# Patient Record
Sex: MALE | Race: WHITE | Employment: OTHER | URBAN - METROPOLITAN AREA
[De-identification: names, ages, dates, MRNs, and addresses within clinical notes are randomized per-mention and may not be internally consistent; named-entity substitution may affect disease eponyms.]

---

## 2017-01-11 ENCOUNTER — TELEPHONE (OUTPATIENT)
Dept: RHEUMATOLOGY | Age: 62
End: 2017-01-11

## 2017-01-24 DIAGNOSIS — M1A.9XX0 CHRONIC GOUT WITHOUT TOPHUS, UNSPECIFIED CAUSE, UNSPECIFIED SITE: Primary | ICD-10-CM

## 2017-01-24 RX ORDER — COLCHICINE 0.6 MG/1
0.6 CAPSULE ORAL
Qty: 15 CAPSULE | Refills: 2 | Status: SHIPPED | OUTPATIENT
Start: 2017-01-24 | End: 2017-06-22 | Stop reason: SDUPTHER

## 2017-01-30 ENCOUNTER — TELEPHONE (OUTPATIENT)
Dept: RHEUMATOLOGY | Age: 62
End: 2017-01-30

## 2017-03-09 ENCOUNTER — OFFICE VISIT (OUTPATIENT)
Dept: ENDOCRINOLOGY | Age: 62
End: 2017-03-09

## 2017-03-09 VITALS
HEIGHT: 70 IN | HEART RATE: 80 BPM | SYSTOLIC BLOOD PRESSURE: 136 MMHG | BODY MASS INDEX: 24.91 KG/M2 | DIASTOLIC BLOOD PRESSURE: 76 MMHG | WEIGHT: 174 LBS | OXYGEN SATURATION: 98 %

## 2017-03-09 DIAGNOSIS — E11.21 TYPE 2 DIABETES MELLITUS WITH DIABETIC NEPHROPATHY, WITHOUT LONG-TERM CURRENT USE OF INSULIN (HCC): Primary | ICD-10-CM

## 2017-03-09 LAB — HBA1C MFR BLD: 5.9 %

## 2017-03-09 PROCEDURE — 99213 OFFICE O/P EST LOW 20 MIN: CPT | Performed by: INTERNAL MEDICINE

## 2017-03-09 ASSESSMENT — PATIENT HEALTH QUESTIONNAIRE - PHQ9
SUM OF ALL RESPONSES TO PHQ QUESTIONS 1-9: 0
1. LITTLE INTEREST OR PLEASURE IN DOING THINGS: 0
SUM OF ALL RESPONSES TO PHQ9 QUESTIONS 1 & 2: 0
2. FEELING DOWN, DEPRESSED OR HOPELESS: 0

## 2017-03-13 ENCOUNTER — TELEPHONE (OUTPATIENT)
Dept: RHEUMATOLOGY | Age: 62
End: 2017-03-13

## 2017-03-13 RX ORDER — GLUCOSAM/CHON-MSM1/C/MANG/BOSW 500-416.6
TABLET ORAL
Qty: 100 EACH | Refills: 2 | Status: SHIPPED | OUTPATIENT
Start: 2017-03-13 | End: 2017-07-13 | Stop reason: SDUPTHER

## 2017-03-13 RX ORDER — CALCIUM CITRATE/VITAMIN D3 200MG-6.25
TABLET ORAL
Qty: 100 EACH | Refills: 3 | Status: SHIPPED | OUTPATIENT
Start: 2017-03-13 | End: 2017-07-13 | Stop reason: SDUPTHER

## 2017-03-15 ENCOUNTER — TELEPHONE (OUTPATIENT)
Dept: RHEUMATOLOGY | Age: 62
End: 2017-03-15

## 2017-03-29 ENCOUNTER — OFFICE VISIT (OUTPATIENT)
Dept: FAMILY MEDICINE CLINIC | Age: 62
End: 2017-03-29

## 2017-03-29 VITALS
SYSTOLIC BLOOD PRESSURE: 136 MMHG | BODY MASS INDEX: 25.65 KG/M2 | HEIGHT: 70 IN | DIASTOLIC BLOOD PRESSURE: 74 MMHG | WEIGHT: 179.2 LBS

## 2017-03-29 DIAGNOSIS — F41.0 PANIC DISORDER: ICD-10-CM

## 2017-03-29 DIAGNOSIS — K21.9 GASTROESOPHAGEAL REFLUX DISEASE WITHOUT ESOPHAGITIS: ICD-10-CM

## 2017-03-29 DIAGNOSIS — F40.00 AGORAPHOBIA: ICD-10-CM

## 2017-03-29 DIAGNOSIS — E11.21 TYPE 2 DIABETES MELLITUS WITH DIABETIC NEPHROPATHY, WITHOUT LONG-TERM CURRENT USE OF INSULIN (HCC): ICD-10-CM

## 2017-03-29 DIAGNOSIS — I10 ESSENTIAL HYPERTENSION: ICD-10-CM

## 2017-03-29 DIAGNOSIS — E78.2 MIXED HYPERLIPIDEMIA: ICD-10-CM

## 2017-03-29 DIAGNOSIS — F41.9 ANXIETY: Primary | ICD-10-CM

## 2017-03-29 PROCEDURE — 99214 OFFICE O/P EST MOD 30 MIN: CPT | Performed by: FAMILY MEDICINE

## 2017-03-29 RX ORDER — FAMOTIDINE 20 MG/1
20 TABLET, FILM COATED ORAL NIGHTLY PRN
Qty: 30 TABLET | Refills: 2 | Status: SHIPPED | OUTPATIENT
Start: 2017-03-29 | End: 2017-06-22 | Stop reason: SDUPTHER

## 2017-03-29 RX ORDER — SIMVASTATIN 20 MG
20 TABLET ORAL EVERY EVENING
Qty: 30 TABLET | Refills: 2 | Status: SHIPPED | OUTPATIENT
Start: 2017-03-29 | End: 2017-06-22 | Stop reason: SDUPTHER

## 2017-03-29 RX ORDER — ALPRAZOLAM 1 MG/1
TABLET ORAL
Qty: 100 TABLET | Refills: 2 | Status: SHIPPED | OUTPATIENT
Start: 2017-03-29 | End: 2017-06-22 | Stop reason: SDUPTHER

## 2017-03-30 ASSESSMENT — ENCOUNTER SYMPTOMS
WHEEZING: 0
BLOOD IN STOOL: 0
DIARRHEA: 0
CONSTIPATION: 0
TROUBLE SWALLOWING: 0
CHEST TIGHTNESS: 0
ABDOMINAL PAIN: 0
SORE THROAT: 0
SHORTNESS OF BREATH: 0

## 2017-04-05 ENCOUNTER — HOSPITAL ENCOUNTER (OUTPATIENT)
Dept: OTHER | Age: 62
Discharge: HOME | End: 2017-04-05
Attending: FAMILY MEDICINE | Admitting: FAMILY MEDICINE

## 2017-04-05 ENCOUNTER — OFFICE VISIT (OUTPATIENT)
Dept: RHEUMATOLOGY | Age: 62
End: 2017-04-05

## 2017-04-05 VITALS
TEMPERATURE: 98.7 F | BODY MASS INDEX: 25.34 KG/M2 | HEIGHT: 70 IN | WEIGHT: 177 LBS | DIASTOLIC BLOOD PRESSURE: 76 MMHG | SYSTOLIC BLOOD PRESSURE: 134 MMHG

## 2017-04-05 DIAGNOSIS — M25.551 HIP PAIN, BILATERAL: ICD-10-CM

## 2017-04-05 DIAGNOSIS — M25.552 HIP PAIN, BILATERAL: ICD-10-CM

## 2017-04-05 DIAGNOSIS — M1A.9XX0 CHRONIC GOUT WITHOUT TOPHUS, UNSPECIFIED CAUSE, UNSPECIFIED SITE: Primary | ICD-10-CM

## 2017-04-05 DIAGNOSIS — N18.3 CKD (CHRONIC KIDNEY DISEASE), STAGE 3 (MODERATE): ICD-10-CM

## 2017-04-05 PROCEDURE — 99215 OFFICE O/P EST HI 40 MIN: CPT | Performed by: INTERNAL MEDICINE

## 2017-04-05 RX ORDER — FEBUXOSTAT 40 MG/1
TABLET, FILM COATED ORAL
Qty: 30 TABLET | Refills: 5 | Status: SHIPPED | OUTPATIENT
Start: 2017-04-05 | End: 2017-10-05 | Stop reason: SDUPTHER

## 2017-04-05 RX ORDER — COLCHICINE 0.6 MG/1
CAPSULE ORAL
Qty: 15 CAPSULE | Refills: 4 | Status: SHIPPED | OUTPATIENT
Start: 2017-04-05 | End: 2017-12-14 | Stop reason: SDUPTHER

## 2017-04-07 ENCOUNTER — TELEPHONE (OUTPATIENT)
Dept: RHEUMATOLOGY | Age: 62
End: 2017-04-07

## 2017-06-22 ENCOUNTER — OFFICE VISIT (OUTPATIENT)
Dept: FAMILY MEDICINE CLINIC | Age: 62
End: 2017-06-22

## 2017-06-22 VITALS
BODY MASS INDEX: 26.23 KG/M2 | SYSTOLIC BLOOD PRESSURE: 134 MMHG | HEIGHT: 70 IN | WEIGHT: 183.2 LBS | DIASTOLIC BLOOD PRESSURE: 80 MMHG

## 2017-06-22 DIAGNOSIS — F41.9 ANXIETY: ICD-10-CM

## 2017-06-22 DIAGNOSIS — F41.0 PANIC DISORDER: ICD-10-CM

## 2017-06-22 DIAGNOSIS — E78.2 MIXED HYPERLIPIDEMIA: ICD-10-CM

## 2017-06-22 DIAGNOSIS — F40.00 AGORAPHOBIA: ICD-10-CM

## 2017-06-22 DIAGNOSIS — K21.9 GASTROESOPHAGEAL REFLUX DISEASE WITHOUT ESOPHAGITIS: ICD-10-CM

## 2017-06-22 DIAGNOSIS — I10 ESSENTIAL HYPERTENSION: ICD-10-CM

## 2017-06-22 DIAGNOSIS — E11.21 TYPE 2 DIABETES MELLITUS WITH DIABETIC NEPHROPATHY, WITHOUT LONG-TERM CURRENT USE OF INSULIN (HCC): Primary | ICD-10-CM

## 2017-06-22 PROCEDURE — 99214 OFFICE O/P EST MOD 30 MIN: CPT | Performed by: FAMILY MEDICINE

## 2017-06-22 RX ORDER — FAMOTIDINE 20 MG/1
20 TABLET, FILM COATED ORAL NIGHTLY PRN
Qty: 30 TABLET | Refills: 2 | Status: SHIPPED | OUTPATIENT
Start: 2017-06-22 | End: 2017-09-21 | Stop reason: SDUPTHER

## 2017-06-22 RX ORDER — ALPRAZOLAM 1 MG/1
TABLET ORAL
Qty: 100 TABLET | Refills: 2 | Status: SHIPPED | OUTPATIENT
Start: 2017-06-22 | End: 2017-09-21 | Stop reason: SDUPTHER

## 2017-06-22 RX ORDER — SIMVASTATIN 20 MG
20 TABLET ORAL EVERY EVENING
Qty: 30 TABLET | Refills: 2 | Status: SHIPPED | OUTPATIENT
Start: 2017-06-22 | End: 2017-09-21 | Stop reason: SDUPTHER

## 2017-06-23 ASSESSMENT — ENCOUNTER SYMPTOMS
BACK PAIN: 1
COUGH: 0
ABDOMINAL PAIN: 0
SHORTNESS OF BREATH: 0

## 2017-07-13 ENCOUNTER — OFFICE VISIT (OUTPATIENT)
Dept: ENDOCRINOLOGY | Age: 62
End: 2017-07-13

## 2017-07-13 VITALS
HEART RATE: 78 BPM | DIASTOLIC BLOOD PRESSURE: 78 MMHG | WEIGHT: 185 LBS | RESPIRATION RATE: 16 BRPM | BODY MASS INDEX: 25.9 KG/M2 | SYSTOLIC BLOOD PRESSURE: 138 MMHG | HEIGHT: 71 IN

## 2017-07-13 DIAGNOSIS — E11.8 CONTROLLED TYPE 2 DIABETES MELLITUS WITH COMPLICATION, WITHOUT LONG-TERM CURRENT USE OF INSULIN (HCC): Primary | ICD-10-CM

## 2017-07-13 LAB — HBA1C MFR BLD: 6.5 %

## 2017-07-13 PROCEDURE — 83036 HEMOGLOBIN GLYCOSYLATED A1C: CPT | Performed by: INTERNAL MEDICINE

## 2017-07-13 PROCEDURE — 99213 OFFICE O/P EST LOW 20 MIN: CPT | Performed by: INTERNAL MEDICINE

## 2017-07-13 RX ORDER — GLUCOSAM/CHON-MSM1/C/MANG/BOSW 500-416.6
TABLET ORAL
Qty: 150 EACH | Refills: 3 | Status: SHIPPED | OUTPATIENT
Start: 2017-07-13 | End: 2017-12-14 | Stop reason: SDUPTHER

## 2017-07-13 RX ORDER — OXYCODONE HYDROCHLORIDE 5 MG/1
5 TABLET ORAL EVERY 4 HOURS PRN
COMMUNITY
End: 2021-07-09

## 2017-07-13 RX ORDER — CALCIUM CITRATE/VITAMIN D3 200MG-6.25
TABLET ORAL
Qty: 150 EACH | Refills: 3 | Status: SHIPPED | OUTPATIENT
Start: 2017-07-13 | End: 2017-12-14 | Stop reason: SDUPTHER

## 2017-08-10 ENCOUNTER — OFFICE VISIT (OUTPATIENT)
Dept: RHEUMATOLOGY | Age: 62
End: 2017-08-10

## 2017-08-10 VITALS
WEIGHT: 186 LBS | BODY MASS INDEX: 26.63 KG/M2 | DIASTOLIC BLOOD PRESSURE: 70 MMHG | HEIGHT: 70 IN | TEMPERATURE: 98.2 F | SYSTOLIC BLOOD PRESSURE: 110 MMHG

## 2017-08-10 DIAGNOSIS — M1A.9XX0 CHRONIC GOUT WITHOUT TOPHUS, UNSPECIFIED CAUSE, UNSPECIFIED SITE: Primary | ICD-10-CM

## 2017-08-10 DIAGNOSIS — M1A.9XX0 CHRONIC GOUT WITHOUT TOPHUS, UNSPECIFIED CAUSE, UNSPECIFIED SITE: ICD-10-CM

## 2017-08-10 DIAGNOSIS — M16.0 PRIMARY OSTEOARTHRITIS OF BOTH HIPS: ICD-10-CM

## 2017-08-10 LAB
A/G RATIO: 1.7 (ref 1.1–2.2)
ALBUMIN SERPL-MCNC: 4.4 G/DL (ref 3.4–5)
ALP BLD-CCNC: 58 U/L (ref 40–129)
ALT SERPL-CCNC: 27 U/L (ref 10–40)
ANION GAP SERPL CALCULATED.3IONS-SCNC: 12 MMOL/L (ref 3–16)
AST SERPL-CCNC: 25 U/L (ref 15–37)
BILIRUB SERPL-MCNC: 1 MG/DL (ref 0–1)
BILIRUBIN DIRECT: <0.2 MG/DL (ref 0–0.3)
BILIRUBIN, INDIRECT: NORMAL MG/DL (ref 0–1)
BUN BLDV-MCNC: 26 MG/DL (ref 7–20)
CALCIUM SERPL-MCNC: 9.3 MG/DL (ref 8.3–10.6)
CHLORIDE BLD-SCNC: 102 MMOL/L (ref 99–110)
CO2: 26 MMOL/L (ref 21–32)
CREAT SERPL-MCNC: 1.3 MG/DL (ref 0.8–1.3)
GFR AFRICAN AMERICAN: >60
GFR NON-AFRICAN AMERICAN: 56
GLOBULIN: 2.6 G/DL
GLUCOSE BLD-MCNC: 107 MG/DL (ref 70–99)
POTASSIUM SERPL-SCNC: 4.6 MMOL/L (ref 3.5–5.1)
SODIUM BLD-SCNC: 140 MMOL/L (ref 136–145)
TOTAL PROTEIN: 7 G/DL (ref 6.4–8.2)
URIC ACID, SERUM: 3.3 MG/DL (ref 3.5–7.2)

## 2017-08-10 PROCEDURE — 99214 OFFICE O/P EST MOD 30 MIN: CPT | Performed by: INTERNAL MEDICINE

## 2017-08-10 RX ORDER — FEBUXOSTAT 40 MG/1
40 TABLET, FILM COATED ORAL DAILY
Qty: 90 TABLET | Refills: 1 | Status: SHIPPED | OUTPATIENT
Start: 2017-08-10 | End: 2017-12-14 | Stop reason: SDUPTHER

## 2017-08-10 RX ORDER — COLCHICINE 0.6 MG/1
TABLET ORAL
Qty: 45 TABLET | Refills: 2 | Status: SHIPPED | OUTPATIENT
Start: 2017-08-10 | End: 2018-03-14

## 2017-09-21 ENCOUNTER — OFFICE VISIT (OUTPATIENT)
Dept: FAMILY MEDICINE CLINIC | Age: 62
End: 2017-09-21

## 2017-09-21 VITALS
SYSTOLIC BLOOD PRESSURE: 132 MMHG | BODY MASS INDEX: 25.84 KG/M2 | DIASTOLIC BLOOD PRESSURE: 72 MMHG | HEIGHT: 71 IN | WEIGHT: 184.6 LBS

## 2017-09-21 DIAGNOSIS — I10 ESSENTIAL HYPERTENSION: ICD-10-CM

## 2017-09-21 DIAGNOSIS — F40.00 AGORAPHOBIA: ICD-10-CM

## 2017-09-21 DIAGNOSIS — F41.0 PANIC DISORDER: ICD-10-CM

## 2017-09-21 DIAGNOSIS — E11.21 TYPE 2 DIABETES MELLITUS WITH DIABETIC NEPHROPATHY, WITHOUT LONG-TERM CURRENT USE OF INSULIN (HCC): Primary | ICD-10-CM

## 2017-09-21 DIAGNOSIS — K21.9 GASTROESOPHAGEAL REFLUX DISEASE WITHOUT ESOPHAGITIS: ICD-10-CM

## 2017-09-21 DIAGNOSIS — F41.9 ANXIETY: ICD-10-CM

## 2017-09-21 DIAGNOSIS — E78.2 MIXED HYPERLIPIDEMIA: ICD-10-CM

## 2017-09-21 DIAGNOSIS — Z20.5 EXPOSURE TO HEPATITIS C: ICD-10-CM

## 2017-09-21 PROCEDURE — 99214 OFFICE O/P EST MOD 30 MIN: CPT | Performed by: FAMILY MEDICINE

## 2017-09-21 RX ORDER — ALPRAZOLAM 1 MG/1
TABLET ORAL
Qty: 100 TABLET | Refills: 2 | Status: SHIPPED | OUTPATIENT
Start: 2017-09-21 | End: 2017-12-14 | Stop reason: SDUPTHER

## 2017-09-21 RX ORDER — SIMVASTATIN 20 MG
20 TABLET ORAL EVERY EVENING
Qty: 30 TABLET | Refills: 2 | Status: SHIPPED | OUTPATIENT
Start: 2017-09-21 | End: 2017-12-14 | Stop reason: SDUPTHER

## 2017-09-21 RX ORDER — FAMOTIDINE 20 MG/1
20 TABLET, FILM COATED ORAL NIGHTLY PRN
Qty: 30 TABLET | Refills: 2 | Status: SHIPPED | OUTPATIENT
Start: 2017-09-21 | End: 2017-12-14 | Stop reason: SDUPTHER

## 2017-09-22 DIAGNOSIS — I10 ESSENTIAL HYPERTENSION: ICD-10-CM

## 2017-09-22 DIAGNOSIS — E11.21 TYPE 2 DIABETES MELLITUS WITH DIABETIC NEPHROPATHY, UNSPECIFIED LONG TERM INSULIN USE STATUS: ICD-10-CM

## 2017-09-22 DIAGNOSIS — Z00.00 PREVENTATIVE HEALTH CARE: ICD-10-CM

## 2017-09-22 DIAGNOSIS — E78.2 MIXED HYPERLIPIDEMIA: ICD-10-CM

## 2017-09-22 DIAGNOSIS — N18.30 CKD (CHRONIC KIDNEY DISEASE) STAGE 3, GFR 30-59 ML/MIN (HCC): ICD-10-CM

## 2017-09-22 DIAGNOSIS — E11.21 TYPE 2 DIABETES MELLITUS WITH DIABETIC NEPHROPATHY, UNSPECIFIED LONG TERM INSULIN USE STATUS: Primary | ICD-10-CM

## 2017-09-22 LAB
ANION GAP SERPL CALCULATED.3IONS-SCNC: 14 MMOL/L (ref 3–16)
BUN BLDV-MCNC: 23 MG/DL (ref 7–20)
CALCIUM SERPL-MCNC: 9.4 MG/DL (ref 8.3–10.6)
CHLORIDE BLD-SCNC: 106 MMOL/L (ref 99–110)
CHOLESTEROL, TOTAL: 134 MG/DL (ref 0–199)
CO2: 25 MMOL/L (ref 21–32)
CREAT SERPL-MCNC: 1.2 MG/DL (ref 0.8–1.3)
CREATININE URINE: 138.2 MG/DL (ref 39–259)
GFR AFRICAN AMERICAN: >60
GFR NON-AFRICAN AMERICAN: >60
GLUCOSE BLD-MCNC: 125 MG/DL (ref 70–99)
HDLC SERPL-MCNC: 48 MG/DL (ref 40–60)
HEPATITIS C ANTIBODY INTERPRETATION: NORMAL
LDL CHOLESTEROL CALCULATED: 72 MG/DL
MICROALBUMIN UR-MCNC: <1.2 MG/DL
MICROALBUMIN/CREAT UR-RTO: NORMAL MG/G (ref 0–30)
POTASSIUM SERPL-SCNC: 4.4 MMOL/L (ref 3.5–5.1)
SODIUM BLD-SCNC: 145 MMOL/L (ref 136–145)
TRIGL SERPL-MCNC: 68 MG/DL (ref 0–150)
TSH SERPL DL<=0.05 MIU/L-ACNC: 1.68 UIU/ML (ref 0.27–4.2)
VLDLC SERPL CALC-MCNC: 14 MG/DL

## 2017-09-22 ASSESSMENT — ENCOUNTER SYMPTOMS
COUGH: 0
EYE PAIN: 0
SHORTNESS OF BREATH: 0
ABDOMINAL PAIN: 0
WHEEZING: 0

## 2017-09-25 LAB — HIV-1 AND HIV-2 ANTIBODIES: NORMAL

## 2017-10-05 ENCOUNTER — TELEPHONE (OUTPATIENT)
Dept: FAMILY MEDICINE CLINIC | Age: 62
End: 2017-10-05

## 2017-10-05 NOTE — TELEPHONE ENCOUNTER
Kidney function normal   ok for viagra   ok for dentral extractions   hold asa few day prior if takes any asa

## 2017-12-14 ENCOUNTER — OFFICE VISIT (OUTPATIENT)
Dept: FAMILY MEDICINE CLINIC | Age: 62
End: 2017-12-14

## 2017-12-14 VITALS
WEIGHT: 183.4 LBS | DIASTOLIC BLOOD PRESSURE: 76 MMHG | SYSTOLIC BLOOD PRESSURE: 132 MMHG | BODY MASS INDEX: 26.26 KG/M2 | HEIGHT: 70 IN

## 2017-12-14 DIAGNOSIS — F41.9 ANXIETY: Primary | ICD-10-CM

## 2017-12-14 DIAGNOSIS — E78.2 MIXED HYPERLIPIDEMIA: ICD-10-CM

## 2017-12-14 DIAGNOSIS — E11.21 TYPE 2 DIABETES MELLITUS WITH DIABETIC NEPHROPATHY, UNSPECIFIED LONG TERM INSULIN USE STATUS: ICD-10-CM

## 2017-12-14 DIAGNOSIS — F41.0 PANIC DISORDER: ICD-10-CM

## 2017-12-14 DIAGNOSIS — F40.00 AGORAPHOBIA: ICD-10-CM

## 2017-12-14 DIAGNOSIS — K21.9 GASTROESOPHAGEAL REFLUX DISEASE WITHOUT ESOPHAGITIS: ICD-10-CM

## 2017-12-14 PROCEDURE — 1036F TOBACCO NON-USER: CPT | Performed by: FAMILY MEDICINE

## 2017-12-14 PROCEDURE — 3044F HG A1C LEVEL LT 7.0%: CPT | Performed by: FAMILY MEDICINE

## 2017-12-14 PROCEDURE — G8484 FLU IMMUNIZE NO ADMIN: HCPCS | Performed by: FAMILY MEDICINE

## 2017-12-14 PROCEDURE — G8427 DOCREV CUR MEDS BY ELIG CLIN: HCPCS | Performed by: FAMILY MEDICINE

## 2017-12-14 PROCEDURE — 99213 OFFICE O/P EST LOW 20 MIN: CPT | Performed by: FAMILY MEDICINE

## 2017-12-14 PROCEDURE — 3017F COLORECTAL CA SCREEN DOC REV: CPT | Performed by: FAMILY MEDICINE

## 2017-12-14 PROCEDURE — G8417 CALC BMI ABV UP PARAM F/U: HCPCS | Performed by: FAMILY MEDICINE

## 2017-12-14 RX ORDER — SIMVASTATIN 20 MG
20 TABLET ORAL EVERY EVENING
Qty: 30 TABLET | Refills: 2 | Status: SHIPPED | OUTPATIENT
Start: 2017-12-14 | End: 2018-03-14 | Stop reason: SDUPTHER

## 2017-12-14 RX ORDER — ALPRAZOLAM 1 MG/1
TABLET ORAL
Qty: 100 TABLET | Refills: 2 | Status: SHIPPED | OUTPATIENT
Start: 2017-12-14 | End: 2018-03-14 | Stop reason: SDUPTHER

## 2017-12-14 RX ORDER — FAMOTIDINE 20 MG/1
20 TABLET, FILM COATED ORAL NIGHTLY PRN
Qty: 30 TABLET | Refills: 2 | Status: SHIPPED | OUTPATIENT
Start: 2017-12-14 | End: 2018-03-14 | Stop reason: SDUPTHER

## 2017-12-14 NOTE — PROGRESS NOTES
pertinent positives are noted in HPI    Vitals:    12/14/17 1433   BP: (!) 150/80   Weight: 183 lb 6.4 oz (83.2 kg)   Height: 5' 10\" (1.778 m)     Body mass index is 26.32 kg/m². Wt Readings from Last 3 Encounters:   12/14/17 183 lb 6.4 oz (83.2 kg)   09/21/17 184 lb 9.6 oz (83.7 kg)   08/10/17 186 lb (84.4 kg)     BP Readings from Last 3 Encounters:   12/14/17 (!) 150/80   09/21/17 132/72   08/10/17 110/70        BP (!) 150/80   Ht 5' 10\" (1.778 m)   Wt 183 lb 6.4 oz (83.2 kg)   BMI 26.32 kg/m²     /76   Ht 5' 10\" (1.778 m)   Wt 183 lb 6.4 oz (83.2 kg)   BMI 26.32 kg/m²    Objective:   Physical Exam   Constitutional: He is oriented to person, place, and time. He appears well-developed. No distress. HENT:   Right Ear: External ear normal.   Left Ear: External ear normal.   Nose: Nose normal.   Mouth/Throat: Oropharynx is clear and moist.   Eyes: Conjunctivae are normal. No scleral icterus. Neck: Neck supple. No thyromegaly present. Cardiovascular: Normal rate, regular rhythm, normal heart sounds and intact distal pulses. No murmur heard. Pulmonary/Chest: Effort normal and breath sounds normal. No respiratory distress. Abdominal: Soft. Bowel sounds are normal. There is no tenderness. Musculoskeletal: He exhibits no edema. Lymphadenopathy:     He has no cervical adenopathy. Neurological: He is alert and oriented to person, place, and time. Skin: Skin is warm. No rash noted. Psychiatric: He has a normal mood and affect. Thought content normal.   anxious       Assessment:        Assessment/Plan:  Kraig Connolly was seen today for 3 month follow-up. Diagnoses and all orders for this visit:    Anxiety, controlled with curerent med  -     ALPRAZolam (XANAX) 1 MG tablet; 1 tab three times a day and as  Needed. Panic disorder controlled with meds  -     ALPRAZolam (XANAX) 1 MG tablet; 1 tab three times a day and as  Needed.     Gastroesophageal reflux disease without esophagitis  -

## 2017-12-15 RX ORDER — GLUCOSAM/CHON-MSM1/C/MANG/BOSW 500-416.6
TABLET ORAL
Qty: 100 EACH | Refills: 3 | Status: SHIPPED | OUTPATIENT
Start: 2017-12-15 | End: 2018-02-15 | Stop reason: SDUPTHER

## 2017-12-15 RX ORDER — CALCIUM CITRATE/VITAMIN D3 200MG-6.25
TABLET ORAL
Qty: 100 STRIP | Refills: 3 | Status: SHIPPED | OUTPATIENT
Start: 2017-12-15 | End: 2018-01-25 | Stop reason: SDUPTHER

## 2018-01-11 ENCOUNTER — TELEPHONE (OUTPATIENT)
Dept: ENDOCRINOLOGY | Age: 63
End: 2018-01-11

## 2018-01-25 RX ORDER — CALCIUM CITRATE/VITAMIN D3 200MG-6.25
TABLET ORAL
Qty: 100 STRIP | Refills: 3 | Status: SHIPPED | OUTPATIENT
Start: 2018-01-25 | End: 2018-02-01 | Stop reason: SDUPTHER

## 2018-01-27 ENCOUNTER — TELEPHONE (OUTPATIENT)
Dept: FAMILY MEDICINE CLINIC | Age: 63
End: 2018-01-27

## 2018-02-01 ENCOUNTER — TELEPHONE (OUTPATIENT)
Dept: FAMILY MEDICINE CLINIC | Age: 63
End: 2018-02-01

## 2018-02-01 RX ORDER — CALCIUM CITRATE/VITAMIN D3 200MG-6.25
TABLET ORAL
Qty: 100 STRIP | Refills: 3 | Status: SHIPPED | OUTPATIENT
Start: 2018-02-01 | End: 2018-02-05 | Stop reason: SDUPTHER

## 2018-02-01 NOTE — TELEPHONE ENCOUNTER
TRUE METRIX BLOOD GLUCOSE TEST strip     Pt would like Dr. Christina Renteria to push the insurance company to approve him using 3 - 4 strips per day. He feels that if Dr. Christina Renteria pushes they will approve more than one strip per day. Please look into this for patient.

## 2018-02-05 ENCOUNTER — TELEPHONE (OUTPATIENT)
Dept: ENDOCRINOLOGY | Age: 63
End: 2018-02-05

## 2018-02-05 ENCOUNTER — TELEPHONE (OUTPATIENT)
Dept: FAMILY MEDICINE CLINIC | Age: 63
End: 2018-02-05

## 2018-02-05 RX ORDER — CALCIUM CITRATE/VITAMIN D3 200MG-6.25
TABLET ORAL
Qty: 30 STRIP | Refills: 3 | Status: SHIPPED | OUTPATIENT
Start: 2018-02-05 | End: 2018-02-15 | Stop reason: SDUPTHER

## 2018-02-06 ENCOUNTER — TELEPHONE (OUTPATIENT)
Dept: ENDOCRINOLOGY | Age: 63
End: 2018-02-06

## 2018-02-08 ENCOUNTER — OFFICE VISIT (OUTPATIENT)
Dept: RHEUMATOLOGY | Age: 63
End: 2018-02-08

## 2018-02-08 VITALS
DIASTOLIC BLOOD PRESSURE: 70 MMHG | TEMPERATURE: 98 F | HEIGHT: 70 IN | SYSTOLIC BLOOD PRESSURE: 122 MMHG | WEIGHT: 185 LBS | BODY MASS INDEX: 26.48 KG/M2

## 2018-02-08 DIAGNOSIS — M1A.9XX0 CHRONIC GOUT WITHOUT TOPHUS, UNSPECIFIED CAUSE, UNSPECIFIED SITE: Primary | ICD-10-CM

## 2018-02-08 DIAGNOSIS — M1A.9XX0 CHRONIC GOUT WITHOUT TOPHUS, UNSPECIFIED CAUSE, UNSPECIFIED SITE: ICD-10-CM

## 2018-02-08 DIAGNOSIS — M16.0 PRIMARY OSTEOARTHRITIS OF BOTH HIPS: ICD-10-CM

## 2018-02-08 LAB
A/G RATIO: 2 (ref 1.1–2.2)
ALBUMIN SERPL-MCNC: 4.8 G/DL (ref 3.4–5)
ALP BLD-CCNC: 59 U/L (ref 40–129)
ALT SERPL-CCNC: 22 U/L (ref 10–40)
ANION GAP SERPL CALCULATED.3IONS-SCNC: 14 MMOL/L (ref 3–16)
AST SERPL-CCNC: 26 U/L (ref 15–37)
BILIRUB SERPL-MCNC: 1.1 MG/DL (ref 0–1)
BUN BLDV-MCNC: 33 MG/DL (ref 7–20)
CALCIUM SERPL-MCNC: 9.7 MG/DL (ref 8.3–10.6)
CHLORIDE BLD-SCNC: 103 MMOL/L (ref 99–110)
CO2: 26 MMOL/L (ref 21–32)
CREAT SERPL-MCNC: 1.3 MG/DL (ref 0.8–1.3)
GFR AFRICAN AMERICAN: >60
GFR NON-AFRICAN AMERICAN: 55
GLOBULIN: 2.4 G/DL
GLUCOSE BLD-MCNC: 88 MG/DL (ref 70–99)
POTASSIUM SERPL-SCNC: 4.7 MMOL/L (ref 3.5–5.1)
SODIUM BLD-SCNC: 143 MMOL/L (ref 136–145)
TOTAL PROTEIN: 7.2 G/DL (ref 6.4–8.2)
URIC ACID, SERUM: 3.8 MG/DL (ref 3.5–7.2)

## 2018-02-08 PROCEDURE — 99213 OFFICE O/P EST LOW 20 MIN: CPT | Performed by: INTERNAL MEDICINE

## 2018-02-08 PROCEDURE — G8484 FLU IMMUNIZE NO ADMIN: HCPCS | Performed by: INTERNAL MEDICINE

## 2018-02-08 PROCEDURE — G8427 DOCREV CUR MEDS BY ELIG CLIN: HCPCS | Performed by: INTERNAL MEDICINE

## 2018-02-08 PROCEDURE — G8417 CALC BMI ABV UP PARAM F/U: HCPCS | Performed by: INTERNAL MEDICINE

## 2018-02-08 PROCEDURE — 1036F TOBACCO NON-USER: CPT | Performed by: INTERNAL MEDICINE

## 2018-02-08 PROCEDURE — 3017F COLORECTAL CA SCREEN DOC REV: CPT | Performed by: INTERNAL MEDICINE

## 2018-02-08 RX ORDER — FEBUXOSTAT 40 MG/1
40 TABLET, FILM COATED ORAL DAILY
Qty: 90 TABLET | Refills: 1 | Status: SHIPPED | OUTPATIENT
Start: 2018-02-08 | End: 2019-09-19 | Stop reason: SDUPTHER

## 2018-02-08 RX ORDER — COLCHICINE 0.6 MG/1
TABLET ORAL
Qty: 12 TABLET | Refills: 0 | Status: SHIPPED | OUTPATIENT
Start: 2018-02-08 | End: 2018-08-16 | Stop reason: SDUPTHER

## 2018-02-09 NOTE — PROGRESS NOTES
[address] Luciano MD                                                 [address] [facility], [address] [place] [address]                                                [phone] (F)      Primary provider: Hakan Umana DO  Patient identification: Juana Lovell Sr. ,: 1955,Sex: male         ASSESSMENT/PLAN:  Adenike Reagan was seen today for follow-up. Diagnoses and all orders for this visit:    Chronic gout without tophus, unspecified cause, unspecified site  -     Uric Acid; Future  -     Comprehensive Metabolic Panel; Future    Primary osteoarthritis of both hips      Other medical problems- DM, CKD. Lab Results   Component Value Date    LABURIC 3.3 (L) 08/10/2017     Target serum uric acid is achieved approximately 1.5 years ago. Continue Uloric 40 mg daily. He is taking Colchicine every other day. He does not want stop it, worries about flares despite of explaining Rx rationale. To convince him, I told him to take it every 4 th day x 1 month and stop. There is no medical reasoning  on doing this but he seems to be okay with plan. Hip OA- limited hip ROM Bilaterally especially abduction. He is able to manage ADLs and recreational activities without discomfort. Follow-up 6 months    Patient indicates understanding and agrees with the management plan. Note is transcribed using voice recognition software. Inadvertent computerized transcription errors may be present. ##################################################################    Subjective: Follow up for gout and hip OA. No gout flare since last visit. States that he cannot move his hips in certain ways, however he has learned to live with it without much discomfort or pain. Normal ADLs and recreational activities. He claims that he still able to run.   He is very concerned about changing Colchicine  doses,

## 2018-02-15 ENCOUNTER — OFFICE VISIT (OUTPATIENT)
Dept: ENDOCRINOLOGY | Age: 63
End: 2018-02-15

## 2018-02-15 VITALS
WEIGHT: 184.4 LBS | HEART RATE: 84 BPM | DIASTOLIC BLOOD PRESSURE: 74 MMHG | SYSTOLIC BLOOD PRESSURE: 132 MMHG | OXYGEN SATURATION: 98 % | BODY MASS INDEX: 26.4 KG/M2 | HEIGHT: 70 IN

## 2018-02-15 DIAGNOSIS — E11.9 DIABETES MELLITUS WITH NO COMPLICATION (HCC): Primary | ICD-10-CM

## 2018-02-15 LAB — HBA1C MFR BLD: 6.6 %

## 2018-02-15 PROCEDURE — 3046F HEMOGLOBIN A1C LEVEL >9.0%: CPT | Performed by: INTERNAL MEDICINE

## 2018-02-15 PROCEDURE — 3017F COLORECTAL CA SCREEN DOC REV: CPT | Performed by: INTERNAL MEDICINE

## 2018-02-15 PROCEDURE — 99213 OFFICE O/P EST LOW 20 MIN: CPT | Performed by: INTERNAL MEDICINE

## 2018-02-15 PROCEDURE — G8484 FLU IMMUNIZE NO ADMIN: HCPCS | Performed by: INTERNAL MEDICINE

## 2018-02-15 PROCEDURE — 1036F TOBACCO NON-USER: CPT | Performed by: INTERNAL MEDICINE

## 2018-02-15 PROCEDURE — G8427 DOCREV CUR MEDS BY ELIG CLIN: HCPCS | Performed by: INTERNAL MEDICINE

## 2018-02-15 PROCEDURE — G8417 CALC BMI ABV UP PARAM F/U: HCPCS | Performed by: INTERNAL MEDICINE

## 2018-02-15 PROCEDURE — 83036 HEMOGLOBIN GLYCOSYLATED A1C: CPT | Performed by: INTERNAL MEDICINE

## 2018-02-15 RX ORDER — GLUCOSAM/CHON-MSM1/C/MANG/BOSW 500-416.6
TABLET ORAL
Qty: 50 EACH | Refills: 3 | Status: SHIPPED | OUTPATIENT
Start: 2018-02-15 | End: 2018-02-16 | Stop reason: SDUPTHER

## 2018-02-15 RX ORDER — CALCIUM CITRATE/VITAMIN D3 200MG-6.25
TABLET ORAL
Qty: 50 STRIP | Refills: 3 | Status: SHIPPED | OUTPATIENT
Start: 2018-02-15 | End: 2018-02-16 | Stop reason: SDUPTHER

## 2018-02-15 ASSESSMENT — PATIENT HEALTH QUESTIONNAIRE - PHQ9
SUM OF ALL RESPONSES TO PHQ9 QUESTIONS 1 & 2: 0
1. LITTLE INTEREST OR PLEASURE IN DOING THINGS: 0
SUM OF ALL RESPONSES TO PHQ QUESTIONS 1-9: 0
2. FEELING DOWN, DEPRESSED OR HOPELESS: 0

## 2018-02-16 ENCOUNTER — TELEPHONE (OUTPATIENT)
Dept: FAMILY MEDICINE CLINIC | Age: 63
End: 2018-02-16

## 2018-02-16 RX ORDER — CALCIUM CITRATE/VITAMIN D3 200MG-6.25
TABLET ORAL
Qty: 100 STRIP | Refills: 3 | Status: SHIPPED | OUTPATIENT
Start: 2018-02-16 | End: 2018-08-01 | Stop reason: SDUPTHER

## 2018-02-16 RX ORDER — GLUCOSAM/CHON-MSM1/C/MANG/BOSW 500-416.6
TABLET ORAL
Qty: 100 EACH | Refills: 3 | Status: SHIPPED | OUTPATIENT
Start: 2018-02-16 | End: 2018-08-01 | Stop reason: SDUPTHER

## 2018-02-16 NOTE — TELEPHONE ENCOUNTER
TRUEPLUS LANCETS 33G MISC     Sig: USE 1-2 times PER DAY    Pt states he tests 4x a day. Can you please look into this and call pharmacy back.

## 2018-03-12 ENCOUNTER — TELEPHONE (OUTPATIENT)
Dept: ENDOCRINOLOGY | Age: 63
End: 2018-03-12

## 2018-03-13 ENCOUNTER — TELEPHONE (OUTPATIENT)
Dept: ENDOCRINOLOGY | Age: 63
End: 2018-03-13

## 2018-03-13 NOTE — TELEPHONE ENCOUNTER
Zulema Rosales called to say pt's True Metrix test Strips needs a P. A. Please call pt and tell him this takes longer than one day to take care of as he keeps calling Zulema Rosales and was told by our office that [facility]

## 2018-03-13 NOTE — TELEPHONE ENCOUNTER
Called pharmacy back and let them know I did PA yesterday and it came back denied, sent appeal in today

## 2018-03-14 ENCOUNTER — OFFICE VISIT (OUTPATIENT)
Dept: FAMILY MEDICINE CLINIC | Age: 63
End: 2018-03-14

## 2018-03-14 VITALS
HEIGHT: 70 IN | DIASTOLIC BLOOD PRESSURE: 70 MMHG | SYSTOLIC BLOOD PRESSURE: 136 MMHG | WEIGHT: 187 LBS | BODY MASS INDEX: 26.77 KG/M2

## 2018-03-14 DIAGNOSIS — Z12.11 SCREEN FOR COLON CANCER: ICD-10-CM

## 2018-03-14 DIAGNOSIS — F40.00 AGORAPHOBIA: ICD-10-CM

## 2018-03-14 DIAGNOSIS — F41.9 ANXIETY: Primary | ICD-10-CM

## 2018-03-14 DIAGNOSIS — K21.9 GASTROESOPHAGEAL REFLUX DISEASE WITHOUT ESOPHAGITIS: ICD-10-CM

## 2018-03-14 DIAGNOSIS — F41.0 PANIC DISORDER: ICD-10-CM

## 2018-03-14 DIAGNOSIS — E78.2 MIXED HYPERLIPIDEMIA: ICD-10-CM

## 2018-03-14 PROCEDURE — G8427 DOCREV CUR MEDS BY ELIG CLIN: HCPCS | Performed by: FAMILY MEDICINE

## 2018-03-14 PROCEDURE — 99214 OFFICE O/P EST MOD 30 MIN: CPT | Performed by: FAMILY MEDICINE

## 2018-03-14 PROCEDURE — 3017F COLORECTAL CA SCREEN DOC REV: CPT | Performed by: FAMILY MEDICINE

## 2018-03-14 PROCEDURE — 1036F TOBACCO NON-USER: CPT | Performed by: FAMILY MEDICINE

## 2018-03-14 PROCEDURE — G8484 FLU IMMUNIZE NO ADMIN: HCPCS | Performed by: FAMILY MEDICINE

## 2018-03-14 PROCEDURE — G8417 CALC BMI ABV UP PARAM F/U: HCPCS | Performed by: FAMILY MEDICINE

## 2018-03-14 RX ORDER — SIMVASTATIN 20 MG
20 TABLET ORAL EVERY EVENING
Qty: 30 TABLET | Refills: 2 | Status: SHIPPED | OUTPATIENT
Start: 2018-03-14 | End: 2018-06-14 | Stop reason: SDUPTHER

## 2018-03-14 RX ORDER — FAMOTIDINE 20 MG/1
20 TABLET, FILM COATED ORAL NIGHTLY PRN
Qty: 30 TABLET | Refills: 2 | Status: SHIPPED | OUTPATIENT
Start: 2018-03-14 | End: 2018-03-14 | Stop reason: SDUPTHER

## 2018-03-14 RX ORDER — SIMVASTATIN 20 MG
20 TABLET ORAL EVERY EVENING
Qty: 30 TABLET | Refills: 2 | Status: SHIPPED | OUTPATIENT
Start: 2018-03-14 | End: 2018-03-14 | Stop reason: SDUPTHER

## 2018-03-14 RX ORDER — FAMOTIDINE 20 MG/1
20 TABLET, FILM COATED ORAL NIGHTLY PRN
Qty: 30 TABLET | Refills: 2 | Status: SHIPPED | OUTPATIENT
Start: 2018-03-14 | End: 2018-06-14 | Stop reason: SDUPTHER

## 2018-03-14 RX ORDER — ALPRAZOLAM 1 MG/1
TABLET ORAL
Qty: 100 TABLET | Refills: 2
Start: 2018-03-14 | End: 2018-03-14 | Stop reason: SDUPTHER

## 2018-03-14 RX ORDER — ALPRAZOLAM 1 MG/1
TABLET ORAL
Qty: 100 TABLET | Refills: 2 | Status: SHIPPED | OUTPATIENT
Start: 2018-03-14 | End: 2018-06-14 | Stop reason: SDUPTHER

## 2018-03-14 ASSESSMENT — ENCOUNTER SYMPTOMS
SHORTNESS OF BREATH: 0
WHEEZING: 0
EYE PAIN: 0
COUGH: 0
ABDOMINAL PAIN: 0

## 2018-03-14 NOTE — PROGRESS NOTES
Subjective:      Patient ID: Karen Amaya Sr. is a 57 y.o. male. HPI  Check up,   Blood pressure qand diabetes    See endocrin for dm2  No chest pain or sob   been doing well          Hemoglobin A1C (%)   Date Value   02/15/2018 6.6     Microalbumin, Random Urine (mg/dL)   Date Value   09/22/2017 <1.20     LDL Calculated (mg/dL)   Date Value   09/22/2017 72                Current Outpatient Prescriptions   Medication Sig      febuxostat (ULORIC) 40 MG TABS tablet Take 1 tablet by mouth daily      simvastatin (ZOCOR) 20 MG tablet Take 1 tablet by mouth every evening      colchicine (COLCRYS) 0.6 MG tablet Take 1 tab po every other day      TRUEPLUS LANCETS 33G MISC USE 4 times PER DAY      TRUE METRIX BLOOD GLUCOSE TEST strip USE 4  TIMES A DAY AS NEEDED             famotidine (PEPCID) 20 MG tablet Take 1 tablet by mouth nightly as needed (abd pain)      ALPRAZolam (XANAX) 1 MG tablet 1 tab three times a day and as  Needed.  ULORIC 40 MG TABS tablet TAKE ONE TABLET BY MOUTH DAILY      oxyCODONE (ROXICODONE) 5 MG immediate release tablet Take 5 mg by mouth every 4 hours as needed for Pain .  Blood Glucose Monitoring Suppl (TRUE METRIX METER) W/DEVICE KIT 1 each by Does not apply route 4 times daily      ONE TOUCH LANCETS MISC 1 each by Does not apply route daily      aspirin 325 MG EC tablet Take 325 mg by mouth daily       No current facility-administered medications for this visit. Review of Systems   Constitutional: Negative for appetite change, fatigue and unexpected weight change. Eyes: Negative for pain and visual disturbance. Respiratory: Negative for cough, shortness of breath and wheezing. Cardiovascular: Negative for chest pain, palpitations and leg swelling. Gastrointestinal: Negative for abdominal pain. Endocrine: Negative for polyuria. Genitourinary: Negative for difficulty urinating. Neurological: Negative for speech difficulty, numbness and headaches. hyperlipidemia  -     simvastatin (ZOCOR) 20 MG tablet; Take 1 tablet by mouth every evening    Gastroesophageal reflux disease without esophagitis  -     famotidine (PEPCID) 20 MG tablet;  Take 1 tablet by mouth nightly as needed (abd pain)            Plan:      Cont meds   cont care endocrin and nephrology  rto 3 months  long discussion regarding kidney function and his concerns  Declines colonoscopy a this time    Fit test packet given

## 2018-04-05 ENCOUNTER — TELEPHONE (OUTPATIENT)
Dept: FAMILY MEDICINE CLINIC | Age: 63
End: 2018-04-05

## 2018-04-05 ENCOUNTER — TELEPHONE (OUTPATIENT)
Dept: ENDOCRINOLOGY | Age: 63
End: 2018-04-05

## 2018-04-05 DIAGNOSIS — Z12.11 SCREEN FOR COLON CANCER: ICD-10-CM

## 2018-04-05 LAB
CONTROL: NORMAL
HEMOCCULT STL QL: NEGATIVE

## 2018-04-05 PROCEDURE — 82274 ASSAY TEST FOR BLOOD FECAL: CPT | Performed by: FAMILY MEDICINE

## 2018-04-09 ENCOUNTER — TELEPHONE (OUTPATIENT)
Dept: ENDOCRINOLOGY | Age: 63
End: 2018-04-09

## 2018-04-10 ENCOUNTER — TELEPHONE (OUTPATIENT)
Dept: ENDOCRINOLOGY | Age: 63
End: 2018-04-10

## 2018-04-11 ENCOUNTER — TELEPHONE (OUTPATIENT)
Dept: ENDOCRINOLOGY | Age: 63
End: 2018-04-11

## 2018-06-14 ENCOUNTER — OFFICE VISIT (OUTPATIENT)
Dept: FAMILY MEDICINE CLINIC | Age: 63
End: 2018-06-14

## 2018-06-14 VITALS
SYSTOLIC BLOOD PRESSURE: 134 MMHG | BODY MASS INDEX: 26.63 KG/M2 | DIASTOLIC BLOOD PRESSURE: 80 MMHG | WEIGHT: 186 LBS | HEIGHT: 70 IN

## 2018-06-14 DIAGNOSIS — F41.9 ANXIETY: Primary | ICD-10-CM

## 2018-06-14 DIAGNOSIS — E78.2 MIXED HYPERLIPIDEMIA: ICD-10-CM

## 2018-06-14 DIAGNOSIS — F40.00 AGORAPHOBIA: ICD-10-CM

## 2018-06-14 DIAGNOSIS — T46.6X5A MYALGIA DUE TO STATIN: ICD-10-CM

## 2018-06-14 DIAGNOSIS — M79.10 MYALGIA DUE TO STATIN: ICD-10-CM

## 2018-06-14 DIAGNOSIS — K21.9 GASTROESOPHAGEAL REFLUX DISEASE WITHOUT ESOPHAGITIS: ICD-10-CM

## 2018-06-14 DIAGNOSIS — F41.0 PANIC DISORDER: ICD-10-CM

## 2018-06-14 DIAGNOSIS — E11.21 TYPE 2 DIABETES MELLITUS WITH DIABETIC NEPHROPATHY, UNSPECIFIED LONG TERM INSULIN USE STATUS: ICD-10-CM

## 2018-06-14 PROCEDURE — 4040F PNEUMOC VAC/ADMIN/RCVD: CPT | Performed by: FAMILY MEDICINE

## 2018-06-14 PROCEDURE — 2022F DILAT RTA XM EVC RTNOPTHY: CPT | Performed by: FAMILY MEDICINE

## 2018-06-14 PROCEDURE — 1123F ACP DISCUSS/DSCN MKR DOCD: CPT | Performed by: FAMILY MEDICINE

## 2018-06-14 PROCEDURE — G8417 CALC BMI ABV UP PARAM F/U: HCPCS | Performed by: FAMILY MEDICINE

## 2018-06-14 PROCEDURE — 99214 OFFICE O/P EST MOD 30 MIN: CPT | Performed by: FAMILY MEDICINE

## 2018-06-14 PROCEDURE — G8427 DOCREV CUR MEDS BY ELIG CLIN: HCPCS | Performed by: FAMILY MEDICINE

## 2018-06-14 PROCEDURE — 3017F COLORECTAL CA SCREEN DOC REV: CPT | Performed by: FAMILY MEDICINE

## 2018-06-14 PROCEDURE — 1036F TOBACCO NON-USER: CPT | Performed by: FAMILY MEDICINE

## 2018-06-14 PROCEDURE — 3044F HG A1C LEVEL LT 7.0%: CPT | Performed by: FAMILY MEDICINE

## 2018-06-14 RX ORDER — SIMVASTATIN 20 MG
20 TABLET ORAL EVERY EVENING
Qty: 30 TABLET | Refills: 2 | Status: SHIPPED | OUTPATIENT
Start: 2018-06-14 | End: 2018-06-14 | Stop reason: SDUPTHER

## 2018-06-14 RX ORDER — SIMVASTATIN 20 MG
20 TABLET ORAL EVERY EVENING
Qty: 30 TABLET | Refills: 2 | Status: SHIPPED | OUTPATIENT
Start: 2018-06-14 | End: 2018-09-13 | Stop reason: SINTOL

## 2018-06-14 RX ORDER — FAMOTIDINE 20 MG/1
20 TABLET, FILM COATED ORAL NIGHTLY PRN
Qty: 30 TABLET | Refills: 2 | Status: SHIPPED | OUTPATIENT
Start: 2018-06-14 | End: 2018-09-13 | Stop reason: SDUPTHER

## 2018-06-14 RX ORDER — ALPRAZOLAM 1 MG/1
TABLET ORAL
Qty: 100 TABLET | Refills: 2 | Status: SHIPPED | OUTPATIENT
Start: 2018-06-14 | End: 2018-06-14 | Stop reason: SDUPTHER

## 2018-06-14 RX ORDER — ALPRAZOLAM 1 MG/1
TABLET ORAL
Qty: 100 TABLET | Refills: 2 | Status: SHIPPED | OUTPATIENT
Start: 2018-06-14 | End: 2018-09-13 | Stop reason: SDUPTHER

## 2018-06-14 RX ORDER — FAMOTIDINE 20 MG/1
20 TABLET, FILM COATED ORAL NIGHTLY PRN
Qty: 30 TABLET | Refills: 2 | Status: SHIPPED | OUTPATIENT
Start: 2018-06-14 | End: 2018-06-14 | Stop reason: SDUPTHER

## 2018-06-14 ASSESSMENT — ENCOUNTER SYMPTOMS
COUGH: 0
WHEEZING: 0
EYE PAIN: 0
ABDOMINAL PAIN: 0
SHORTNESS OF BREATH: 0

## 2018-06-14 ASSESSMENT — PATIENT HEALTH QUESTIONNAIRE - PHQ9
1. LITTLE INTEREST OR PLEASURE IN DOING THINGS: 1
2. FEELING DOWN, DEPRESSED OR HOPELESS: 1
SUM OF ALL RESPONSES TO PHQ9 QUESTIONS 1 & 2: 2
SUM OF ALL RESPONSES TO PHQ QUESTIONS 1-9: 2

## 2018-08-02 ENCOUNTER — OFFICE VISIT (OUTPATIENT)
Dept: RHEUMATOLOGY | Age: 63
End: 2018-08-02

## 2018-08-02 VITALS
BODY MASS INDEX: 26.2 KG/M2 | DIASTOLIC BLOOD PRESSURE: 80 MMHG | HEIGHT: 70 IN | TEMPERATURE: 98.1 F | SYSTOLIC BLOOD PRESSURE: 126 MMHG | WEIGHT: 183 LBS

## 2018-08-02 DIAGNOSIS — M1A.9XX0 CHRONIC GOUT WITHOUT TOPHUS, UNSPECIFIED CAUSE, UNSPECIFIED SITE: ICD-10-CM

## 2018-08-02 DIAGNOSIS — M1A.9XX0 CHRONIC GOUT WITHOUT TOPHUS, UNSPECIFIED CAUSE, UNSPECIFIED SITE: Primary | ICD-10-CM

## 2018-08-02 DIAGNOSIS — M15.9 GENERALIZED OSTEOARTHRITIS: ICD-10-CM

## 2018-08-02 LAB
BASOPHILS ABSOLUTE: 0 K/UL (ref 0–0.2)
BASOPHILS RELATIVE PERCENT: 0.4 %
EOSINOPHILS ABSOLUTE: 0.3 K/UL (ref 0–0.6)
EOSINOPHILS RELATIVE PERCENT: 3.7 %
HCT VFR BLD CALC: 43.6 % (ref 40.5–52.5)
HEMOGLOBIN: 14.8 G/DL (ref 13.5–17.5)
LYMPHOCYTES ABSOLUTE: 2.1 K/UL (ref 1–5.1)
LYMPHOCYTES RELATIVE PERCENT: 29.9 %
MCH RBC QN AUTO: 32.1 PG (ref 26–34)
MCHC RBC AUTO-ENTMCNC: 33.9 G/DL (ref 31–36)
MCV RBC AUTO: 94.6 FL (ref 80–100)
MONOCYTES ABSOLUTE: 0.6 K/UL (ref 0–1.3)
MONOCYTES RELATIVE PERCENT: 9 %
NEUTROPHILS ABSOLUTE: 4 K/UL (ref 1.7–7.7)
NEUTROPHILS RELATIVE PERCENT: 57 %
PDW BLD-RTO: 12.6 % (ref 12.4–15.4)
PLATELET # BLD: 173 K/UL (ref 135–450)
PMV BLD AUTO: 8.6 FL (ref 5–10.5)
RBC # BLD: 4.6 M/UL (ref 4.2–5.9)
WBC # BLD: 7.1 K/UL (ref 4–11)

## 2018-08-02 PROCEDURE — G8417 CALC BMI ABV UP PARAM F/U: HCPCS | Performed by: INTERNAL MEDICINE

## 2018-08-02 PROCEDURE — 99214 OFFICE O/P EST MOD 30 MIN: CPT | Performed by: INTERNAL MEDICINE

## 2018-08-02 PROCEDURE — 1101F PT FALLS ASSESS-DOCD LE1/YR: CPT | Performed by: INTERNAL MEDICINE

## 2018-08-02 PROCEDURE — G8427 DOCREV CUR MEDS BY ELIG CLIN: HCPCS | Performed by: INTERNAL MEDICINE

## 2018-08-02 PROCEDURE — 4040F PNEUMOC VAC/ADMIN/RCVD: CPT | Performed by: INTERNAL MEDICINE

## 2018-08-02 PROCEDURE — 1036F TOBACCO NON-USER: CPT | Performed by: INTERNAL MEDICINE

## 2018-08-02 PROCEDURE — 1123F ACP DISCUSS/DSCN MKR DOCD: CPT | Performed by: INTERNAL MEDICINE

## 2018-08-02 PROCEDURE — 3017F COLORECTAL CA SCREEN DOC REV: CPT | Performed by: INTERNAL MEDICINE

## 2018-08-02 RX ORDER — FEBUXOSTAT 40 MG/1
40 TABLET, FILM COATED ORAL DAILY
Qty: 90 TABLET | Refills: 1 | Status: SHIPPED | OUTPATIENT
Start: 2018-08-02 | End: 2018-08-16 | Stop reason: SDUPTHER

## 2018-08-02 RX ORDER — COLCHICINE 0.6 MG/1
TABLET ORAL
Qty: 30 TABLET | Refills: 1 | Status: SHIPPED | OUTPATIENT
Start: 2018-08-02 | End: 2019-02-14 | Stop reason: SDUPTHER

## 2018-08-02 RX ORDER — GLUCOSAM/CHON-MSM1/C/MANG/BOSW 500-416.6
TABLET ORAL
Qty: 150 EACH | Refills: 2 | Status: SHIPPED | OUTPATIENT
Start: 2018-08-02 | End: 2018-08-16 | Stop reason: SDUPTHER

## 2018-08-02 NOTE — PROGRESS NOTES
MCV 90.5 10/06/2016    MCH 30.9 10/06/2016    MCHC 34.1 10/06/2016    RDW 13.6 10/06/2016    LYMPHOPCT 28.4 10/06/2016    MONOPCT 9.1 10/06/2016    BASOPCT 0.7 10/06/2016    MONOSABS 0.6 10/06/2016    LYMPHSABS 2.0 10/06/2016    EOSABS 0.3 10/06/2016    BASOSABS 0.0 10/06/2016       Chemistry        Component Value Date/Time     02/08/2018 1518    K 4.7 02/08/2018 1518     02/08/2018 1518    CO2 26 02/08/2018 1518    BUN 33 (H) 02/08/2018 1518    CREATININE 1.3 02/08/2018 1518        Component Value Date/Time    CALCIUM 9.7 02/08/2018 1518    ALKPHOS 59 02/08/2018 1518    AST 26 02/08/2018 1518    ALT 22 02/08/2018 1518    BILITOT 1.1 (H) 02/08/2018 1518          Lab Results   Component Value Date    SEDRATE 14 08/18/2016     MRI knee-9 s2016-mild tricompartmental osteoarthritis. I thank you for giving me the opportunity to be involved in [address] [place] [address] and I look forward following Jax Poole along with you. If you have any questions or concerns please feel free to contact me at any time.     Huy Wyman MD 08/02/18

## 2018-08-03 LAB
A/G RATIO: 1.9 (ref 1.1–2.2)
ALBUMIN SERPL-MCNC: 4.5 G/DL (ref 3.4–5)
ALP BLD-CCNC: 58 U/L (ref 40–129)
ALT SERPL-CCNC: 19 U/L (ref 10–40)
ANION GAP SERPL CALCULATED.3IONS-SCNC: 12 MMOL/L (ref 3–16)
AST SERPL-CCNC: 22 U/L (ref 15–37)
BILIRUB SERPL-MCNC: 0.8 MG/DL (ref 0–1)
BUN BLDV-MCNC: 24 MG/DL (ref 7–20)
CALCIUM SERPL-MCNC: 9.5 MG/DL (ref 8.3–10.6)
CHLORIDE BLD-SCNC: 107 MMOL/L (ref 99–110)
CO2: 25 MMOL/L (ref 21–32)
CREAT SERPL-MCNC: 1.2 MG/DL (ref 0.8–1.3)
GFR AFRICAN AMERICAN: >60
GFR NON-AFRICAN AMERICAN: >60
GLOBULIN: 2.4 G/DL
GLUCOSE BLD-MCNC: 103 MG/DL (ref 70–99)
POTASSIUM SERPL-SCNC: 4.5 MMOL/L (ref 3.5–5.1)
SODIUM BLD-SCNC: 144 MMOL/L (ref 136–145)
TOTAL PROTEIN: 6.9 G/DL (ref 6.4–8.2)
URIC ACID, SERUM: 3.2 MG/DL (ref 3.5–7.2)

## 2018-08-16 ENCOUNTER — OFFICE VISIT (OUTPATIENT)
Dept: ENDOCRINOLOGY | Age: 63
End: 2018-08-16

## 2018-08-16 VITALS
BODY MASS INDEX: 26.57 KG/M2 | DIASTOLIC BLOOD PRESSURE: 68 MMHG | HEIGHT: 70 IN | SYSTOLIC BLOOD PRESSURE: 114 MMHG | WEIGHT: 185.6 LBS

## 2018-08-16 DIAGNOSIS — E78.2 MIXED HYPERLIPIDEMIA: ICD-10-CM

## 2018-08-16 DIAGNOSIS — E11.21 TYPE 2 DIABETES MELLITUS WITH DIABETIC NEPHROPATHY, UNSPECIFIED WHETHER LONG TERM INSULIN USE (HCC): Primary | ICD-10-CM

## 2018-08-16 DIAGNOSIS — I10 ESSENTIAL HYPERTENSION: ICD-10-CM

## 2018-08-16 LAB — HBA1C MFR BLD: 6.4 %

## 2018-08-16 PROCEDURE — G8417 CALC BMI ABV UP PARAM F/U: HCPCS | Performed by: INTERNAL MEDICINE

## 2018-08-16 PROCEDURE — 4040F PNEUMOC VAC/ADMIN/RCVD: CPT | Performed by: INTERNAL MEDICINE

## 2018-08-16 PROCEDURE — 2022F DILAT RTA XM EVC RTNOPTHY: CPT | Performed by: INTERNAL MEDICINE

## 2018-08-16 PROCEDURE — G8427 DOCREV CUR MEDS BY ELIG CLIN: HCPCS | Performed by: INTERNAL MEDICINE

## 2018-08-16 PROCEDURE — 1036F TOBACCO NON-USER: CPT | Performed by: INTERNAL MEDICINE

## 2018-08-16 PROCEDURE — 83036 HEMOGLOBIN GLYCOSYLATED A1C: CPT | Performed by: INTERNAL MEDICINE

## 2018-08-16 PROCEDURE — 1101F PT FALLS ASSESS-DOCD LE1/YR: CPT | Performed by: INTERNAL MEDICINE

## 2018-08-16 PROCEDURE — 3044F HG A1C LEVEL LT 7.0%: CPT | Performed by: INTERNAL MEDICINE

## 2018-08-16 PROCEDURE — 3017F COLORECTAL CA SCREEN DOC REV: CPT | Performed by: INTERNAL MEDICINE

## 2018-08-16 PROCEDURE — 99213 OFFICE O/P EST LOW 20 MIN: CPT | Performed by: INTERNAL MEDICINE

## 2018-08-16 PROCEDURE — 1123F ACP DISCUSS/DSCN MKR DOCD: CPT | Performed by: INTERNAL MEDICINE

## 2018-08-16 RX ORDER — GLUCOSAM/CHON-MSM1/C/MANG/BOSW 500-416.6
TABLET ORAL
Qty: 150 EACH | Refills: 5 | Status: SHIPPED | OUTPATIENT
Start: 2018-08-16 | End: 2019-02-25 | Stop reason: SDUPTHER

## 2018-09-13 ENCOUNTER — OFFICE VISIT (OUTPATIENT)
Dept: FAMILY MEDICINE CLINIC | Age: 63
End: 2018-09-13

## 2018-09-13 VITALS
SYSTOLIC BLOOD PRESSURE: 120 MMHG | BODY MASS INDEX: 26.48 KG/M2 | DIASTOLIC BLOOD PRESSURE: 70 MMHG | HEIGHT: 70 IN | WEIGHT: 185 LBS

## 2018-09-13 DIAGNOSIS — F40.00 AGORAPHOBIA: ICD-10-CM

## 2018-09-13 DIAGNOSIS — K21.9 GASTROESOPHAGEAL REFLUX DISEASE WITHOUT ESOPHAGITIS: ICD-10-CM

## 2018-09-13 DIAGNOSIS — F41.0 PANIC DISORDER: ICD-10-CM

## 2018-09-13 DIAGNOSIS — E78.2 MIXED HYPERLIPIDEMIA: ICD-10-CM

## 2018-09-13 DIAGNOSIS — F41.9 ANXIETY: ICD-10-CM

## 2018-09-13 DIAGNOSIS — E11.21 TYPE 2 DIABETES MELLITUS WITH DIABETIC NEPHROPATHY, UNSPECIFIED WHETHER LONG TERM INSULIN USE (HCC): Primary | ICD-10-CM

## 2018-09-13 DIAGNOSIS — I10 ESSENTIAL HYPERTENSION: ICD-10-CM

## 2018-09-13 PROCEDURE — 1123F ACP DISCUSS/DSCN MKR DOCD: CPT | Performed by: FAMILY MEDICINE

## 2018-09-13 PROCEDURE — 1101F PT FALLS ASSESS-DOCD LE1/YR: CPT | Performed by: FAMILY MEDICINE

## 2018-09-13 PROCEDURE — 3044F HG A1C LEVEL LT 7.0%: CPT | Performed by: FAMILY MEDICINE

## 2018-09-13 PROCEDURE — 4040F PNEUMOC VAC/ADMIN/RCVD: CPT | Performed by: FAMILY MEDICINE

## 2018-09-13 PROCEDURE — 1036F TOBACCO NON-USER: CPT | Performed by: FAMILY MEDICINE

## 2018-09-13 PROCEDURE — 3017F COLORECTAL CA SCREEN DOC REV: CPT | Performed by: FAMILY MEDICINE

## 2018-09-13 PROCEDURE — G8417 CALC BMI ABV UP PARAM F/U: HCPCS | Performed by: FAMILY MEDICINE

## 2018-09-13 PROCEDURE — 99214 OFFICE O/P EST MOD 30 MIN: CPT | Performed by: FAMILY MEDICINE

## 2018-09-13 PROCEDURE — G8427 DOCREV CUR MEDS BY ELIG CLIN: HCPCS | Performed by: FAMILY MEDICINE

## 2018-09-13 PROCEDURE — 2022F DILAT RTA XM EVC RTNOPTHY: CPT | Performed by: FAMILY MEDICINE

## 2018-09-13 RX ORDER — FAMOTIDINE 20 MG/1
20 TABLET, FILM COATED ORAL NIGHTLY PRN
Qty: 30 TABLET | Refills: 3 | Status: SHIPPED | OUTPATIENT
Start: 2018-09-13 | End: 2018-12-13 | Stop reason: SDUPTHER

## 2018-09-13 RX ORDER — ALPRAZOLAM 1 MG/1
TABLET ORAL
Qty: 100 TABLET | Refills: 2 | Status: SHIPPED | OUTPATIENT
Start: 2018-09-13 | End: 2018-12-13 | Stop reason: SDUPTHER

## 2018-09-13 RX ORDER — ROSUVASTATIN CALCIUM 10 MG/1
10 TABLET, COATED ORAL NIGHTLY
Qty: 30 TABLET | Refills: 3 | Status: SHIPPED | OUTPATIENT
Start: 2018-09-13 | End: 2018-09-13 | Stop reason: SDUPTHER

## 2018-09-13 RX ORDER — ROSUVASTATIN CALCIUM 10 MG/1
10 TABLET, COATED ORAL NIGHTLY
Qty: 30 TABLET | Refills: 3 | Status: SHIPPED | OUTPATIENT
Start: 2018-09-13 | End: 2018-12-13 | Stop reason: SDUPTHER

## 2018-09-13 ASSESSMENT — PATIENT HEALTH QUESTIONNAIRE - PHQ9
SUM OF ALL RESPONSES TO PHQ QUESTIONS 1-9: 2
2. FEELING DOWN, DEPRESSED OR HOPELESS: 1
SUM OF ALL RESPONSES TO PHQ QUESTIONS 1-9: 2
SUM OF ALL RESPONSES TO PHQ9 QUESTIONS 1 & 2: 2
1. LITTLE INTEREST OR PLEASURE IN DOING THINGS: 1

## 2018-09-13 NOTE — PROGRESS NOTES
Gastrointestinal: Negative for abdominal pain. Endocrine: Negative for polyuria. Genitourinary: Negative for difficulty urinating. Neurological: Negative for speech difficulty, numbness and headaches. Psychiatric/Behavioral: Positive for decreased concentration. Negative for confusion and sleep disturbance. The patient is nervous/anxious. A complete 14 point  review of systems was completed; pertinent positives are noted in HPI    Vitals:    09/13/18 1510   BP: 120/70   Weight: 185 lb (83.9 kg)   Height: 5' 10\" (1.778 m)     Body mass index is 26.54 kg/m². Wt Readings from Last 3 Encounters:   09/13/18 185 lb (83.9 kg)   08/16/18 185 lb 9.6 oz (84.2 kg)   08/02/18 183 lb (83 kg)     BP Readings from Last 3 Encounters:   09/13/18 120/70   08/16/18 114/68   08/02/18 126/80        /70   Ht 5' 10\" (1.778 m)   Wt 185 lb (83.9 kg)   BMI 26.54 kg/m²    Objective:   Physical Exam   Constitutional: He is oriented to person, place, and time. He appears well-developed. No distress. HENT:   Right Ear: External ear normal.   Left Ear: External ear normal.   Nose: Nose normal.   Mouth/Throat: Oropharynx is clear and moist.   Eyes: Conjunctivae are normal. No scleral icterus. Neck: Neck supple. No thyromegaly present. Cardiovascular: Normal rate, regular rhythm, normal heart sounds and intact distal pulses. No murmur heard. Pulmonary/Chest: Effort normal and breath sounds normal. No respiratory distress. Abdominal: Soft. Bowel sounds are normal. There is no tenderness. Musculoskeletal: He exhibits no edema. Lymphadenopathy:     He has no cervical adenopathy. Neurological: He is alert and oriented to person, place, and time. Skin: Skin is warm. No rash noted. Psychiatric: He has a normal mood and affect. Thought content normal.   anxious       Assessment:      Assessment/Plan:  Melony Nash was seen today for 3 month follow-up, discuss medications and extremity weakness.     Diagnoses

## 2018-09-17 ASSESSMENT — ENCOUNTER SYMPTOMS
SHORTNESS OF BREATH: 0
COUGH: 0
WHEEZING: 0
ABDOMINAL PAIN: 0
EYE PAIN: 0

## 2018-10-08 ENCOUNTER — TELEPHONE (OUTPATIENT)
Dept: RHEUMATOLOGY | Age: 63
End: 2018-10-08

## 2018-10-12 ENCOUNTER — TELEPHONE (OUTPATIENT)
Dept: FAMILY MEDICINE CLINIC | Age: 63
End: 2018-10-12

## 2018-10-16 NOTE — TELEPHONE ENCOUNTER
Called pt he states he will try the zocor and she how the pains do? (claims they weren't as bad as med he was taking )  He just got a refill, if still will issues will come  In  and discuss other options,  He is not to keen on injections q 2 wks  Also, can you write him a letter for the handicap placard, states the rheumatologist only put 3 mos on it and we all know he will not be getting better(osteoarthritis)  please advise

## 2018-12-07 ENCOUNTER — TELEPHONE (OUTPATIENT)
Dept: FAMILY MEDICINE CLINIC | Age: 63
End: 2018-12-07

## 2018-12-13 DIAGNOSIS — K21.9 GASTROESOPHAGEAL REFLUX DISEASE WITHOUT ESOPHAGITIS: ICD-10-CM

## 2018-12-13 DIAGNOSIS — E78.2 MIXED HYPERLIPIDEMIA: ICD-10-CM

## 2018-12-13 DIAGNOSIS — F41.9 ANXIETY: ICD-10-CM

## 2018-12-13 DIAGNOSIS — F40.00 AGORAPHOBIA: ICD-10-CM

## 2018-12-13 DIAGNOSIS — F41.0 PANIC DISORDER: ICD-10-CM

## 2018-12-13 RX ORDER — ROSUVASTATIN CALCIUM 10 MG/1
10 TABLET, COATED ORAL NIGHTLY
Qty: 30 TABLET | Refills: 0 | Status: SHIPPED | OUTPATIENT
Start: 2018-12-13 | End: 2018-12-17 | Stop reason: SINTOL

## 2018-12-13 RX ORDER — FAMOTIDINE 20 MG/1
20 TABLET, FILM COATED ORAL NIGHTLY PRN
Qty: 30 TABLET | Refills: 0 | Status: SHIPPED | OUTPATIENT
Start: 2018-12-13 | End: 2018-12-17 | Stop reason: SDUPTHER

## 2018-12-13 RX ORDER — ALPRAZOLAM 1 MG/1
TABLET ORAL
Qty: 100 TABLET | Refills: 0 | Status: SHIPPED | OUTPATIENT
Start: 2018-12-13 | End: 2018-12-17 | Stop reason: SDUPTHER

## 2018-12-15 ENCOUNTER — TELEPHONE (OUTPATIENT)
Dept: FAMILY MEDICINE CLINIC | Age: 63
End: 2018-12-15

## 2018-12-17 ENCOUNTER — OFFICE VISIT (OUTPATIENT)
Dept: FAMILY MEDICINE CLINIC | Age: 63
End: 2018-12-17
Payer: COMMERCIAL

## 2018-12-17 VITALS
DIASTOLIC BLOOD PRESSURE: 80 MMHG | BODY MASS INDEX: 26.48 KG/M2 | WEIGHT: 185 LBS | SYSTOLIC BLOOD PRESSURE: 126 MMHG | HEIGHT: 70 IN

## 2018-12-17 DIAGNOSIS — E78.2 MIXED HYPERLIPIDEMIA: ICD-10-CM

## 2018-12-17 DIAGNOSIS — I10 ESSENTIAL HYPERTENSION: ICD-10-CM

## 2018-12-17 DIAGNOSIS — F40.00 AGORAPHOBIA: ICD-10-CM

## 2018-12-17 DIAGNOSIS — K21.9 GASTROESOPHAGEAL REFLUX DISEASE WITHOUT ESOPHAGITIS: ICD-10-CM

## 2018-12-17 DIAGNOSIS — F41.9 ANXIETY: Primary | ICD-10-CM

## 2018-12-17 DIAGNOSIS — F41.0 PANIC DISORDER: ICD-10-CM

## 2018-12-17 DIAGNOSIS — E11.21 TYPE 2 DIABETES MELLITUS WITH DIABETIC NEPHROPATHY, UNSPECIFIED WHETHER LONG TERM INSULIN USE (HCC): ICD-10-CM

## 2018-12-17 PROCEDURE — G8484 FLU IMMUNIZE NO ADMIN: HCPCS | Performed by: FAMILY MEDICINE

## 2018-12-17 PROCEDURE — 2022F DILAT RTA XM EVC RTNOPTHY: CPT | Performed by: FAMILY MEDICINE

## 2018-12-17 PROCEDURE — 1123F ACP DISCUSS/DSCN MKR DOCD: CPT | Performed by: FAMILY MEDICINE

## 2018-12-17 PROCEDURE — 4040F PNEUMOC VAC/ADMIN/RCVD: CPT | Performed by: FAMILY MEDICINE

## 2018-12-17 PROCEDURE — 3017F COLORECTAL CA SCREEN DOC REV: CPT | Performed by: FAMILY MEDICINE

## 2018-12-17 PROCEDURE — 1101F PT FALLS ASSESS-DOCD LE1/YR: CPT | Performed by: FAMILY MEDICINE

## 2018-12-17 PROCEDURE — 3044F HG A1C LEVEL LT 7.0%: CPT | Performed by: FAMILY MEDICINE

## 2018-12-17 PROCEDURE — 1036F TOBACCO NON-USER: CPT | Performed by: FAMILY MEDICINE

## 2018-12-17 PROCEDURE — 99213 OFFICE O/P EST LOW 20 MIN: CPT | Performed by: FAMILY MEDICINE

## 2018-12-17 PROCEDURE — G8417 CALC BMI ABV UP PARAM F/U: HCPCS | Performed by: FAMILY MEDICINE

## 2018-12-17 PROCEDURE — G8427 DOCREV CUR MEDS BY ELIG CLIN: HCPCS | Performed by: FAMILY MEDICINE

## 2018-12-17 RX ORDER — SIMVASTATIN 20 MG
20 TABLET ORAL EVERY EVENING
Qty: 30 TABLET | Refills: 2 | Status: SHIPPED | OUTPATIENT
Start: 2018-12-17 | End: 2018-12-17

## 2018-12-17 RX ORDER — ALPRAZOLAM 1 MG/1
TABLET ORAL
Qty: 100 TABLET | Refills: 1 | Status: SHIPPED | OUTPATIENT
Start: 2018-12-17 | End: 2019-01-17

## 2018-12-17 RX ORDER — SIMVASTATIN 20 MG
20 TABLET ORAL EVERY EVENING
Qty: 30 TABLET | Refills: 2 | Status: SHIPPED | OUTPATIENT
Start: 2018-12-17 | End: 2019-03-14 | Stop reason: SDUPTHER

## 2018-12-17 RX ORDER — FAMOTIDINE 20 MG/1
20 TABLET, FILM COATED ORAL NIGHTLY PRN
Qty: 30 TABLET | Refills: 1 | Status: SHIPPED | OUTPATIENT
Start: 2018-12-17 | End: 2019-03-14 | Stop reason: SDUPTHER

## 2018-12-17 RX ORDER — SIMVASTATIN 20 MG
20 TABLET ORAL EVERY EVENING
Qty: 30 TABLET | Refills: 2 | Status: SHIPPED | OUTPATIENT
Start: 2018-12-17 | End: 2018-12-17 | Stop reason: SDUPTHER

## 2018-12-17 ASSESSMENT — ENCOUNTER SYMPTOMS
EYE PAIN: 0
SHORTNESS OF BREATH: 0
WHEEZING: 0
ABDOMINAL PAIN: 0
COUGH: 0

## 2018-12-17 ASSESSMENT — PATIENT HEALTH QUESTIONNAIRE - PHQ9
SUM OF ALL RESPONSES TO PHQ QUESTIONS 1-9: 1
1. LITTLE INTEREST OR PLEASURE IN DOING THINGS: 1
SUM OF ALL RESPONSES TO PHQ QUESTIONS 1-9: 1
SUM OF ALL RESPONSES TO PHQ9 QUESTIONS 1 & 2: 1
2. FEELING DOWN, DEPRESSED OR HOPELESS: 0

## 2018-12-18 LAB
A/G RATIO: 1.6 (ref 1.1–2.2)
ALBUMIN SERPL-MCNC: 4.2 G/DL (ref 3.4–5)
ALP BLD-CCNC: 57 U/L (ref 40–129)
ALT SERPL-CCNC: 20 U/L (ref 10–40)
ANION GAP SERPL CALCULATED.3IONS-SCNC: 15 MMOL/L (ref 3–16)
AST SERPL-CCNC: 20 U/L (ref 15–37)
BILIRUB SERPL-MCNC: 1.3 MG/DL (ref 0–1)
BUN BLDV-MCNC: 26 MG/DL (ref 7–20)
CALCIUM SERPL-MCNC: 9.3 MG/DL (ref 8.3–10.6)
CHLORIDE BLD-SCNC: 101 MMOL/L (ref 99–110)
CHOLESTEROL, TOTAL: 163 MG/DL (ref 0–199)
CO2: 24 MMOL/L (ref 21–32)
CREAT SERPL-MCNC: 1.3 MG/DL (ref 0.8–1.3)
GFR AFRICAN AMERICAN: >60
GFR NON-AFRICAN AMERICAN: 55
GLOBULIN: 2.7 G/DL
GLUCOSE BLD-MCNC: 108 MG/DL (ref 70–99)
HCT VFR BLD CALC: 43.7 % (ref 40.5–52.5)
HDLC SERPL-MCNC: 43 MG/DL (ref 40–60)
HEMOGLOBIN: 14.8 G/DL (ref 13.5–17.5)
LDL CHOLESTEROL CALCULATED: 103 MG/DL
MCH RBC QN AUTO: 31.8 PG (ref 26–34)
MCHC RBC AUTO-ENTMCNC: 34 G/DL (ref 31–36)
MCV RBC AUTO: 93.8 FL (ref 80–100)
PDW BLD-RTO: 12.8 % (ref 12.4–15.4)
PLATELET # BLD: 146 K/UL (ref 135–450)
PMV BLD AUTO: 8.9 FL (ref 5–10.5)
POTASSIUM SERPL-SCNC: 4.4 MMOL/L (ref 3.5–5.1)
RBC # BLD: 4.66 M/UL (ref 4.2–5.9)
SODIUM BLD-SCNC: 140 MMOL/L (ref 136–145)
TOTAL PROTEIN: 6.9 G/DL (ref 6.4–8.2)
TRIGL SERPL-MCNC: 85 MG/DL (ref 0–150)
TSH REFLEX: 2.42 UIU/ML (ref 0.27–4.2)
VLDLC SERPL CALC-MCNC: 17 MG/DL
WBC # BLD: 7.1 K/UL (ref 4–11)

## 2018-12-19 ENCOUNTER — TELEPHONE (OUTPATIENT)
Dept: FAMILY MEDICINE CLINIC | Age: 63
End: 2018-12-19

## 2018-12-19 LAB
ESTIMATED AVERAGE GLUCOSE: 148.5 MG/DL
HBA1C MFR BLD: 6.8 %

## 2019-02-07 ENCOUNTER — OFFICE VISIT (OUTPATIENT)
Dept: RHEUMATOLOGY | Age: 64
End: 2019-02-07
Payer: COMMERCIAL

## 2019-02-07 VITALS
WEIGHT: 190 LBS | BODY MASS INDEX: 27.2 KG/M2 | SYSTOLIC BLOOD PRESSURE: 134 MMHG | HEIGHT: 70 IN | DIASTOLIC BLOOD PRESSURE: 82 MMHG

## 2019-02-07 DIAGNOSIS — M1A.9XX0 CHRONIC GOUT WITHOUT TOPHUS, UNSPECIFIED CAUSE, UNSPECIFIED SITE: Primary | ICD-10-CM

## 2019-02-07 DIAGNOSIS — M1A.9XX0 CHRONIC GOUT WITHOUT TOPHUS, UNSPECIFIED CAUSE, UNSPECIFIED SITE: ICD-10-CM

## 2019-02-07 DIAGNOSIS — M15.9 GENERALIZED OSTEOARTHRITIS: ICD-10-CM

## 2019-02-07 LAB
A/G RATIO: 1.8 (ref 1.1–2.2)
ALBUMIN SERPL-MCNC: 4.5 G/DL (ref 3.4–5)
ALP BLD-CCNC: 59 U/L (ref 40–129)
ALT SERPL-CCNC: 22 U/L (ref 10–40)
ANION GAP SERPL CALCULATED.3IONS-SCNC: 13 MMOL/L (ref 3–16)
AST SERPL-CCNC: 21 U/L (ref 15–37)
BILIRUB SERPL-MCNC: 0.9 MG/DL (ref 0–1)
BUN BLDV-MCNC: 30 MG/DL (ref 7–20)
CALCIUM SERPL-MCNC: 9.4 MG/DL (ref 8.3–10.6)
CHLORIDE BLD-SCNC: 103 MMOL/L (ref 99–110)
CO2: 25 MMOL/L (ref 21–32)
CREAT SERPL-MCNC: 1.3 MG/DL (ref 0.8–1.3)
GFR AFRICAN AMERICAN: >60
GFR NON-AFRICAN AMERICAN: 55
GLOBULIN: 2.5 G/DL
GLUCOSE BLD-MCNC: 131 MG/DL (ref 70–99)
POTASSIUM SERPL-SCNC: 4.5 MMOL/L (ref 3.5–5.1)
SODIUM BLD-SCNC: 141 MMOL/L (ref 136–145)
TOTAL PROTEIN: 7 G/DL (ref 6.4–8.2)
URIC ACID, SERUM: 3 MG/DL (ref 3.5–7.2)

## 2019-02-07 PROCEDURE — G8417 CALC BMI ABV UP PARAM F/U: HCPCS | Performed by: INTERNAL MEDICINE

## 2019-02-07 PROCEDURE — 3017F COLORECTAL CA SCREEN DOC REV: CPT | Performed by: INTERNAL MEDICINE

## 2019-02-07 PROCEDURE — 4040F PNEUMOC VAC/ADMIN/RCVD: CPT | Performed by: INTERNAL MEDICINE

## 2019-02-07 PROCEDURE — G8427 DOCREV CUR MEDS BY ELIG CLIN: HCPCS | Performed by: INTERNAL MEDICINE

## 2019-02-07 PROCEDURE — G8484 FLU IMMUNIZE NO ADMIN: HCPCS | Performed by: INTERNAL MEDICINE

## 2019-02-07 PROCEDURE — 99213 OFFICE O/P EST LOW 20 MIN: CPT | Performed by: INTERNAL MEDICINE

## 2019-02-07 PROCEDURE — 1036F TOBACCO NON-USER: CPT | Performed by: INTERNAL MEDICINE

## 2019-02-07 PROCEDURE — 1101F PT FALLS ASSESS-DOCD LE1/YR: CPT | Performed by: INTERNAL MEDICINE

## 2019-02-07 PROCEDURE — 1123F ACP DISCUSS/DSCN MKR DOCD: CPT | Performed by: INTERNAL MEDICINE

## 2019-02-15 RX ORDER — COLCHICINE 0.6 MG/1
TABLET ORAL
Qty: 30 TABLET | Refills: 1 | Status: SHIPPED | OUTPATIENT
Start: 2019-02-15 | End: 2019-03-18 | Stop reason: SDUPTHER

## 2019-02-21 ENCOUNTER — OFFICE VISIT (OUTPATIENT)
Dept: ENDOCRINOLOGY | Age: 64
End: 2019-02-21
Payer: COMMERCIAL

## 2019-02-21 VITALS
SYSTOLIC BLOOD PRESSURE: 126 MMHG | WEIGHT: 187.2 LBS | DIASTOLIC BLOOD PRESSURE: 70 MMHG | BODY MASS INDEX: 26.8 KG/M2 | HEIGHT: 70 IN

## 2019-02-21 DIAGNOSIS — I10 ESSENTIAL HYPERTENSION: ICD-10-CM

## 2019-02-21 DIAGNOSIS — E78.2 MIXED HYPERLIPIDEMIA: ICD-10-CM

## 2019-02-21 DIAGNOSIS — E11.21 TYPE 2 DIABETES MELLITUS WITH DIABETIC NEPHROPATHY, UNSPECIFIED WHETHER LONG TERM INSULIN USE (HCC): Primary | ICD-10-CM

## 2019-02-21 LAB — HBA1C MFR BLD: 6.9 %

## 2019-02-21 PROCEDURE — 4040F PNEUMOC VAC/ADMIN/RCVD: CPT | Performed by: INTERNAL MEDICINE

## 2019-02-21 PROCEDURE — G8417 CALC BMI ABV UP PARAM F/U: HCPCS | Performed by: INTERNAL MEDICINE

## 2019-02-21 PROCEDURE — G8484 FLU IMMUNIZE NO ADMIN: HCPCS | Performed by: INTERNAL MEDICINE

## 2019-02-21 PROCEDURE — 2022F DILAT RTA XM EVC RTNOPTHY: CPT | Performed by: INTERNAL MEDICINE

## 2019-02-21 PROCEDURE — 99214 OFFICE O/P EST MOD 30 MIN: CPT | Performed by: INTERNAL MEDICINE

## 2019-02-21 PROCEDURE — 3044F HG A1C LEVEL LT 7.0%: CPT | Performed by: INTERNAL MEDICINE

## 2019-02-21 PROCEDURE — 3017F COLORECTAL CA SCREEN DOC REV: CPT | Performed by: INTERNAL MEDICINE

## 2019-02-21 PROCEDURE — 1101F PT FALLS ASSESS-DOCD LE1/YR: CPT | Performed by: INTERNAL MEDICINE

## 2019-02-21 PROCEDURE — 1123F ACP DISCUSS/DSCN MKR DOCD: CPT | Performed by: INTERNAL MEDICINE

## 2019-02-21 PROCEDURE — 1036F TOBACCO NON-USER: CPT | Performed by: INTERNAL MEDICINE

## 2019-02-21 PROCEDURE — 83036 HEMOGLOBIN GLYCOSYLATED A1C: CPT | Performed by: INTERNAL MEDICINE

## 2019-02-21 PROCEDURE — G8427 DOCREV CUR MEDS BY ELIG CLIN: HCPCS | Performed by: INTERNAL MEDICINE

## 2019-02-21 RX ORDER — ALPRAZOLAM 1 MG/1
TABLET ORAL
COMMUNITY
Start: 2019-02-12 | End: 2019-03-14 | Stop reason: SDUPTHER

## 2019-02-25 RX ORDER — GLUCOSAM/CHON-MSM1/C/MANG/BOSW 500-416.6
TABLET ORAL
Qty: 720 EACH | Refills: 5 | Status: SHIPPED | OUTPATIENT
Start: 2019-02-25 | End: 2019-08-01 | Stop reason: SDUPTHER

## 2019-03-14 ENCOUNTER — OFFICE VISIT (OUTPATIENT)
Dept: FAMILY MEDICINE CLINIC | Age: 64
End: 2019-03-14
Payer: COMMERCIAL

## 2019-03-14 VITALS
WEIGHT: 189.2 LBS | DIASTOLIC BLOOD PRESSURE: 70 MMHG | SYSTOLIC BLOOD PRESSURE: 130 MMHG | HEIGHT: 70 IN | BODY MASS INDEX: 27.09 KG/M2

## 2019-03-14 DIAGNOSIS — E78.2 MIXED HYPERLIPIDEMIA: ICD-10-CM

## 2019-03-14 DIAGNOSIS — Z23 NEED FOR PNEUMOCOCCAL VACCINATION: ICD-10-CM

## 2019-03-14 DIAGNOSIS — E11.21 TYPE 2 DIABETES MELLITUS WITH DIABETIC NEPHROPATHY, UNSPECIFIED WHETHER LONG TERM INSULIN USE (HCC): ICD-10-CM

## 2019-03-14 DIAGNOSIS — F41.0 PANIC DISORDER: Primary | ICD-10-CM

## 2019-03-14 DIAGNOSIS — I10 ESSENTIAL HYPERTENSION: ICD-10-CM

## 2019-03-14 DIAGNOSIS — K21.9 GASTROESOPHAGEAL REFLUX DISEASE WITHOUT ESOPHAGITIS: ICD-10-CM

## 2019-03-14 DIAGNOSIS — F41.9 ANXIETY: ICD-10-CM

## 2019-03-14 DIAGNOSIS — R06.09 EXERTIONAL DYSPNEA: ICD-10-CM

## 2019-03-14 PROCEDURE — 1123F ACP DISCUSS/DSCN MKR DOCD: CPT | Performed by: FAMILY MEDICINE

## 2019-03-14 PROCEDURE — 3017F COLORECTAL CA SCREEN DOC REV: CPT | Performed by: FAMILY MEDICINE

## 2019-03-14 PROCEDURE — G8427 DOCREV CUR MEDS BY ELIG CLIN: HCPCS | Performed by: FAMILY MEDICINE

## 2019-03-14 PROCEDURE — 2022F DILAT RTA XM EVC RTNOPTHY: CPT | Performed by: FAMILY MEDICINE

## 2019-03-14 PROCEDURE — 1036F TOBACCO NON-USER: CPT | Performed by: FAMILY MEDICINE

## 2019-03-14 PROCEDURE — G8484 FLU IMMUNIZE NO ADMIN: HCPCS | Performed by: FAMILY MEDICINE

## 2019-03-14 PROCEDURE — 3044F HG A1C LEVEL LT 7.0%: CPT | Performed by: FAMILY MEDICINE

## 2019-03-14 PROCEDURE — G8417 CALC BMI ABV UP PARAM F/U: HCPCS | Performed by: FAMILY MEDICINE

## 2019-03-14 PROCEDURE — 4040F PNEUMOC VAC/ADMIN/RCVD: CPT | Performed by: FAMILY MEDICINE

## 2019-03-14 PROCEDURE — 99214 OFFICE O/P EST MOD 30 MIN: CPT | Performed by: FAMILY MEDICINE

## 2019-03-14 PROCEDURE — 1101F PT FALLS ASSESS-DOCD LE1/YR: CPT | Performed by: FAMILY MEDICINE

## 2019-03-14 RX ORDER — SIMVASTATIN 20 MG
20 TABLET ORAL EVERY EVENING
Qty: 30 TABLET | Refills: 2 | Status: SHIPPED | OUTPATIENT
Start: 2019-03-14 | End: 2019-06-17 | Stop reason: SDUPTHER

## 2019-03-14 RX ORDER — ALPRAZOLAM 1 MG/1
1 TABLET ORAL 3 TIMES DAILY PRN
Qty: 100 TABLET | Refills: 2 | Status: SHIPPED | OUTPATIENT
Start: 2019-03-14 | End: 2019-04-15 | Stop reason: SDUPTHER

## 2019-03-14 RX ORDER — FAMOTIDINE 20 MG/1
20 TABLET, FILM COATED ORAL NIGHTLY PRN
Qty: 30 TABLET | Refills: 1 | Status: SHIPPED | OUTPATIENT
Start: 2019-03-14 | End: 2019-06-17 | Stop reason: SDUPTHER

## 2019-03-14 ASSESSMENT — PATIENT HEALTH QUESTIONNAIRE - PHQ9
SUM OF ALL RESPONSES TO PHQ9 QUESTIONS 1 & 2: 2
SUM OF ALL RESPONSES TO PHQ QUESTIONS 1-9: 2
2. FEELING DOWN, DEPRESSED OR HOPELESS: 1
SUM OF ALL RESPONSES TO PHQ QUESTIONS 1-9: 2
1. LITTLE INTEREST OR PLEASURE IN DOING THINGS: 1

## 2019-03-18 RX ORDER — COLCHICINE 0.6 MG/1
TABLET ORAL
Qty: 30 TABLET | Refills: 1 | Status: SHIPPED | OUTPATIENT
Start: 2019-03-18 | End: 2021-04-22

## 2019-03-18 ASSESSMENT — ENCOUNTER SYMPTOMS
EYE PAIN: 0
ABDOMINAL PAIN: 0
SHORTNESS OF BREATH: 0
WHEEZING: 0
COUGH: 0

## 2019-03-19 ENCOUNTER — TELEPHONE (OUTPATIENT)
Dept: FAMILY MEDICINE CLINIC | Age: 64
End: 2019-03-19

## 2019-03-26 ENCOUNTER — HOSPITAL ENCOUNTER (OUTPATIENT)
Dept: NON INVASIVE DIAGNOSTICS | Age: 64
Discharge: HOME | End: 2019-03-26
Payer: COMMERCIAL

## 2019-03-26 LAB
LV EF: 63 %
LVEF MODALITY: NORMAL

## 2019-03-26 PROCEDURE — 93306 TTE W/DOPPLER COMPLETE: CPT

## 2019-03-27 ENCOUNTER — TELEPHONE (OUTPATIENT)
Dept: FAMILY MEDICINE CLINIC | Age: 64
End: 2019-03-27

## 2019-04-04 ENCOUNTER — TELEPHONE (OUTPATIENT)
Dept: FAMILY MEDICINE CLINIC | Age: 64
End: 2019-04-04

## 2019-04-04 NOTE — TELEPHONE ENCOUNTER
Xanax refill  1 mg 1 tid and as needed prn # 100   OARRS attached  Pt aware Dr Ajith Keith out of office til 4-8

## 2019-04-09 ENCOUNTER — TELEPHONE (OUTPATIENT)
Dept: FAMILY MEDICINE CLINIC | Age: 64
End: 2019-04-09

## 2019-04-09 NOTE — TELEPHONE ENCOUNTER
Pt called back stating that the [employer] [place] [phone] told him to call ask him to send in the rx for his Xanax 1 mg  #100 with a refill dated for 4/14/19. Please give pt a call when complete [phone].

## 2019-04-11 DIAGNOSIS — F41.9 ANXIETY: ICD-10-CM

## 2019-04-15 RX ORDER — ALPRAZOLAM 1 MG/1
TABLET ORAL
Qty: 100 TABLET | Refills: 0 | Status: SHIPPED | OUTPATIENT
Start: 2019-04-15 | End: 2019-05-13 | Stop reason: SDUPTHER

## 2019-04-23 ENCOUNTER — TELEPHONE (OUTPATIENT)
Dept: ENDOCRINOLOGY | Age: 64
End: 2019-04-23

## 2019-04-23 NOTE — TELEPHONE ENCOUNTER
Patient called back and was upset that he has to go through all this again , patient went on about having a lawsuit against [employer].  Kept trying to explain to patient that PA was only good for one year and kept disagreeing with me after a while patient finally calmed down and said that he will wait for my call

## 2019-04-23 NOTE — TELEPHONE ENCOUNTER
Patient called and she the inusurance is giving him grief about his strips and lancets being given at the same time. And is requesting a pre auth for the true  metrix test strips. Please refill his test strips and true plus lancets. Patient is requesting that the dr put on there what it needs to say so he can get this before it runs out.

## 2019-04-23 NOTE — TELEPHONE ENCOUNTER
Patient said his main concern is to get them filled on the same day and not different days.  Its very frustrating for transportation and gas money

## 2019-04-23 NOTE — TELEPHONE ENCOUNTER
Left message for patient that PA usually are only good for only one year but I sent new request today and will call him once I hear something

## 2019-05-13 DIAGNOSIS — F41.9 ANXIETY: ICD-10-CM

## 2019-05-14 RX ORDER — ALPRAZOLAM 1 MG/1
TABLET ORAL
Qty: 100 TABLET | Refills: 0 | Status: SHIPPED | OUTPATIENT
Start: 2019-05-14 | End: 2019-06-17 | Stop reason: SDUPTHER

## 2019-06-17 ENCOUNTER — OFFICE VISIT (OUTPATIENT)
Dept: FAMILY MEDICINE CLINIC | Age: 64
End: 2019-06-17
Payer: COMMERCIAL

## 2019-06-17 VITALS
BODY MASS INDEX: 27.03 KG/M2 | WEIGHT: 188.8 LBS | DIASTOLIC BLOOD PRESSURE: 80 MMHG | SYSTOLIC BLOOD PRESSURE: 136 MMHG | HEIGHT: 70 IN

## 2019-06-17 DIAGNOSIS — Z12.11 SCREEN FOR COLON CANCER: ICD-10-CM

## 2019-06-17 DIAGNOSIS — K21.9 GASTROESOPHAGEAL REFLUX DISEASE WITHOUT ESOPHAGITIS: ICD-10-CM

## 2019-06-17 DIAGNOSIS — F41.9 ANXIETY: Primary | ICD-10-CM

## 2019-06-17 DIAGNOSIS — E78.2 MIXED HYPERLIPIDEMIA: ICD-10-CM

## 2019-06-17 PROCEDURE — G8427 DOCREV CUR MEDS BY ELIG CLIN: HCPCS | Performed by: FAMILY MEDICINE

## 2019-06-17 PROCEDURE — 4040F PNEUMOC VAC/ADMIN/RCVD: CPT | Performed by: FAMILY MEDICINE

## 2019-06-17 PROCEDURE — 99213 OFFICE O/P EST LOW 20 MIN: CPT | Performed by: FAMILY MEDICINE

## 2019-06-17 PROCEDURE — 1036F TOBACCO NON-USER: CPT | Performed by: FAMILY MEDICINE

## 2019-06-17 PROCEDURE — 1123F ACP DISCUSS/DSCN MKR DOCD: CPT | Performed by: FAMILY MEDICINE

## 2019-06-17 PROCEDURE — G8417 CALC BMI ABV UP PARAM F/U: HCPCS | Performed by: FAMILY MEDICINE

## 2019-06-17 PROCEDURE — 3017F COLORECTAL CA SCREEN DOC REV: CPT | Performed by: FAMILY MEDICINE

## 2019-06-17 RX ORDER — SIMVASTATIN 20 MG
20 TABLET ORAL EVERY EVENING
Qty: 30 TABLET | Refills: 2 | Status: SHIPPED | OUTPATIENT
Start: 2019-06-17 | End: 2019-11-18 | Stop reason: SINTOL

## 2019-06-17 RX ORDER — ALPRAZOLAM 1 MG/1
TABLET ORAL
Qty: 100 TABLET | Refills: 2 | Status: SHIPPED | OUTPATIENT
Start: 2019-06-17 | End: 2019-09-19 | Stop reason: SDUPTHER

## 2019-06-17 RX ORDER — FAMOTIDINE 20 MG/1
20 TABLET, FILM COATED ORAL NIGHTLY PRN
Qty: 30 TABLET | Refills: 2 | Status: SHIPPED | OUTPATIENT
Start: 2019-06-17 | End: 2019-12-12 | Stop reason: SDUPTHER

## 2019-06-17 NOTE — PROGRESS NOTES
Subjective:      Patient ID: Jack Jordan Sr. is a 75 y.o. male. HPI  Check up blood pressure and anxiety  Needing refills   anxiety chronic issue for years    currently relatively calm  Denies issues of chest pain or sob     meds revidewed        Review of Systems   Constitutional: Negative for appetite change, fatigue and unexpected weight change. Eyes: Negative for pain and visual disturbance. Respiratory: Negative for cough, shortness of breath and wheezing. Cardiovascular: Negative for chest pain, palpitations and leg swelling. Gastrointestinal: Negative for abdominal pain. Endocrine: Negative for polyuria. Genitourinary: Negative for difficulty urinating. Neurological: Negative for speech difficulty, numbness and headaches. Psychiatric/Behavioral: Positive for decreased concentration. Negative for confusion and sleep disturbance. The patient is nervous/anxious. A complete 14 point  review of systems was completed; pertinent positives are noted in HPI    Vitals:    06/17/19 1405   BP: 136/80   Weight: 188 lb 12.8 oz (85.6 kg)   Height: 5' 10\" (1.778 m)     Body mass index is 27.09 kg/m². Wt Readings from Last 3 Encounters:   06/17/19 188 lb 12.8 oz (85.6 kg)   03/14/19 189 lb 3.2 oz (85.8 kg)   02/21/19 187 lb 3.2 oz (84.9 kg)     BP Readings from Last 3 Encounters:   06/17/19 136/80   03/14/19 130/70   02/21/19 126/70        /80   Ht 5' 10\" (1.778 m)   Wt 188 lb 12.8 oz (85.6 kg)   BMI 27.09 kg/m²    Objective:   Physical Exam   Constitutional: He is oriented to person, place, and time. He appears well-developed. No distress. HENT:   Right Ear: External ear normal.   Left Ear: External ear normal.   Nose: Nose normal.   Mouth/Throat: Oropharynx is clear and moist.   Eyes: Conjunctivae are normal. No scleral icterus. Neck: Neck supple. No thyromegaly present. Cardiovascular: Normal rate, regular rhythm, normal heart sounds and intact distal pulses.    No murmur heard.  Pulmonary/Chest: Effort normal and breath sounds normal. No respiratory distress. Abdominal: Soft. Bowel sounds are normal. There is no tenderness. Musculoskeletal: He exhibits no edema. Lymphadenopathy:     He has no cervical adenopathy. Neurological: He is alert and oriented to person, place, and time. anxious   Skin: Skin is warm. No rash noted. Psychiatric: He has a normal mood and affect. Thought content normal.       Assessment:      Assessment/Plan:  Nickolas Abreu was seen today for 3 month follow-up and medication refill. Diagnoses and all orders for this visit:    Anxiety, well controlled wiuth current med  -     ALPRAZolam (XANAX) 1 MG tablet; TAKE ONE TABLET BY MOUTH THREE TIMES A DAY AND AS NEEDED    Mixed hyperlipidemia, controlled  -     simvastatin (ZOCOR) 20 MG tablet; Take 1 tablet by mouth every evening    Gastroesophageal reflux disease without esophagitis,s table with medsand diet restricitons  -     famotidine (PEPCID) 20 MG tablet; Take 1 tablet by mouth nightly as needed (abd pain)    Screen for colon cancer  -     POCT Fecal Immunochemical Test (FIT)            Plan:      Controlled Substance Monitoring:    Acute and Chronic Pain Monitoring:   RX Monitoring 3/14/2019   Attestation The Prescription Monitoring Report for this patient was reviewed today. Periodic Controlled Substance Monitoring No signs of potential drug abuse or diversion identified: otherwise, see note documentation   Chronic Pain > 80 MEDD Looked for signs of prescription misuse.      meds refilled   screen colon cancer  FIT  rto 3 months             Leopoldo Cradle, DO

## 2019-06-18 ASSESSMENT — PATIENT HEALTH QUESTIONNAIRE - PHQ9
2. FEELING DOWN, DEPRESSED OR HOPELESS: 0
SUM OF ALL RESPONSES TO PHQ QUESTIONS 1-9: 1
SUM OF ALL RESPONSES TO PHQ QUESTIONS 1-9: 1
SUM OF ALL RESPONSES TO PHQ9 QUESTIONS 1 & 2: 1
1. LITTLE INTEREST OR PLEASURE IN DOING THINGS: 1

## 2019-06-18 ASSESSMENT — ENCOUNTER SYMPTOMS
EYE PAIN: 0
WHEEZING: 0
SHORTNESS OF BREATH: 0
COUGH: 0
ABDOMINAL PAIN: 0

## 2019-07-03 DIAGNOSIS — Z12.11 SCREEN FOR COLON CANCER: Primary | ICD-10-CM

## 2019-07-03 LAB
CONTROL: NORMAL
HEMOCCULT STL QL: NEGATIVE

## 2019-07-03 PROCEDURE — 82274 ASSAY TEST FOR BLOOD FECAL: CPT | Performed by: FAMILY MEDICINE

## 2019-08-01 ENCOUNTER — OFFICE VISIT (OUTPATIENT)
Dept: ENDOCRINOLOGY | Age: 64
End: 2019-08-01
Payer: COMMERCIAL

## 2019-08-01 VITALS
RESPIRATION RATE: 16 BRPM | HEIGHT: 70 IN | WEIGHT: 186.6 LBS | DIASTOLIC BLOOD PRESSURE: 68 MMHG | HEART RATE: 74 BPM | BODY MASS INDEX: 26.71 KG/M2 | OXYGEN SATURATION: 95 % | SYSTOLIC BLOOD PRESSURE: 130 MMHG

## 2019-08-01 DIAGNOSIS — I10 ESSENTIAL HYPERTENSION: ICD-10-CM

## 2019-08-01 DIAGNOSIS — E11.21 TYPE 2 DIABETES MELLITUS WITH DIABETIC NEPHROPATHY, UNSPECIFIED WHETHER LONG TERM INSULIN USE (HCC): Primary | ICD-10-CM

## 2019-08-01 LAB — HBA1C MFR BLD: 6.4 %

## 2019-08-01 PROCEDURE — 2022F DILAT RTA XM EVC RTNOPTHY: CPT | Performed by: INTERNAL MEDICINE

## 2019-08-01 PROCEDURE — G8427 DOCREV CUR MEDS BY ELIG CLIN: HCPCS | Performed by: INTERNAL MEDICINE

## 2019-08-01 PROCEDURE — 1036F TOBACCO NON-USER: CPT | Performed by: INTERNAL MEDICINE

## 2019-08-01 PROCEDURE — 3017F COLORECTAL CA SCREEN DOC REV: CPT | Performed by: INTERNAL MEDICINE

## 2019-08-01 PROCEDURE — 99213 OFFICE O/P EST LOW 20 MIN: CPT | Performed by: INTERNAL MEDICINE

## 2019-08-01 PROCEDURE — 3044F HG A1C LEVEL LT 7.0%: CPT | Performed by: INTERNAL MEDICINE

## 2019-08-01 PROCEDURE — 4040F PNEUMOC VAC/ADMIN/RCVD: CPT | Performed by: INTERNAL MEDICINE

## 2019-08-01 PROCEDURE — G8417 CALC BMI ABV UP PARAM F/U: HCPCS | Performed by: INTERNAL MEDICINE

## 2019-08-01 PROCEDURE — 83036 HEMOGLOBIN GLYCOSYLATED A1C: CPT | Performed by: INTERNAL MEDICINE

## 2019-08-01 PROCEDURE — 1123F ACP DISCUSS/DSCN MKR DOCD: CPT | Performed by: INTERNAL MEDICINE

## 2019-08-01 RX ORDER — GLUCOSAM/CHON-MSM1/C/MANG/BOSW 500-416.6
TABLET ORAL
Qty: 100 EACH | Refills: 5 | Status: SHIPPED | OUTPATIENT
Start: 2019-08-01 | End: 2020-02-06 | Stop reason: SDUPTHER

## 2019-08-01 NOTE — PROGRESS NOTES
(COLCRYS) 0.6 MG tablet Take 1 tab po every 3 days 30 tablet 1    blood glucose test strips (TRUE METRIX BLOOD GLUCOSE TEST) strip USE TO TEST FOUR TIMES A DAY AS NEEDED 720 strip 5    TRUEPLUS LANCETS 33G MISC USE FOUR TIMES A  each 5    oxyCODONE (ROXICODONE) 5 MG immediate release tablet Take 5 mg by mouth every 4 hours as needed for Pain .  Blood Glucose Monitoring Suppl (TRUE METRIX METER) W/DEVICE KIT 1 each by Does not apply route 4 times daily 1 kit 0    aspirin 325 MG EC tablet Take 325 mg by mouth daily      febuxostat (ULORIC) 40 MG TABS tablet Take 1 tablet by mouth daily (Patient not taking: Reported on 8/1/2019) 90 tablet 1     No current facility-administered medications for this visit. Review of Systems  Scanned, reviewed       Objective:        /68 (Site: Left Upper Arm, Position: Sitting, Cuff Size: Medium Adult)   Pulse 74   Resp 16   Ht 5' 10\" (1.778 m)   Wt 186 lb 9.6 oz (84.6 kg)   SpO2 95%   BMI 26.77 kg/m²   Wt Readings from Last 3 Encounters:   08/01/19 186 lb 9.6 oz (84.6 kg)   06/17/19 188 lb 12.8 oz (85.6 kg)   03/14/19 189 lb 3.2 oz (85.8 kg)     Constitutional: Well-developed, appears stated age, cooperative, in no acute distress  H/E/N/M/T:atraumatic, normocephalic, external ears, nose, lips normal without lesions  Eyes: Lids, lashes, conjunctivae and sclerae normal, No proptosis, no redness  Neck: supple, symmetrical, no swelling  Skin: No obvious rashes or lesions present.   Skin and hair texture normal  Psychiatric: Judgement and Insight:  judgement and insight appear normal  Neuro: Normal without focal findings, speech is normal normal, speech is spontaneous  Chest: No labored breathing, no chest deformity, no stridor  Musculoskeletal: No joint deformity, swelling      8/18  Skeletal foot exam is normal, no skin lesions, toenails are normal, 10 g monofilament is detected , DP palpable, Callus+      Lab Reviewed   No components found for:

## 2019-08-08 ENCOUNTER — OFFICE VISIT (OUTPATIENT)
Dept: RHEUMATOLOGY | Age: 64
End: 2019-08-08
Payer: COMMERCIAL

## 2019-08-08 VITALS
HEIGHT: 70 IN | DIASTOLIC BLOOD PRESSURE: 78 MMHG | BODY MASS INDEX: 26.7 KG/M2 | WEIGHT: 186.51 LBS | SYSTOLIC BLOOD PRESSURE: 120 MMHG

## 2019-08-08 DIAGNOSIS — M15.9 GENERALIZED OSTEOARTHRITIS: ICD-10-CM

## 2019-08-08 DIAGNOSIS — M1A.9XX0 CHRONIC GOUT WITHOUT TOPHUS, UNSPECIFIED CAUSE, UNSPECIFIED SITE: Primary | ICD-10-CM

## 2019-08-08 DIAGNOSIS — M1A.9XX0 CHRONIC GOUT WITHOUT TOPHUS, UNSPECIFIED CAUSE, UNSPECIFIED SITE: ICD-10-CM

## 2019-08-08 LAB
A/G RATIO: 2 (ref 1.1–2.2)
ALBUMIN SERPL-MCNC: 4.6 G/DL (ref 3.4–5)
ALP BLD-CCNC: 57 U/L (ref 40–129)
ALT SERPL-CCNC: 21 U/L (ref 10–40)
ANION GAP SERPL CALCULATED.3IONS-SCNC: 14 MMOL/L (ref 3–16)
AST SERPL-CCNC: 22 U/L (ref 15–37)
BASOPHILS ABSOLUTE: 0 K/UL (ref 0–0.2)
BASOPHILS RELATIVE PERCENT: 0.5 %
BILIRUB SERPL-MCNC: 1.1 MG/DL (ref 0–1)
BUN BLDV-MCNC: 33 MG/DL (ref 7–20)
CALCIUM SERPL-MCNC: 9.5 MG/DL (ref 8.3–10.6)
CHLORIDE BLD-SCNC: 104 MMOL/L (ref 99–110)
CO2: 24 MMOL/L (ref 21–32)
CREAT SERPL-MCNC: 1.5 MG/DL (ref 0.8–1.3)
EOSINOPHILS ABSOLUTE: 0.4 K/UL (ref 0–0.6)
EOSINOPHILS RELATIVE PERCENT: 5.8 %
GFR AFRICAN AMERICAN: 57
GFR NON-AFRICAN AMERICAN: 47
GLOBULIN: 2.3 G/DL
GLUCOSE BLD-MCNC: 253 MG/DL (ref 70–99)
HCT VFR BLD CALC: 43.1 % (ref 40.5–52.5)
HEMOGLOBIN: 14.5 G/DL (ref 13.5–17.5)
LYMPHOCYTES ABSOLUTE: 2 K/UL (ref 1–5.1)
LYMPHOCYTES RELATIVE PERCENT: 31.5 %
MCH RBC QN AUTO: 31.3 PG (ref 26–34)
MCHC RBC AUTO-ENTMCNC: 33.7 G/DL (ref 31–36)
MCV RBC AUTO: 93 FL (ref 80–100)
MONOCYTES ABSOLUTE: 0.5 K/UL (ref 0–1.3)
MONOCYTES RELATIVE PERCENT: 8.1 %
NEUTROPHILS ABSOLUTE: 3.5 K/UL (ref 1.7–7.7)
NEUTROPHILS RELATIVE PERCENT: 54.1 %
PDW BLD-RTO: 12.8 % (ref 12.4–15.4)
PLATELET # BLD: 163 K/UL (ref 135–450)
PMV BLD AUTO: 9 FL (ref 5–10.5)
POTASSIUM SERPL-SCNC: 4.4 MMOL/L (ref 3.5–5.1)
RBC # BLD: 4.64 M/UL (ref 4.2–5.9)
SODIUM BLD-SCNC: 142 MMOL/L (ref 136–145)
TOTAL PROTEIN: 6.9 G/DL (ref 6.4–8.2)
URIC ACID, SERUM: 3.6 MG/DL (ref 3.5–7.2)
WBC # BLD: 6.5 K/UL (ref 4–11)

## 2019-08-08 PROCEDURE — 99214 OFFICE O/P EST MOD 30 MIN: CPT | Performed by: INTERNAL MEDICINE

## 2019-08-08 PROCEDURE — G8417 CALC BMI ABV UP PARAM F/U: HCPCS | Performed by: INTERNAL MEDICINE

## 2019-08-08 PROCEDURE — G8427 DOCREV CUR MEDS BY ELIG CLIN: HCPCS | Performed by: INTERNAL MEDICINE

## 2019-08-08 PROCEDURE — 4040F PNEUMOC VAC/ADMIN/RCVD: CPT | Performed by: INTERNAL MEDICINE

## 2019-08-08 PROCEDURE — 1123F ACP DISCUSS/DSCN MKR DOCD: CPT | Performed by: INTERNAL MEDICINE

## 2019-08-08 PROCEDURE — 3017F COLORECTAL CA SCREEN DOC REV: CPT | Performed by: INTERNAL MEDICINE

## 2019-08-08 PROCEDURE — 1036F TOBACCO NON-USER: CPT | Performed by: INTERNAL MEDICINE

## 2019-08-08 RX ORDER — FEBUXOSTAT 40 MG/1
40 TABLET, FILM COATED ORAL DAILY
Qty: 90 TABLET | Refills: 3 | Status: SHIPPED | OUTPATIENT
Start: 2019-08-08 | End: 2019-10-18 | Stop reason: SDUPTHER

## 2019-08-08 NOTE — PROGRESS NOTES
been taking colchicine 1 tablet 2 times a week without any rationale. All other review of systems are negative. Past Medical History:   Diagnosis Date    Anxiety     HTN (hypertension)     Hyperlipemia     Panic disorder     Type II or unspecified type diabetes mellitus without mention of complication, not stated as uncontrolled     Umbilical hernia      Past Surgical History:   Procedure Laterality Date    CHOLECYSTECTOMY, LAPAROSCOPIC  2016    with gram    NASAL POLYP SURGERY      SINUS SURGERY      TONSILLECTOMY      UMBILICAL HERNIA REPAIR  2016     Person, family history reviewed and updated. Current Outpatient Medications   Medication Sig Dispense Refill    febuxostat (ULORIC) 40 MG TABS tablet Take 1 tablet by mouth daily 90 tablet 3    blood glucose test strips (TRUE METRIX BLOOD GLUCOSE TEST) strip USE TO TEST FOUR TIMES A DAY AS NEEDED 100 strip 5    TRUEPLUS LANCETS 33G MISC USE FOUR TIMES A  each 5    simvastatin (ZOCOR) 20 MG tablet Take 1 tablet by mouth every evening 30 tablet 2    famotidine (PEPCID) 20 MG tablet Take 1 tablet by mouth nightly as needed (abd pain) 30 tablet 2    colchicine (COLCRYS) 0.6 MG tablet Take 1 tab po every 3 days 30 tablet 1    febuxostat (ULORIC) 40 MG TABS tablet Take 1 tablet by mouth daily 90 tablet 1    oxyCODONE (ROXICODONE) 5 MG immediate release tablet Take 5 mg by mouth every 4 hours as needed for Pain .  Blood Glucose Monitoring Suppl (TRUE METRIX METER) W/DEVICE KIT 1 each by Does not apply route 4 times daily 1 kit 0    aspirin 325 MG EC tablet Take 325 mg by mouth daily       No current facility-administered medications for this visit. Allergies   Allergen Reactions    Cephalexin     Levofloxacin     Penicillins     Succinylcholine      Does not want. Pt son had reaction to Succs and almost , pt was told not to ever take Succs.     Sulfa Antibiotics     Tetracyclines & Related     Verapamil  Vicodin [Hydrocodone-Acetaminophen]      Rash; ithching         OBJECTIVE  Physical    Vitals:    08/08/19 1430   BP: 120/78       General appearance/psychiatric: Well nourished and well groomed, normal judgment. Alert, appears stated age and cooperative. MKS: Other than mild limitation in hip abduction bilaterally, and osteoarthritic changes across his DIPs and CMC's, has normal musculoskeletal examination and upper, lower extremities. No tophi  Skin- no rashes, induration or digital ischemia. Data:  Lab Results   Component Value Date    WBC 7.1 12/18/2018    RBC 4.66 12/18/2018    HGB 14.8 12/18/2018    HCT 43.7 12/18/2018     12/18/2018    MCV 93.8 12/18/2018    MCH 31.8 12/18/2018    MCHC 34.0 12/18/2018    RDW 12.8 12/18/2018    LYMPHOPCT 34.9 09/20/2018    MONOPCT 9.4 09/20/2018    BASOPCT 0.5 09/20/2018    MONOSABS 0.6 09/20/2018    LYMPHSABS 2.3 09/20/2018    EOSABS 0.3 09/20/2018    BASOSABS 0.0 09/20/2018       Chemistry        Component Value Date/Time     02/07/2019 1509    K 4.5 02/07/2019 1509     02/07/2019 1509    CO2 25 02/07/2019 1509    BUN 30 (H) 02/07/2019 1509    CREATININE 1.3 02/07/2019 1509        Component Value Date/Time    CALCIUM 9.4 02/07/2019 1509    ALKPHOS 59 02/07/2019 1509    AST 21 02/07/2019 1509    ALT 22 02/07/2019 1509    BILITOT 0.9 02/07/2019 1509          Lab Results   Component Value Date    SEDRATE 14 08/18/2016     MRI knee-9 s2016-mild tricompartmental osteoarthritis. I thank you for giving me the opportunity to be involved in [address] [place] [address] and I look forward following Ken Nash along with you. If you have any questions or concerns please feel free to contact me at any time.     Toni Guillen MD 08/08/19

## 2019-09-19 ENCOUNTER — OFFICE VISIT (OUTPATIENT)
Dept: FAMILY MEDICINE CLINIC | Age: 64
End: 2019-09-19
Payer: COMMERCIAL

## 2019-09-19 VITALS
WEIGHT: 186 LBS | BODY MASS INDEX: 26.63 KG/M2 | SYSTOLIC BLOOD PRESSURE: 130 MMHG | DIASTOLIC BLOOD PRESSURE: 70 MMHG | HEIGHT: 70 IN

## 2019-09-19 DIAGNOSIS — F41.9 ANXIETY: Primary | ICD-10-CM

## 2019-09-19 DIAGNOSIS — J45.20 MILD INTERMITTENT ASTHMATIC BRONCHITIS WITHOUT COMPLICATION: ICD-10-CM

## 2019-09-19 PROCEDURE — G8427 DOCREV CUR MEDS BY ELIG CLIN: HCPCS | Performed by: FAMILY MEDICINE

## 2019-09-19 PROCEDURE — 1036F TOBACCO NON-USER: CPT | Performed by: FAMILY MEDICINE

## 2019-09-19 PROCEDURE — 99213 OFFICE O/P EST LOW 20 MIN: CPT | Performed by: FAMILY MEDICINE

## 2019-09-19 PROCEDURE — G8417 CALC BMI ABV UP PARAM F/U: HCPCS | Performed by: FAMILY MEDICINE

## 2019-09-19 PROCEDURE — 1123F ACP DISCUSS/DSCN MKR DOCD: CPT | Performed by: FAMILY MEDICINE

## 2019-09-19 PROCEDURE — 4040F PNEUMOC VAC/ADMIN/RCVD: CPT | Performed by: FAMILY MEDICINE

## 2019-09-19 PROCEDURE — 3017F COLORECTAL CA SCREEN DOC REV: CPT | Performed by: FAMILY MEDICINE

## 2019-09-19 RX ORDER — FEBUXOSTAT 40 MG/1
40 TABLET, FILM COATED ORAL DAILY
Qty: 90 TABLET | Refills: 3 | Status: CANCELLED | OUTPATIENT
Start: 2019-09-19

## 2019-09-19 RX ORDER — AZITHROMYCIN 250 MG/1
TABLET, FILM COATED ORAL
Qty: 1 PACKET | Refills: 0 | Status: SHIPPED | OUTPATIENT
Start: 2019-09-19 | End: 2019-10-01 | Stop reason: SDUPTHER

## 2019-09-19 RX ORDER — ALPRAZOLAM 1 MG/1
TABLET ORAL
Qty: 100 TABLET | Refills: 2 | Status: SHIPPED | OUTPATIENT
Start: 2019-09-19 | End: 2019-10-18 | Stop reason: SDUPTHER

## 2019-09-19 ASSESSMENT — ENCOUNTER SYMPTOMS
COUGH: 1
WHEEZING: 1

## 2019-09-19 NOTE — PROGRESS NOTES
Subjective:      Patient ID: Celsa Sheffield Sr. is a 75 y.o. male. HPI  Check up   med refills   chronic anxiety    Recent flare up cough congestion purulent sputum  aches and pain  Chilled  Low grade fever  Onset last week      Hemoglobin A1C (%)   Date Value   08/01/2019 6.4     Microalbumin, Random Urine (mg/dL)   Date Value   09/20/2018 <1.20     LDL Calculated (mg/dL)   Date Value   12/18/2018 103 (H)                  Review of Systems   Constitutional: Positive for fatigue and fever. HENT: Positive for congestion. Respiratory: Positive for cough and wheezing. Cardiovascular: Negative for chest pain. Genitourinary: Negative for difficulty urinating. Neurological: Positive for weakness. Psychiatric/Behavioral: Positive for decreased concentration. Negative for sleep disturbance. The patient is nervous/anxious. A complete 14 point  review of systems was completed; pertinent positives are noted in HPI    Vitals:    09/19/19 1415   BP: 130/70   Weight: 186 lb (84.4 kg)   Height: 5' 10\" (1.778 m)     Body mass index is 26.69 kg/m². Wt Readings from Last 3 Encounters:   09/19/19 186 lb (84.4 kg)   08/08/19 186 lb 8.2 oz (84.6 kg)   08/01/19 186 lb 9.6 oz (84.6 kg)     BP Readings from Last 3 Encounters:   09/19/19 130/70   08/08/19 120/78   08/01/19 130/68        /70   Ht 5' 10\" (1.778 m)   Wt 186 lb (84.4 kg)   BMI 26.69 kg/m²    Objective:   Physical Exam   Constitutional: He is oriented to person, place, and time. He appears well-nourished. Anxious     HENT:   Mouth/Throat: Oropharynx is clear and moist.   Eyes: Conjunctivae are normal.   Neck: Neck supple. No thyromegaly present. Cardiovascular: Normal rate and normal heart sounds. Pulmonary/Chest: Effort normal. No respiratory distress. He has wheezes. Prolonged exp phase with scattered rhonchi bilat   Abdominal: Soft. Lymphadenopathy:     He has no cervical adenopathy.    Neurological: He is alert and oriented to person, place, and time. Assessment:        Assessment/Plan:  Fallon Landry was seen today for anxiety and 3 month follow-up. Diagnoses and all orders for this visit:    Anxiety  -     ALPRAZolam (XANAX) 1 MG tablet; TAKE ONE TABLET BY MOUTH THREE TIMES A DAY AND AS NEEDED    Mild intermittent asthmatic bronchitis without complication  -     azithromycin (ZITHROMAX) 250 MG tablet; Take 2 tabs (500 mg) on Day 1, and take 1 tab (250 mg) on days 2 through 5. Plan:      Controlled Substance Monitoring:    Acute and Chronic Pain Monitoring:   RX Monitoring 9/19/2019   Attestation -   Periodic Controlled Substance Monitoring Possible medication side effects, risk of tolerance/dependence & alternative treatments discussed. ;No signs of potential drug abuse or diversion identified. ;Assessed functional status. ;Obtaining appropriate analgesic effect of treatment.    Chronic Pain > 80 MEDD -     Cont current meds   refills done   rto 3 months             [address] [facility], DO

## 2019-09-23 ASSESSMENT — PATIENT HEALTH QUESTIONNAIRE - PHQ9
SUM OF ALL RESPONSES TO PHQ QUESTIONS 1-9: 1
SUM OF ALL RESPONSES TO PHQ QUESTIONS 1-9: 1
SUM OF ALL RESPONSES TO PHQ9 QUESTIONS 1 & 2: 1
1. LITTLE INTEREST OR PLEASURE IN DOING THINGS: 0
2. FEELING DOWN, DEPRESSED OR HOPELESS: 1

## 2019-09-27 ENCOUNTER — TELEPHONE (OUTPATIENT)
Dept: FAMILY MEDICINE CLINIC | Age: 64
End: 2019-09-27

## 2019-09-30 ENCOUNTER — TELEPHONE (OUTPATIENT)
Dept: FAMILY MEDICINE CLINIC | Age: 64
End: 2019-09-30

## 2019-10-01 DIAGNOSIS — J45.20 MILD INTERMITTENT ASTHMATIC BRONCHITIS WITHOUT COMPLICATION: ICD-10-CM

## 2019-10-01 RX ORDER — AZITHROMYCIN 250 MG/1
TABLET, FILM COATED ORAL
Qty: 1 PACKET | Refills: 0 | Status: SHIPPED | OUTPATIENT
Start: 2019-10-01 | End: 2019-11-18 | Stop reason: ALTCHOICE

## 2019-10-18 ENCOUNTER — TELEPHONE (OUTPATIENT)
Dept: RHEUMATOLOGY | Age: 64
End: 2019-10-18

## 2019-10-18 ENCOUNTER — TELEPHONE (OUTPATIENT)
Dept: FAMILY MEDICINE CLINIC | Age: 64
End: 2019-10-18

## 2019-10-18 DIAGNOSIS — E11.21 TYPE 2 DIABETES MELLITUS WITH DIABETIC NEPHROPATHY, UNSPECIFIED WHETHER LONG TERM INSULIN USE (HCC): Primary | ICD-10-CM

## 2019-10-18 RX ORDER — FEBUXOSTAT 40 MG/1
40 TABLET, FILM COATED ORAL DAILY
Qty: 90 TABLET | Refills: 3 | Status: SHIPPED | OUTPATIENT
Start: 2019-10-18 | End: 2019-11-08 | Stop reason: SDUPTHER

## 2019-10-21 ENCOUNTER — TELEPHONE (OUTPATIENT)
Dept: FAMILY MEDICINE CLINIC | Age: 64
End: 2019-10-21

## 2019-10-21 DIAGNOSIS — E11.21 TYPE 2 DIABETES MELLITUS WITH DIABETIC NEPHROPATHY, UNSPECIFIED WHETHER LONG TERM INSULIN USE (HCC): Primary | ICD-10-CM

## 2019-10-22 DIAGNOSIS — E11.21 TYPE 2 DIABETES MELLITUS WITH DIABETIC NEPHROPATHY, UNSPECIFIED WHETHER LONG TERM INSULIN USE (HCC): ICD-10-CM

## 2019-10-22 LAB
CREATININE URINE: 68.4 MG/DL (ref 39–259)
MICROALBUMIN UR-MCNC: <1.2 MG/DL
MICROALBUMIN/CREAT UR-RTO: NORMAL MG/G (ref 0–30)

## 2019-10-23 ENCOUNTER — TELEPHONE (OUTPATIENT)
Dept: FAMILY MEDICINE CLINIC | Age: 64
End: 2019-10-23

## 2019-10-30 ENCOUNTER — OFFICE VISIT (OUTPATIENT)
Dept: RHEUMATOLOGY | Age: 64
End: 2019-10-30
Payer: COMMERCIAL

## 2019-10-30 VITALS
SYSTOLIC BLOOD PRESSURE: 120 MMHG | BODY MASS INDEX: 26.48 KG/M2 | DIASTOLIC BLOOD PRESSURE: 78 MMHG | WEIGHT: 185 LBS | HEIGHT: 70 IN

## 2019-10-30 DIAGNOSIS — M1A.9XX0 CHRONIC GOUT WITHOUT TOPHUS, UNSPECIFIED CAUSE, UNSPECIFIED SITE: Primary | ICD-10-CM

## 2019-10-30 DIAGNOSIS — M15.9 GENERALIZED OSTEOARTHRITIS: ICD-10-CM

## 2019-10-30 PROCEDURE — 1123F ACP DISCUSS/DSCN MKR DOCD: CPT | Performed by: INTERNAL MEDICINE

## 2019-10-30 PROCEDURE — G8417 CALC BMI ABV UP PARAM F/U: HCPCS | Performed by: INTERNAL MEDICINE

## 2019-10-30 PROCEDURE — 99213 OFFICE O/P EST LOW 20 MIN: CPT | Performed by: INTERNAL MEDICINE

## 2019-10-30 PROCEDURE — 4040F PNEUMOC VAC/ADMIN/RCVD: CPT | Performed by: INTERNAL MEDICINE

## 2019-10-30 PROCEDURE — 1036F TOBACCO NON-USER: CPT | Performed by: INTERNAL MEDICINE

## 2019-10-30 PROCEDURE — G8427 DOCREV CUR MEDS BY ELIG CLIN: HCPCS | Performed by: INTERNAL MEDICINE

## 2019-10-30 PROCEDURE — 3017F COLORECTAL CA SCREEN DOC REV: CPT | Performed by: INTERNAL MEDICINE

## 2019-10-30 PROCEDURE — G8484 FLU IMMUNIZE NO ADMIN: HCPCS | Performed by: INTERNAL MEDICINE

## 2019-11-08 DIAGNOSIS — E78.2 MIXED HYPERLIPIDEMIA: ICD-10-CM

## 2019-11-08 RX ORDER — FEBUXOSTAT 40 MG/1
40 TABLET ORAL DAILY
Qty: 90 TABLET | Refills: 3 | Status: SHIPPED
Start: 2019-11-08 | End: 2020-09-24 | Stop reason: SDUPTHER

## 2019-11-18 DIAGNOSIS — E78.2 MIXED HYPERLIPIDEMIA: Primary | ICD-10-CM

## 2019-11-22 ENCOUNTER — TELEPHONE (OUTPATIENT)
Dept: FAMILY MEDICINE CLINIC | Age: 64
End: 2019-11-22

## 2019-11-25 ENCOUNTER — TELEPHONE (OUTPATIENT)
Dept: FAMILY MEDICINE CLINIC | Age: 64
End: 2019-11-25

## 2019-11-26 RX ORDER — ATORVASTATIN CALCIUM 40 MG/1
40 TABLET, FILM COATED ORAL DAILY
Qty: 30 TABLET | Refills: 3 | Status: SHIPPED | OUTPATIENT
Start: 2019-11-26 | End: 2020-04-01

## 2019-11-26 NOTE — TELEPHONE ENCOUNTER
There is a letter under the Media tab from [facility] VALERIE MAGALYS dated 11/24/19 denying an appeal for Repatha. That letter states the following: According to the letter the next step would be to   request a Medicaid State Hearing. The letter contains   further details if you wish to pursue this option. Also, this letter was addressed to the patient, so he   should have a copy of the decision.

## 2019-11-26 NOTE — TELEPHONE ENCOUNTER
The medication was denied and the only way it might be covered is doing the next step which is a state hearing. Do you want to pursue this option for the patient?

## 2019-11-27 NOTE — TELEPHONE ENCOUNTER
Pt notiifed of message  Should he be taking something? Pt states he has been off zocar for 3 months  He will call to make an appt with cardiologist to discuss medication for cholesterol   In the meantime should he start taking meds again?

## 2019-12-12 ENCOUNTER — OFFICE VISIT (OUTPATIENT)
Dept: FAMILY MEDICINE CLINIC | Age: 64
End: 2019-12-12
Payer: COMMERCIAL

## 2019-12-12 VITALS
DIASTOLIC BLOOD PRESSURE: 70 MMHG | SYSTOLIC BLOOD PRESSURE: 134 MMHG | HEIGHT: 70 IN | BODY MASS INDEX: 26.28 KG/M2 | WEIGHT: 183.6 LBS

## 2019-12-12 DIAGNOSIS — F41.9 ANXIETY: ICD-10-CM

## 2019-12-12 DIAGNOSIS — Z23 NEED FOR PNEUMOCOCCAL VACCINATION: ICD-10-CM

## 2019-12-12 DIAGNOSIS — E11.21 TYPE 2 DIABETES MELLITUS WITH DIABETIC NEPHROPATHY, UNSPECIFIED WHETHER LONG TERM INSULIN USE (HCC): ICD-10-CM

## 2019-12-12 DIAGNOSIS — I10 ESSENTIAL HYPERTENSION: Primary | ICD-10-CM

## 2019-12-12 DIAGNOSIS — Z23 NEEDS FLU SHOT: ICD-10-CM

## 2019-12-12 DIAGNOSIS — K21.9 GASTROESOPHAGEAL REFLUX DISEASE WITHOUT ESOPHAGITIS: ICD-10-CM

## 2019-12-12 DIAGNOSIS — E78.2 MIXED HYPERLIPIDEMIA: ICD-10-CM

## 2019-12-12 PROCEDURE — 99214 OFFICE O/P EST MOD 30 MIN: CPT | Performed by: FAMILY MEDICINE

## 2019-12-12 PROCEDURE — 90653 IIV ADJUVANT VACCINE IM: CPT | Performed by: FAMILY MEDICINE

## 2019-12-12 PROCEDURE — 3017F COLORECTAL CA SCREEN DOC REV: CPT | Performed by: FAMILY MEDICINE

## 2019-12-12 PROCEDURE — 1036F TOBACCO NON-USER: CPT | Performed by: FAMILY MEDICINE

## 2019-12-12 PROCEDURE — 3044F HG A1C LEVEL LT 7.0%: CPT | Performed by: FAMILY MEDICINE

## 2019-12-12 PROCEDURE — 2022F DILAT RTA XM EVC RTNOPTHY: CPT | Performed by: FAMILY MEDICINE

## 2019-12-12 PROCEDURE — 90471 IMMUNIZATION ADMIN: CPT | Performed by: FAMILY MEDICINE

## 2019-12-12 PROCEDURE — 4040F PNEUMOC VAC/ADMIN/RCVD: CPT | Performed by: FAMILY MEDICINE

## 2019-12-12 PROCEDURE — G8428 CUR MEDS NOT DOCUMENT: HCPCS | Performed by: FAMILY MEDICINE

## 2019-12-12 PROCEDURE — 1123F ACP DISCUSS/DSCN MKR DOCD: CPT | Performed by: FAMILY MEDICINE

## 2019-12-12 PROCEDURE — 90732 PPSV23 VACC 2 YRS+ SUBQ/IM: CPT | Performed by: FAMILY MEDICINE

## 2019-12-12 PROCEDURE — G8482 FLU IMMUNIZE ORDER/ADMIN: HCPCS | Performed by: FAMILY MEDICINE

## 2019-12-12 PROCEDURE — 90472 IMMUNIZATION ADMIN EACH ADD: CPT | Performed by: FAMILY MEDICINE

## 2019-12-12 PROCEDURE — G8417 CALC BMI ABV UP PARAM F/U: HCPCS | Performed by: FAMILY MEDICINE

## 2019-12-12 RX ORDER — ALPRAZOLAM 1 MG/1
TABLET ORAL
Qty: 100 TABLET | Refills: 2 | Status: SHIPPED | OUTPATIENT
Start: 2019-12-12 | End: 2020-03-19 | Stop reason: SDUPTHER

## 2019-12-12 RX ORDER — FAMOTIDINE 20 MG/1
20 TABLET, FILM COATED ORAL NIGHTLY PRN
Qty: 30 TABLET | Refills: 2 | Status: SHIPPED | OUTPATIENT
Start: 2019-12-12 | End: 2020-03-19 | Stop reason: SDUPTHER

## 2019-12-12 ASSESSMENT — ENCOUNTER SYMPTOMS
EYE PAIN: 0
SHORTNESS OF BREATH: 0
COUGH: 0
WHEEZING: 0
ABDOMINAL PAIN: 0

## 2019-12-12 ASSESSMENT — PATIENT HEALTH QUESTIONNAIRE - PHQ9
1. LITTLE INTEREST OR PLEASURE IN DOING THINGS: 1
SUM OF ALL RESPONSES TO PHQ QUESTIONS 1-9: 1
2. FEELING DOWN, DEPRESSED OR HOPELESS: 0
SUM OF ALL RESPONSES TO PHQ QUESTIONS 1-9: 1
SUM OF ALL RESPONSES TO PHQ9 QUESTIONS 1 & 2: 1

## 2019-12-13 LAB
A/G RATIO: 1.6 (ref 1.1–2.2)
ALBUMIN SERPL-MCNC: 4.4 G/DL (ref 3.4–5)
ALP BLD-CCNC: 62 U/L (ref 40–129)
ALT SERPL-CCNC: 18 U/L (ref 10–40)
ANION GAP SERPL CALCULATED.3IONS-SCNC: 13 MMOL/L (ref 3–16)
AST SERPL-CCNC: 23 U/L (ref 15–37)
BILIRUB SERPL-MCNC: 1.4 MG/DL (ref 0–1)
BUN BLDV-MCNC: 31 MG/DL (ref 7–20)
CALCIUM SERPL-MCNC: 9.7 MG/DL (ref 8.3–10.6)
CHLORIDE BLD-SCNC: 104 MMOL/L (ref 99–110)
CHOLESTEROL, TOTAL: 112 MG/DL (ref 0–199)
CO2: 25 MMOL/L (ref 21–32)
CREAT SERPL-MCNC: 1.3 MG/DL (ref 0.8–1.3)
GFR AFRICAN AMERICAN: >60
GFR NON-AFRICAN AMERICAN: 55
GLOBULIN: 2.8 G/DL
GLUCOSE BLD-MCNC: 113 MG/DL (ref 70–99)
HDLC SERPL-MCNC: 42 MG/DL (ref 40–60)
LDL CHOLESTEROL CALCULATED: 57 MG/DL
POTASSIUM SERPL-SCNC: 4.5 MMOL/L (ref 3.5–5.1)
SODIUM BLD-SCNC: 142 MMOL/L (ref 136–145)
TOTAL PROTEIN: 7.2 G/DL (ref 6.4–8.2)
TRIGL SERPL-MCNC: 67 MG/DL (ref 0–150)
TSH SERPL DL<=0.05 MIU/L-ACNC: 1.89 UIU/ML (ref 0.27–4.2)
VLDLC SERPL CALC-MCNC: 13 MG/DL

## 2019-12-23 ENCOUNTER — TELEPHONE (OUTPATIENT)
Dept: FAMILY MEDICINE CLINIC | Age: 64
End: 2019-12-23

## 2020-01-27 ENCOUNTER — TELEPHONE (OUTPATIENT)
Dept: FAMILY MEDICINE CLINIC | Age: 65
End: 2020-01-27

## 2020-01-27 NOTE — TELEPHONE ENCOUNTER
Patient going on and on about why he was ever put on \"Statins\" and the Lipitor he was put on is causing muscle aches. Patient doesn't know if he should refill this medication or not. Patient requesting a call back. Patient hoping Dr. Jose Ramires could address this message.

## 2020-02-06 ENCOUNTER — OFFICE VISIT (OUTPATIENT)
Dept: ENDOCRINOLOGY | Age: 65
End: 2020-02-06
Payer: COMMERCIAL

## 2020-02-06 VITALS
DIASTOLIC BLOOD PRESSURE: 62 MMHG | WEIGHT: 182 LBS | RESPIRATION RATE: 14 BRPM | OXYGEN SATURATION: 99 % | HEART RATE: 75 BPM | SYSTOLIC BLOOD PRESSURE: 132 MMHG | BODY MASS INDEX: 26.05 KG/M2 | HEIGHT: 70 IN

## 2020-02-06 LAB — HBA1C MFR BLD: 6.6 %

## 2020-02-06 PROCEDURE — G8417 CALC BMI ABV UP PARAM F/U: HCPCS | Performed by: INTERNAL MEDICINE

## 2020-02-06 PROCEDURE — 3044F HG A1C LEVEL LT 7.0%: CPT | Performed by: INTERNAL MEDICINE

## 2020-02-06 PROCEDURE — 2022F DILAT RTA XM EVC RTNOPTHY: CPT | Performed by: INTERNAL MEDICINE

## 2020-02-06 PROCEDURE — 1036F TOBACCO NON-USER: CPT | Performed by: INTERNAL MEDICINE

## 2020-02-06 PROCEDURE — 3017F COLORECTAL CA SCREEN DOC REV: CPT | Performed by: INTERNAL MEDICINE

## 2020-02-06 PROCEDURE — 83036 HEMOGLOBIN GLYCOSYLATED A1C: CPT | Performed by: INTERNAL MEDICINE

## 2020-02-06 PROCEDURE — 4040F PNEUMOC VAC/ADMIN/RCVD: CPT | Performed by: INTERNAL MEDICINE

## 2020-02-06 PROCEDURE — G8482 FLU IMMUNIZE ORDER/ADMIN: HCPCS | Performed by: INTERNAL MEDICINE

## 2020-02-06 PROCEDURE — 99213 OFFICE O/P EST LOW 20 MIN: CPT | Performed by: INTERNAL MEDICINE

## 2020-02-06 PROCEDURE — G8427 DOCREV CUR MEDS BY ELIG CLIN: HCPCS | Performed by: INTERNAL MEDICINE

## 2020-02-06 PROCEDURE — 1123F ACP DISCUSS/DSCN MKR DOCD: CPT | Performed by: INTERNAL MEDICINE

## 2020-02-06 RX ORDER — GLUCOSAM/CHON-MSM1/C/MANG/BOSW 500-416.6
TABLET ORAL
Qty: 100 EACH | Refills: 5 | Status: SHIPPED | OUTPATIENT
Start: 2020-02-06 | End: 2020-08-06 | Stop reason: SDUPTHER

## 2020-02-06 NOTE — PROGRESS NOTES
(PEPCID) 20 MG tablet Take 1 tablet by mouth nightly as needed (abd pain) 30 tablet 2    ALPRAZolam (XANAX) 1 MG tablet TAKE ONE TABLET BY MOUTH THREE TIMES A DAY OR AS NEEDED 100 tablet 2    atorvastatin (LIPITOR) 40 MG tablet Take 1 tablet by mouth daily 30 tablet 3    ULORIC 40 MG TABS tablet Take 1 tablet by mouth daily 90 tablet 3    blood glucose test strips (TRUE METRIX BLOOD GLUCOSE TEST) strip USE TO TEST FOUR TIMES A DAY AS NEEDED 100 strip 5    TRUEPLUS LANCETS 33G MISC USE FOUR TIMES A  each 5    colchicine (COLCRYS) 0.6 MG tablet Take 1 tab po every 3 days 30 tablet 1    oxyCODONE (ROXICODONE) 5 MG immediate release tablet Take 5 mg by mouth every 4 hours as needed for Pain .  Blood Glucose Monitoring Suppl (TRUE METRIX METER) W/DEVICE KIT 1 each by Does not apply route 4 times daily 1 kit 0    aspirin 325 MG EC tablet Take 325 mg by mouth daily       No current facility-administered medications for this visit. Review of Systems  Scanned, reviewed       Objective: There were no vitals taken for this visit. Wt Readings from Last 3 Encounters:   12/12/19 183 lb 9.6 oz (83.3 kg)   10/30/19 185 lb (83.9 kg)   09/19/19 186 lb (84.4 kg)     Constitutional: Well-developed, appears stated age, cooperative, in no acute distress  H/E/N/M/T:atraumatic, normocephalic, external ears, nose, lips normal without lesions  Eyes: Lids, lashes, conjunctivae and sclerae normal, No proptosis, no redness  Neck: supple, symmetrical, no swelling  Skin: No obvious rashes or lesions present.   Skin and hair texture normal  Psychiatric: Judgement and Insight:  judgement and insight appear normal  Neuro: Normal without focal findings, speech is normal normal, speech is spontaneous  Chest: No labored breathing, no chest deformity, no stridor  Musculoskeletal: No joint deformity, swelling      8/18  Skeletal foot exam is normal, no skin lesions, toenails are normal, 10 g monofilament is detected , DP exam and urine microalbumin to   creatinine ratio.      -Hyperlipidemia, LDL goal is <100 mg/dl   -Hypertension: Continue same  -Daily ASA: 81mg  -Smoking status: non smoker

## 2020-02-10 DIAGNOSIS — E11.21 TYPE 2 DIABETES MELLITUS WITH DIABETIC NEPHROPATHY, UNSPECIFIED WHETHER LONG TERM INSULIN USE (HCC): ICD-10-CM

## 2020-02-10 DIAGNOSIS — E78.49 OTHER HYPERLIPIDEMIA: ICD-10-CM

## 2020-02-10 LAB — VITAMIN D 25-HYDROXY: 38.7 NG/ML

## 2020-02-11 ENCOUNTER — TELEPHONE (OUTPATIENT)
Dept: ENDOCRINOLOGY | Age: 65
End: 2020-02-11

## 2020-02-11 RX ORDER — ERGOCALCIFEROL (VITAMIN D2) 1250 MCG
50000 CAPSULE ORAL WEEKLY
Qty: 12 CAPSULE | Refills: 1 | Status: SHIPPED | OUTPATIENT
Start: 2020-02-11 | End: 2020-09-24 | Stop reason: ALTCHOICE

## 2020-02-11 NOTE — TELEPHONE ENCOUNTER
Left message for patient to call and get lab results      Please advise patient the vitamin D is normal but I am starting him on the higher dose vitamin D 50,000 IU weekly and assess if improves the muscle aches.

## 2020-03-19 ENCOUNTER — OFFICE VISIT (OUTPATIENT)
Dept: FAMILY MEDICINE CLINIC | Age: 65
End: 2020-03-19
Payer: COMMERCIAL

## 2020-03-19 ENCOUNTER — TELEPHONE (OUTPATIENT)
Dept: FAMILY MEDICINE CLINIC | Age: 65
End: 2020-03-19

## 2020-03-19 VITALS
SYSTOLIC BLOOD PRESSURE: 130 MMHG | DIASTOLIC BLOOD PRESSURE: 80 MMHG | WEIGHT: 183.4 LBS | HEIGHT: 70 IN | BODY MASS INDEX: 26.26 KG/M2

## 2020-03-19 PROCEDURE — G8417 CALC BMI ABV UP PARAM F/U: HCPCS | Performed by: FAMILY MEDICINE

## 2020-03-19 PROCEDURE — 4040F PNEUMOC VAC/ADMIN/RCVD: CPT | Performed by: FAMILY MEDICINE

## 2020-03-19 PROCEDURE — 99214 OFFICE O/P EST MOD 30 MIN: CPT | Performed by: FAMILY MEDICINE

## 2020-03-19 PROCEDURE — G8482 FLU IMMUNIZE ORDER/ADMIN: HCPCS | Performed by: FAMILY MEDICINE

## 2020-03-19 PROCEDURE — G8427 DOCREV CUR MEDS BY ELIG CLIN: HCPCS | Performed by: FAMILY MEDICINE

## 2020-03-19 PROCEDURE — 1036F TOBACCO NON-USER: CPT | Performed by: FAMILY MEDICINE

## 2020-03-19 PROCEDURE — 3044F HG A1C LEVEL LT 7.0%: CPT | Performed by: FAMILY MEDICINE

## 2020-03-19 PROCEDURE — 1123F ACP DISCUSS/DSCN MKR DOCD: CPT | Performed by: FAMILY MEDICINE

## 2020-03-19 PROCEDURE — 2022F DILAT RTA XM EVC RTNOPTHY: CPT | Performed by: FAMILY MEDICINE

## 2020-03-19 PROCEDURE — 3017F COLORECTAL CA SCREEN DOC REV: CPT | Performed by: FAMILY MEDICINE

## 2020-03-19 RX ORDER — FAMOTIDINE 20 MG/1
20 TABLET, FILM COATED ORAL NIGHTLY PRN
Qty: 30 TABLET | Refills: 2 | Status: SHIPPED | OUTPATIENT
Start: 2020-03-19 | End: 2020-06-18 | Stop reason: SDUPTHER

## 2020-03-19 RX ORDER — FAMOTIDINE 20 MG/1
20 TABLET, FILM COATED ORAL NIGHTLY PRN
Qty: 30 TABLET | Refills: 2 | Status: SHIPPED | OUTPATIENT
Start: 2020-03-19 | End: 2020-03-19 | Stop reason: SDUPTHER

## 2020-03-19 RX ORDER — ALPRAZOLAM 1 MG/1
TABLET ORAL
Qty: 100 TABLET | Refills: 2 | Status: SHIPPED | OUTPATIENT
Start: 2020-03-19 | End: 2020-06-18 | Stop reason: SDUPTHER

## 2020-03-19 RX ORDER — ALPRAZOLAM 1 MG/1
TABLET ORAL
Qty: 100 TABLET | Refills: 2 | Status: SHIPPED | OUTPATIENT
Start: 2020-03-19 | End: 2020-03-19 | Stop reason: SDUPTHER

## 2020-03-19 ASSESSMENT — ENCOUNTER SYMPTOMS
EYE PAIN: 0
SHORTNESS OF BREATH: 0
WHEEZING: 0
COUGH: 0
ABDOMINAL PAIN: 0

## 2020-03-19 ASSESSMENT — PATIENT HEALTH QUESTIONNAIRE - PHQ9
SUM OF ALL RESPONSES TO PHQ QUESTIONS 1-9: 2
SUM OF ALL RESPONSES TO PHQ QUESTIONS 1-9: 2
SUM OF ALL RESPONSES TO PHQ9 QUESTIONS 1 & 2: 2
1. LITTLE INTEREST OR PLEASURE IN DOING THINGS: 1
2. FEELING DOWN, DEPRESSED OR HOPELESS: 1

## 2020-03-19 NOTE — TELEPHONE ENCOUNTER
Pt calling regarding lipitor, states only has 10 pills left, and dr maya told him to take 1/2 pill to help with leg pain; was concerned that he normally gets 3 month refill; told pt to take 1/2 pill as dr maya instructed, call when getting low to request refill 20mg instead of 40mg

## 2020-03-19 NOTE — PROGRESS NOTES
pain)    Type 2 diabetes mellitus with diabetic nephropathy, unspecified whether long term insulin use (HCC)    Essential hypertension    Agoraphobia    Panic disorder            Plan:       Try 1/2 tab lipitor for any effect on myalgia  Cont other meds  rto 3 month. coun  Spent 30 minutes with patient and/or family in which greater than half the time was focused on counseling and educating patient and/or  family regarding current health [address] [facility], DO

## 2020-04-01 ENCOUNTER — TELEPHONE (OUTPATIENT)
Dept: FAMILY MEDICINE CLINIC | Age: 65
End: 2020-04-01

## 2020-04-01 RX ORDER — ATORVASTATIN CALCIUM 20 MG/1
20 TABLET, FILM COATED ORAL DAILY
Qty: 30 TABLET | Refills: 3 | Status: SHIPPED | OUTPATIENT
Start: 2020-04-01 | End: 2020-06-18 | Stop reason: SDUPTHER

## 2020-04-01 NOTE — TELEPHONE ENCOUNTER
His chart shows that he takes the full 40 mg daily. Dr. Ashly Barillas will need to address this tomorrow.

## 2020-04-02 RX ORDER — ATORVASTATIN CALCIUM 20 MG/1
20 TABLET, FILM COATED ORAL DAILY
Qty: 30 TABLET | Refills: 1 | OUTPATIENT
Start: 2020-04-02

## 2020-04-30 ENCOUNTER — VIRTUAL VISIT (OUTPATIENT)
Dept: RHEUMATOLOGY | Age: 65
End: 2020-04-30
Payer: COMMERCIAL

## 2020-04-30 PROCEDURE — 3017F COLORECTAL CA SCREEN DOC REV: CPT | Performed by: INTERNAL MEDICINE

## 2020-04-30 PROCEDURE — 4040F PNEUMOC VAC/ADMIN/RCVD: CPT | Performed by: INTERNAL MEDICINE

## 2020-04-30 PROCEDURE — 99213 OFFICE O/P EST LOW 20 MIN: CPT | Performed by: INTERNAL MEDICINE

## 2020-04-30 PROCEDURE — 1123F ACP DISCUSS/DSCN MKR DOCD: CPT | Performed by: INTERNAL MEDICINE

## 2020-04-30 PROCEDURE — G8428 CUR MEDS NOT DOCUMENT: HCPCS | Performed by: INTERNAL MEDICINE

## 2020-04-30 RX ORDER — FEBUXOSTAT 40 MG/1
40 TABLET ORAL DAILY
Qty: 90 TABLET | Refills: 1 | Status: SHIPPED | OUTPATIENT
Start: 2020-04-30 | End: 2020-11-16

## 2020-05-27 ENCOUNTER — TELEPHONE (OUTPATIENT)
Dept: FAMILY MEDICINE CLINIC | Age: 65
End: 2020-05-27

## 2020-06-18 ENCOUNTER — OFFICE VISIT (OUTPATIENT)
Dept: FAMILY MEDICINE CLINIC | Age: 65
End: 2020-06-18
Payer: COMMERCIAL

## 2020-06-18 VITALS
HEART RATE: 68 BPM | BODY MASS INDEX: 26.52 KG/M2 | HEIGHT: 71 IN | RESPIRATION RATE: 18 BRPM | DIASTOLIC BLOOD PRESSURE: 68 MMHG | WEIGHT: 189.4 LBS | OXYGEN SATURATION: 98 % | SYSTOLIC BLOOD PRESSURE: 132 MMHG | TEMPERATURE: 97.2 F

## 2020-06-18 PROCEDURE — 99214 OFFICE O/P EST MOD 30 MIN: CPT | Performed by: FAMILY MEDICINE

## 2020-06-18 PROCEDURE — 1123F ACP DISCUSS/DSCN MKR DOCD: CPT | Performed by: FAMILY MEDICINE

## 2020-06-18 PROCEDURE — G8427 DOCREV CUR MEDS BY ELIG CLIN: HCPCS | Performed by: FAMILY MEDICINE

## 2020-06-18 PROCEDURE — 1036F TOBACCO NON-USER: CPT | Performed by: FAMILY MEDICINE

## 2020-06-18 PROCEDURE — 2022F DILAT RTA XM EVC RTNOPTHY: CPT | Performed by: FAMILY MEDICINE

## 2020-06-18 PROCEDURE — 3017F COLORECTAL CA SCREEN DOC REV: CPT | Performed by: FAMILY MEDICINE

## 2020-06-18 PROCEDURE — 4040F PNEUMOC VAC/ADMIN/RCVD: CPT | Performed by: FAMILY MEDICINE

## 2020-06-18 PROCEDURE — 3044F HG A1C LEVEL LT 7.0%: CPT | Performed by: FAMILY MEDICINE

## 2020-06-18 PROCEDURE — G8417 CALC BMI ABV UP PARAM F/U: HCPCS | Performed by: FAMILY MEDICINE

## 2020-06-18 RX ORDER — ALPRAZOLAM 1 MG/1
TABLET ORAL
Qty: 100 TABLET | Refills: 2 | Status: SHIPPED | OUTPATIENT
Start: 2020-06-18 | End: 2020-09-24 | Stop reason: SDUPTHER

## 2020-06-18 RX ORDER — CHLORHEXIDINE GLUCONATE 0.12 MG/ML
15 RINSE ORAL 2 TIMES DAILY
Qty: 420 ML | Refills: 0 | Status: SHIPPED | OUTPATIENT
Start: 2020-06-18 | End: 2022-02-21 | Stop reason: SDUPTHER

## 2020-06-18 RX ORDER — ATORVASTATIN CALCIUM 20 MG/1
20 TABLET, FILM COATED ORAL DAILY
Qty: 30 TABLET | Refills: 3 | Status: SHIPPED | OUTPATIENT
Start: 2020-06-18 | End: 2020-09-24 | Stop reason: SDUPTHER

## 2020-06-18 RX ORDER — FAMOTIDINE 20 MG/1
20 TABLET, FILM COATED ORAL NIGHTLY PRN
Qty: 30 TABLET | Refills: 2 | Status: SHIPPED | OUTPATIENT
Start: 2020-06-18 | End: 2020-09-24 | Stop reason: SDUPTHER

## 2020-06-18 ASSESSMENT — ENCOUNTER SYMPTOMS
ABDOMINAL PAIN: 0
SHORTNESS OF BREATH: 0
NAUSEA: 0
VOMITING: 0
CONSTIPATION: 0
SORE THROAT: 0
COUGH: 0
SINUS PRESSURE: 0
CHEST TIGHTNESS: 0

## 2020-06-18 ASSESSMENT — PATIENT HEALTH QUESTIONNAIRE - PHQ9
2. FEELING DOWN, DEPRESSED OR HOPELESS: 0
SUM OF ALL RESPONSES TO PHQ QUESTIONS 1-9: 1
1. LITTLE INTEREST OR PLEASURE IN DOING THINGS: 1
SUM OF ALL RESPONSES TO PHQ9 QUESTIONS 1 & 2: 1
SUM OF ALL RESPONSES TO PHQ QUESTIONS 1-9: 1

## 2020-06-18 NOTE — PROGRESS NOTES
Subjective:      Patient ID: Mirtha Casey Sr. is a 77 y.o. male. HPI  Check  Up blood pressure, lipids    Concern poss hernia, abd wall    Worse when abd wall flexes  No abd pain nause    denies chest pain    No sob   remains anxious almost paranoid        Hemoglobin A1C (%)   Date Value   02/06/2020 6.6     Microalbumin, Random Urine (mg/dL)   Date Value   10/22/2019 <1.20     LDL Calculated (mg/dL)   Date Value   12/13/2019 57              Current Outpatient Medications   Medication Sig      atorvastatin (LIPITOR) 20 MG tablet Take 1 tablet by mouth daily      famotidine (PEPCID) 20 MG tablet Take 1 tablet by mouth nightly as needed (abd pain)      ergocalciferol (DRISDOL) 1.25 MG (32452 UT) capsule Take 1 capsule by mouth once a week      TRUEplus Lancets 33G MISC USE FOUR TIMES A DAY      blood glucose test strips (TRUE METRIX BLOOD GLUCOSE TEST) strip USE TO TEST FOUR TIMES A DAY AS NEEDED      ULORIC 40 MG TABS tablet Take 1 tablet by mouth daily      oxyCODONE (ROXICODONE) 5 MG immediate release tablet Take 5 mg by mouth every 4 hours as needed for Pain .  Blood Glucose Monitoring Suppl (TRUE METRIX METER) W/DEVICE KIT 1 each by Does not apply route 4 times daily      aspirin 325 MG EC tablet Take 325 mg by mouth daily      ULORIC 40 MG TABS tablet Take 1 tablet by mouth daily (Patient not taking: Reported on 6/18/2020)      colchicine (COLCRYS) 0.6 MG tablet Take 1 tab po every 3 days (Patient not taking: Reported on 6/18/2020)       No current facility-administered medications for this visit. Review of Systems   Constitutional: Negative for activity change, appetite change, fever and unexpected weight change. HENT: Negative for congestion, sinus pressure and sore throat. Respiratory: Negative for cough, chest tightness and shortness of breath. Cardiovascular: Negative for chest pain and leg swelling.    Gastrointestinal: Negative for abdominal pain, constipation, nausea and vomiting. Genitourinary: Negative for difficulty urinating, dysuria, frequency and urgency. Musculoskeletal: Negative for arthralgias. Skin: Negative for wound. Neurological: Negative for numbness. Hematological: Does not bruise/bleed easily. Psychiatric/Behavioral: The patient is nervous/anxious. A complete 14 point  review of systems was completed; pertinent positives are noted in HPI    Vitals:    06/18/20 1438   BP: (!) 142/70   Site: Left Upper Arm   Position: Sitting   Cuff Size: Large Adult   Pulse: 68   Resp: 18   Temp: 97.2 °F (36.2 °C)   TempSrc: Oral   SpO2: 98%   Weight: 189 lb 6.4 oz (85.9 kg)   Height: 5' 10.5\" (1.791 m)     Body mass index is 26.79 kg/m². Wt Readings from Last 3 Encounters:   06/18/20 189 lb 6.4 oz (85.9 kg)   03/19/20 183 lb 6.4 oz (83.2 kg)   02/06/20 182 lb (82.6 kg)     BP Readings from Last 3 Encounters:   06/18/20 (!) 142/70   03/19/20 130/80   02/06/20 132/62        BP (!) 142/70 (Site: Left Upper Arm, Position: Sitting, Cuff Size: Large Adult)   Pulse 68   Temp 97.2 °F (36.2 °C) (Oral)   Resp 18   Ht 5' 10.5\" (1.791 m)   Wt 189 lb 6.4 oz (85.9 kg)   SpO2 98%   BMI 26.79 kg/m²   /68 (Site: Left Upper Arm, Position: Sitting, Cuff Size: Large Adult)   Pulse 68   Temp 97.2 °F (36.2 °C) (Oral)   Resp 18   Ht 5' 10.5\" (1.791 m)   Wt 189 lb 6.4 oz (85.9 kg)   SpO2 98%   BMI 26.79 kg/m²    Objective:   Physical Exam  Constitutional:       General: He is not in acute distress. Appearance: Normal appearance. He is obese. He is not ill-appearing. Comments: anxious   HENT:      Mouth/Throat:      Mouth: Mucous membranes are moist.   Eyes:      Conjunctiva/sclera: Conjunctivae normal.   Cardiovascular:      Rate and Rhythm: Normal rate and regular rhythm. Pulmonary:      Effort: Pulmonary effort is normal. No respiratory distress. Abdominal:      Comments: diasthesais recti noted with valsalva   Skin:     Findings: No rash.

## 2020-06-29 ENCOUNTER — TELEPHONE (OUTPATIENT)
Dept: FAMILY MEDICINE CLINIC | Age: 65
End: 2020-06-29

## 2020-06-29 ENCOUNTER — INITIAL CONSULT (OUTPATIENT)
Dept: SURGERY | Age: 65
End: 2020-06-29
Payer: COMMERCIAL

## 2020-06-29 VITALS
BODY MASS INDEX: 26.46 KG/M2 | HEART RATE: 72 BPM | DIASTOLIC BLOOD PRESSURE: 82 MMHG | SYSTOLIC BLOOD PRESSURE: 141 MMHG | HEIGHT: 71 IN | WEIGHT: 189 LBS

## 2020-06-29 PROCEDURE — G8417 CALC BMI ABV UP PARAM F/U: HCPCS | Performed by: SURGERY

## 2020-06-29 PROCEDURE — G8427 DOCREV CUR MEDS BY ELIG CLIN: HCPCS | Performed by: SURGERY

## 2020-06-29 PROCEDURE — 1123F ACP DISCUSS/DSCN MKR DOCD: CPT | Performed by: SURGERY

## 2020-06-29 PROCEDURE — 99213 OFFICE O/P EST LOW 20 MIN: CPT | Performed by: SURGERY

## 2020-06-29 PROCEDURE — 3017F COLORECTAL CA SCREEN DOC REV: CPT | Performed by: SURGERY

## 2020-06-29 PROCEDURE — 1036F TOBACCO NON-USER: CPT | Performed by: SURGERY

## 2020-06-29 PROCEDURE — 4040F PNEUMOC VAC/ADMIN/RCVD: CPT | Performed by: SURGERY

## 2020-06-29 NOTE — TELEPHONE ENCOUNTER
Pt called to let Dr Radha Schilling know that he seen  Cara and does not need surgery for his Hernia. He just a little concern with his bp.  Please give pt a call [phone]

## 2020-06-30 ASSESSMENT — ENCOUNTER SYMPTOMS: ABDOMINAL DISTENTION: 1

## 2020-06-30 NOTE — PROGRESS NOTES
Take 5 mg by mouth every 4 hours as needed for Pain .  Blood Glucose Monitoring Suppl (TRUE METRIX METER) W/DEVICE KIT 1 each by Does not apply route 4 times daily 1 kit 0    aspirin 325 MG EC tablet Take 325 mg by mouth daily      ergocalciferol (DRISDOL) 1.25 MG (44290 UT) capsule Take 1 capsule by mouth once a week 12 capsule 1     No current facility-administered medications for this visit. Prior to Admission medications    Medication Sig Start Date End Date Taking? Authorizing Provider   ALPRAZolam Pradeep [place]) 1 MG tablet TAKE ONE TABLET BY MOUTH THREE TIMES A DAY AND AS NEEDED 6/18/20 8/18/20 Yes Lobo Portillo,    famotidine (PEPCID) 20 MG tablet Take 1 tablet by mouth nightly as needed (abd pain) 6/18/20  Yes Lobo Portillo DO   atorvastatin (LIPITOR) 20 MG tablet Take 1 tablet by mouth daily 6/18/20  Yes Lobo Portillo DO   chlorhexidine (PERIDEX) 0.12 % solution Take 15 mLs by mouth 2 times daily for 14 days 6/18/20 7/2/20 Yes Lobo Portillo DO   ULORIC 40 MG TABS tablet Take 1 tablet by mouth daily 4/30/20  Yes Zackery Hull MD   TRUEplus Lancets 33G MISC USE FOUR TIMES A DAY 2/6/20  Yes Burak Harris MD   blood glucose test strips (TRUE METRIX BLOOD GLUCOSE TEST) strip USE TO TEST FOUR TIMES A DAY AS NEEDED 2/6/20  Yes Burak Harris MD   ULORIC 40 MG TABS tablet Take 1 tablet by mouth daily 11/8/19  Yes Zackery Hull MD   colchicine (COLCRYS) 0.6 MG tablet Take 1 tab po every 3 days 3/18/19  Yes Zackery Hull MD   oxyCODONE (ROXICODONE) 5 MG immediate release tablet Take 5 mg by mouth every 4 hours as needed for Pain .    Yes Historical Provider, MD   Blood Glucose Monitoring Suppl (TRUE METRIX METER) W/DEVICE KIT 1 each by Does not apply route 4 times daily 8/25/16  Yes Burak Harris MD   aspirin 325 MG EC tablet Take 325 mg by mouth daily   Yes Historical Provider, MD   ergocalciferol (DRISDOL) 1.25 MG (58509 UT) capsule Take 1 capsule by mouth once a week 2/11/20 6/18/20

## 2020-08-06 ENCOUNTER — OFFICE VISIT (OUTPATIENT)
Dept: ENDOCRINOLOGY | Age: 65
End: 2020-08-06
Payer: COMMERCIAL

## 2020-08-06 VITALS
WEIGHT: 184.6 LBS | RESPIRATION RATE: 18 BRPM | SYSTOLIC BLOOD PRESSURE: 138 MMHG | DIASTOLIC BLOOD PRESSURE: 68 MMHG | OXYGEN SATURATION: 98 % | HEIGHT: 71 IN | BODY MASS INDEX: 25.84 KG/M2 | HEART RATE: 74 BPM

## 2020-08-06 LAB — HBA1C MFR BLD: 6.3 %

## 2020-08-06 PROCEDURE — 2022F DILAT RTA XM EVC RTNOPTHY: CPT | Performed by: INTERNAL MEDICINE

## 2020-08-06 PROCEDURE — 3044F HG A1C LEVEL LT 7.0%: CPT | Performed by: INTERNAL MEDICINE

## 2020-08-06 PROCEDURE — 1123F ACP DISCUSS/DSCN MKR DOCD: CPT | Performed by: INTERNAL MEDICINE

## 2020-08-06 PROCEDURE — 99213 OFFICE O/P EST LOW 20 MIN: CPT | Performed by: INTERNAL MEDICINE

## 2020-08-06 PROCEDURE — 1036F TOBACCO NON-USER: CPT | Performed by: INTERNAL MEDICINE

## 2020-08-06 PROCEDURE — G8427 DOCREV CUR MEDS BY ELIG CLIN: HCPCS | Performed by: INTERNAL MEDICINE

## 2020-08-06 PROCEDURE — 83036 HEMOGLOBIN GLYCOSYLATED A1C: CPT | Performed by: INTERNAL MEDICINE

## 2020-08-06 PROCEDURE — 4040F PNEUMOC VAC/ADMIN/RCVD: CPT | Performed by: INTERNAL MEDICINE

## 2020-08-06 PROCEDURE — 3017F COLORECTAL CA SCREEN DOC REV: CPT | Performed by: INTERNAL MEDICINE

## 2020-08-06 PROCEDURE — G8417 CALC BMI ABV UP PARAM F/U: HCPCS | Performed by: INTERNAL MEDICINE

## 2020-08-06 RX ORDER — GLUCOSAM/CHON-MSM1/C/MANG/BOSW 500-416.6
TABLET ORAL
Qty: 100 EACH | Refills: 5 | Status: SHIPPED | OUTPATIENT
Start: 2020-08-06 | End: 2021-01-19 | Stop reason: SDUPTHER

## 2020-08-06 RX ORDER — CALCIUM CITRATE/VITAMIN D3 200MG-6.25
TABLET ORAL
Qty: 100 STRIP | Refills: 5 | Status: SHIPPED | OUTPATIENT
Start: 2020-08-06 | End: 2020-09-15

## 2020-08-06 NOTE — PROGRESS NOTES
Seen as f/u patient for diabetes    Interim:  Glucose stable  No issues  Reports issues with Lipitor, muscle aches  Left 4th finger redness/scaling for a month, after applied sensitizer    Diagnosed with Type 2 diabetes mellitus 5 years  Known diabetic complications: Nephropathy( cause?)     Current diabetic medications   onglyza 5mg-  Off of it as BG improved  GLipizide - stopped as had hypoglycemia- BG 38    Mild, controlled    Off metformin, has CKD    Last A1c 6.4%<-----6.6%<------6.4%<------6.9%<---- 6.8%<-----6.4% <------6.6%<----6.5%<-----5.9 on 3/17<------6.1 on 12/16<------- 6.2% on 8/16 <------- 6.8 on 5/16 <--- 10.1<----- 7.5 on 8/15<--- 9.4 <---- 7.9    Prior visit with dietician: No  Current diet: on average, 3 meals per day  Started to restrict CHO  Current exercise: Walking  Current monitoring regimen: home blood tests 3-4  times daily     Has brought blood glucose log/meter: yes  Home blood sugar records:   Any episodes of hypoglycemia? no    No Hx of CAD , PVD, CVA    Hyperlipidemia:stable, tolerating Zocor 20mg   on 1/13    LDL 72 on 9/17   on 12/18  LDL 57 on 12/19  lipitor 40mg    H/o crestor use-reports side effects    Repatha was denied per patient    Last eye exam: 4/16  Last foot exam: 12/19  Last microalbumin to creatinine ratio: 9/17   Cr 1.9 on 3/16---> 1.5 on 8/16---> 1.4 on 10/16--> 1.2 on 6/18--> 1.3  HTN: Stable, lisinopril 10mg- off it due to low BP    He has arthritis, he was inquiring about steroids and effect on DM    Past Medical History:   Diagnosis Date    Anxiety     HTN (hypertension)     Hyperlipemia     Panic disorder     Type II or unspecified type diabetes mellitus without mention of complication, not stated as uncontrolled     Umbilical hernia      Past Surgical History:   Procedure Laterality Date    CHOLECYSTECTOMY, LAPAROSCOPIC  09/16/2016    with gram    NASAL POLYP SURGERY      SINUS SURGERY      TONSILLECTOMY      UMBILICAL HERNIA REPAIR  09/16/2016     Current Outpatient Medications   Medication Sig Dispense Refill    ALPRAZolam (XANAX) 1 MG tablet TAKE ONE TABLET BY MOUTH THREE TIMES A DAY AND AS NEEDED 100 tablet 2    famotidine (PEPCID) 20 MG tablet Take 1 tablet by mouth nightly as needed (abd pain) 30 tablet 2    atorvastatin (LIPITOR) 20 MG tablet Take 1 tablet by mouth daily 30 tablet 3    ULORIC 40 MG TABS tablet Take 1 tablet by mouth daily 90 tablet 1    TRUEplus Lancets 33G MISC USE FOUR TIMES A  each 5    blood glucose test strips (TRUE METRIX BLOOD GLUCOSE TEST) strip USE TO TEST FOUR TIMES A DAY AS NEEDED 100 strip 5    ULORIC 40 MG TABS tablet Take 1 tablet by mouth daily 90 tablet 3    Blood Glucose Monitoring Suppl (TRUE METRIX METER) W/DEVICE KIT 1 each by Does not apply route 4 times daily 1 kit 0    aspirin 325 MG EC tablet Take 325 mg by mouth daily      ergocalciferol (DRISDOL) 1.25 MG (66714 UT) capsule Take 1 capsule by mouth once a week 12 capsule 1    colchicine (COLCRYS) 0.6 MG tablet Take 1 tab po every 3 days (Patient not taking: Reported on 8/6/2020) 30 tablet 1    oxyCODONE (ROXICODONE) 5 MG immediate release tablet Take 5 mg by mouth every 4 hours as needed for Pain . No current facility-administered medications for this visit.         Review of Systems  Scanned, reviewed       Objective:        /68 (Site: Left Upper Arm, Position: Sitting, Cuff Size: Medium Adult)   Pulse 74   Resp 18   Ht 5' 10.5\" (1.791 m)   Wt 184 lb 9.6 oz (83.7 kg)   SpO2 98%   BMI 26.11 kg/m²   Wt Readings from Last 3 Encounters:   08/06/20 184 lb 9.6 oz (83.7 kg)   06/29/20 189 lb (85.7 kg)   06/18/20 189 lb 6.4 oz (85.9 kg)     Constitutional: Well-developed, appears stated age, cooperative, in no acute distress  H/E/N/M/T:atraumatic, normocephalic, external ears, nose, lips normal without lesions  Eyes: Lids, lashes, conjunctivae and sclerae normal, No proptosis, no redness  Neck: supple, symmetrical, no swelling  Skin: No obvious rashes or lesions present. Skin and hair texture normal  Psychiatric: Judgement and Insight:  judgement and insight appear normal  Neuro: Normal without focal findings, speech is normal normal, speech is spontaneous  Chest: No labored breathing, no chest deformity, no stridor  Musculoskeletal: No joint deformity, swelling      8/18  Skeletal foot exam is normal, no skin lesions, toenails are normal, 10 g monofilament is detected , DP palpable, Callus+      Lab Reviewed   No components found for: CHLPL  Lab Results   Component Value Date    TRIG 67 12/13/2019    TRIG 85 12/18/2018    TRIG 68 09/22/2017     Lab Results   Component Value Date    HDL 42 12/13/2019    HDL 43 12/18/2018    HDL 48 09/22/2017     Lab Results   Component Value Date    LDLCALC 57 12/13/2019    LDLCALC 103 (H) 12/18/2018    LDLCALC 72 09/22/2017     Lab Results   Component Value Date    LABVLDL 13 12/13/2019    LABVLDL 17 12/18/2018    LABVLDL 14 09/22/2017     Lab Results   Component Value Date    LABA1C 6.6 02/06/2020       Assessment:     Ramon Schulte Sr. is a 77 y.o. male with :    1.T2DM: Longstanding, controlled, has CKD, need to avoid sensitizer and SGLT-2 inhibitor. Had low with Glipizide,Off DPP IV given lows, has lost weight, dietary changes, Blood glucose improved, A1c at goal, re enforced changes made by patient. He has not been exercising much, advised walking /life style change. 2.HTN: BP near goal , off medication  3. HLD: LDL at goal but statin intolerance, muscle aches. PCSK-9 inhibitor not covered per patient. He will be seeing cardiology  Takes Vit D 1000 IU , vit D 38, switched to D2, did not help  Can go back to D3  4. CKDIII: saw Nephrology, Cr improved  5. Gout  6. Panic Disorder  7. ED: Libido is good, nl testosterone, as he would like evaluated but concerned about side effects, discussed it may not help ED much, advised see urology  8.  Skin Rash: Advised to see dermatology    Plan:      Hold metformin and onglyza   Advise to check blood sugar 1 times a day but he would like to monitor more frequently as helped controlled- recommend TID                                                               Patient to send blood sugar log for titration. Advise to exercise regularly. Advise to low simple carbohydrate and protein with each  meal diet. 40gm of CHO meal per day   Diabetes Care: recommend yearly eye exam, foot exam and urine microalbumin to   creatinine ratio.      -Hyperlipidemia, LDL goal is <100 mg/dl   -Hypertension: Continue same  -Daily ASA: 81mg  -Smoking status: non smoker

## 2020-08-10 DIAGNOSIS — I10 ESSENTIAL HYPERTENSION: ICD-10-CM

## 2020-08-10 DIAGNOSIS — E11.21 TYPE 2 DIABETES MELLITUS WITH DIABETIC NEPHROPATHY, UNSPECIFIED WHETHER LONG TERM INSULIN USE (HCC): ICD-10-CM

## 2020-08-10 LAB
CREATININE URINE: 88.5 MG/DL (ref 39–259)
MICROALBUMIN UR-MCNC: <1.2 MG/DL
MICROALBUMIN/CREAT UR-RTO: NORMAL MG/G (ref 0–30)

## 2020-08-31 ENCOUNTER — TELEPHONE (OUTPATIENT)
Dept: RHEUMATOLOGY | Age: 65
End: 2020-08-31

## 2020-08-31 ENCOUNTER — TELEPHONE (OUTPATIENT)
Dept: ENDOCRINOLOGY | Age: 65
End: 2020-08-31

## 2020-08-31 NOTE — TELEPHONE ENCOUNTER
Patient is calling about his left hand Gout Flare up . Wanted to know should he resumes Colchine for a couple of days . He can't make a fist and he have a rash [phone].

## 2020-08-31 NOTE — TELEPHONE ENCOUNTER
Does not sound like gout flare- see PCP or me.   There is no harm in taking colchicine for a couple of days, if not getting better he needs to be seen

## 2020-09-01 ENCOUNTER — OFFICE VISIT (OUTPATIENT)
Dept: RHEUMATOLOGY | Age: 65
End: 2020-09-01
Payer: COMMERCIAL

## 2020-09-01 PROCEDURE — 1036F TOBACCO NON-USER: CPT | Performed by: INTERNAL MEDICINE

## 2020-09-01 PROCEDURE — 4040F PNEUMOC VAC/ADMIN/RCVD: CPT | Performed by: INTERNAL MEDICINE

## 2020-09-01 PROCEDURE — 99214 OFFICE O/P EST MOD 30 MIN: CPT | Performed by: INTERNAL MEDICINE

## 2020-09-01 PROCEDURE — G8417 CALC BMI ABV UP PARAM F/U: HCPCS | Performed by: INTERNAL MEDICINE

## 2020-09-01 PROCEDURE — 1123F ACP DISCUSS/DSCN MKR DOCD: CPT | Performed by: INTERNAL MEDICINE

## 2020-09-01 PROCEDURE — G8428 CUR MEDS NOT DOCUMENT: HCPCS | Performed by: INTERNAL MEDICINE

## 2020-09-01 PROCEDURE — 3017F COLORECTAL CA SCREEN DOC REV: CPT | Performed by: INTERNAL MEDICINE

## 2020-09-01 RX ORDER — COLCHICINE 0.6 MG/1
TABLET ORAL
Qty: 14 TABLET | Refills: 0 | Status: SHIPPED | OUTPATIENT
Start: 2020-09-01 | End: 2021-02-04 | Stop reason: ALTCHOICE

## 2020-09-01 NOTE — PROGRESS NOTES
[address] Delmar Luciano MD                                                                                                                [address], [facility] [address]                                                [phone] (F)      Primary provider: Deysi Brown DO  Patient identification: Ryan Velasquez Sr. ,: 1955,Sex: male         ASSESSMENT/PLAN:  Ligia Batres was seen today for rash. Diagnoses and all orders for this visit:    Contact dermatitis due to other chemical product, unspecified contact dermatitis type    Finger swelling    Idiopathic chronic gout of multiple sites without tophus    Other orders  -     Wound Dressings (MEDIHONEY) GEL gel; Apply 1 each topically as needed (use in affected area)  -     colchicine (COLCRYS) 0.6 MG tablet; Take 1 tab po BID      Clinically suspect contact dermatitis from hand -acute visit for extremely pruritic, excoriating eczematous weepy erythematous rash on the dorsum of his hands and fingers after using certain brand of hand . He also has quite a bit of reactive erythema and soft tissue swelling without focal tenderness on the dorsum of the hand- Left hand much worse than R. Left third MCP joint is mildly tender, red, I believe this is reactive soft tissue swelling. Patient firmly believes that is gout which I highly doubt however he prefers to take colchicine, therefore I did prescribed 1 tablet a day for 5 days. In case, if symptoms get worse with redness, swelling or erythema or he develops fevers chills or any signs of infection, he was advised to follow-up with primary care physician or call me will probably need an antibiotics. Apply topical moisturizer, and he also wanted med honey gel to apply on those sores.   Given the amount of excoriation and soft tissue swelling, short-term oral steroid would be a reasonable option however he is very concerned about taking steroid due to possibly raising blood sugar. Continue Uloric for gout. Follow-up in 6 months. Patient indicates understanding and agrees with the management plan. I reviewed patients medical information and medical history in the medical records. Note is transcribed using voice recognition software. Inadvertent computerized transcription errors may be present. ##################################################################    Subjective:  He called me yesterday because of extremely pruritic rash that affected his hand as well as swelling of 1 of the finger thinking that it was gout flare, therefore made this appointment. He believes that a certain brand of hand  he used caused irritation of his hand, and started noticing extreme pruritus followed by redness, excoriating weepy lesions mainly on the dorsum of his left hand as well as fingers and finger webs, mild symptoms in his right hand. He also noticed redness over the left third knuckle associated pain, some swelling and could not bend. He had previous gout flares very similar, therefore suspected it could be a gout flare and requesting colchicine. No fever, chills or other systemic symptoms of infection. All other ROS are negative. Tolerating mediations well. See HPI. Past Medical History:   Diagnosis Date    Anxiety     HTN (hypertension)     Hyperlipemia     Panic disorder     Type II or unspecified type diabetes mellitus without mention of complication, not stated as uncontrolled     Umbilical hernia      Past Surgical History:   Procedure Laterality Date    CHOLECYSTECTOMY, LAPAROSCOPIC  09/16/2016    with gram    NASAL POLYP SURGERY      SINUS SURGERY      TONSILLECTOMY      UMBILICAL HERNIA REPAIR  09/16/2016     Person, family history reviewed and updated.   Current Outpatient Medications   Medication Sig Dispense Refill    Wound Dressings (MEDIHONEY) GEL gel Apply 1 each topically as needed (use in affected area) 1 Tube 2    colchicine (COLCRYS) 0.6 MG tablet Take 1 tab po BID 14 tablet 0    blood glucose test strips (TRUE METRIX BLOOD GLUCOSE TEST) strip USE TO TEST FOUR TIMES A DAY AS NEEDED 100 strip 5    TRUEplus Lancets 33G MISC USE FOUR TIMES A  each 5    famotidine (PEPCID) 20 MG tablet Take 1 tablet by mouth nightly as needed (abd pain) 30 tablet 2    atorvastatin (LIPITOR) 20 MG tablet Take 1 tablet by mouth daily 30 tablet 3    ULORIC 40 MG TABS tablet Take 1 tablet by mouth daily 90 tablet 1    ergocalciferol (DRISDOL) 1.25 MG (52913 UT) capsule Take 1 capsule by mouth once a week 12 capsule 1    ULORIC 40 MG TABS tablet Take 1 tablet by mouth daily 90 tablet 3    colchicine (COLCRYS) 0.6 MG tablet Take 1 tab po every 3 days (Patient not taking: Reported on 2020) 30 tablet 1    oxyCODONE (ROXICODONE) 5 MG immediate release tablet Take 5 mg by mouth every 4 hours as needed for Pain .  Blood Glucose Monitoring Suppl (TRUE METRIX METER) W/DEVICE KIT 1 each by Does not apply route 4 times daily 1 kit 0    aspirin 325 MG EC tablet Take 325 mg by mouth daily       No current facility-administered medications for this visit. Allergies   Allergen Reactions    Cephalexin     Levofloxacin     Penicillins     Succinylcholine      Does not want. Pt son had reaction to Succs and almost , pt was told not to ever take Succs.  Sulfa Antibiotics     Tetracyclines & Related     Verapamil     Vicodin [Hydrocodone-Acetaminophen]      Rash; ithching         OBJECTIVE  Physical    There were no vitals filed for this visit. General appearance/psychiatric: Well nourished and well groomed, normal judgment. Alert, appears stated age and cooperative. MKS: Other than mild redness, swelling, tenderness on the dorsum of third left MCP, normal musculoskeletal examination.   Skin: Eczematous excoriating, weepy erythematous skin eruption on the dorsum of left hand, dorsum of fingers as well as webspace, very mild findings on the dorsum of right hand. Rest of the skin is normal.  HEENT: Normal lids/conjunctiva and pupils. No oral or nasal ulcers. Salivary glands reveals no evidence of abnormality. Data:  Lab Results   Component Value Date    WBC 6.5 08/08/2019    RBC 4.64 08/08/2019    HGB 14.5 08/08/2019    HCT 43.1 08/08/2019     08/08/2019    MCV 93.0 08/08/2019    MCH 31.3 08/08/2019    MCHC 33.7 08/08/2019    RDW 12.8 08/08/2019    LYMPHOPCT 31.5 08/08/2019    MONOPCT 8.1 08/08/2019    BASOPCT 0.5 08/08/2019    MONOSABS 0.5 08/08/2019    LYMPHSABS 2.0 08/08/2019    EOSABS 0.4 08/08/2019    BASOSABS 0.0 08/08/2019       Chemistry        Component Value Date/Time     12/13/2019 1820    K 4.5 12/13/2019 1820     12/13/2019 1820    CO2 25 12/13/2019 1820    BUN 31 (H) 12/13/2019 1820    CREATININE 1.3 12/13/2019 1820        Component Value Date/Time    CALCIUM 9.7 12/13/2019 1820    ALKPHOS 62 12/13/2019 1820    AST 23 12/13/2019 1820    ALT 18 12/13/2019 1820    BILITOT 1.4 (H) 12/13/2019 1820          Lab Results   Component Value Date    SEDRATE 14 08/18/2016        I thank you for giving me the opportunity to be involved in [address] [place] [address] and I look forward following Douglas Welsh along with you. If you have any questions or concerns please feel free to contact me at any time.     Jalyn Bustillos MD 09/01/20

## 2020-09-02 ENCOUNTER — TELEPHONE (OUTPATIENT)
Dept: FAMILY MEDICINE CLINIC | Age: 65
End: 2020-09-02

## 2020-09-02 NOTE — TELEPHONE ENCOUNTER
----- Message from Domitila Sims sent at 9/1/2020  4:45 PM EDT -----  Subject: Message to Provider    QUESTIONS  Information for Provider? Patient is requesting paper work for lab orders   as he states the drawsites require them  ---------------------------------------------------------------------------  --------------  CALL BACK INFO  What is the best way for the office to contact you? OK to leave message on   voicemail  Preferred Call Back Phone Number? [phone]  ---------------------------------------------------------------------------  --------------  SCRIPT ANSWERS  Relationship to Patient?  Self

## 2020-09-08 DIAGNOSIS — E11.21 TYPE 2 DIABETES MELLITUS WITH DIABETIC NEPHROPATHY, UNSPECIFIED WHETHER LONG TERM INSULIN USE (HCC): ICD-10-CM

## 2020-09-08 DIAGNOSIS — I10 ESSENTIAL HYPERTENSION: ICD-10-CM

## 2020-09-08 DIAGNOSIS — E78.2 MIXED HYPERLIPIDEMIA: ICD-10-CM

## 2020-09-08 LAB
ALT SERPL-CCNC: 26 U/L (ref 10–40)
AST SERPL-CCNC: 27 U/L (ref 15–37)
CHOLESTEROL, TOTAL: 111 MG/DL (ref 0–199)
HDLC SERPL-MCNC: 44 MG/DL (ref 40–60)
LDL CHOLESTEROL CALCULATED: 55 MG/DL
TRIGL SERPL-MCNC: 60 MG/DL (ref 0–150)
VLDLC SERPL CALC-MCNC: 12 MG/DL

## 2020-09-09 ENCOUNTER — TELEPHONE (OUTPATIENT)
Dept: ENDOCRINOLOGY | Age: 65
End: 2020-09-09

## 2020-09-09 LAB
ESTIMATED AVERAGE GLUCOSE: 139.9 MG/DL
HBA1C MFR BLD: 6.5 %

## 2020-09-09 NOTE — TELEPHONE ENCOUNTER
Pt has exemia on his hands and a doctor wants to put him on prednisone and he would like to talk to someone before he starts this

## 2020-09-10 ENCOUNTER — TELEPHONE (OUTPATIENT)
Dept: RHEUMATOLOGY | Age: 65
End: 2020-09-10

## 2020-09-10 NOTE — TELEPHONE ENCOUNTER
Please advise with the prednisone, glucose can be higher, so he will have to monitor.   IF glucose > 200mg/dl, can inform use and we can place him on a medication as long as he takes the steroids

## 2020-09-10 NOTE — TELEPHONE ENCOUNTER
Called and informed PT of MD message  Please advise with the prednisone, glucose can be higher, so he will have to monitor.   IF glucose > 200mg/dl, can inform use and we can place him on a medication as long as he takes the steroids    PT verbalized undersdtanding

## 2020-09-10 NOTE — TELEPHONE ENCOUNTER
Patient is asking is it a Prednisone cream to use because Prednisone spike his blood sugar. He talked to  and he states .  [phone]

## 2020-09-11 NOTE — TELEPHONE ENCOUNTER
I gave patient the message . Which he stated he didn't want to take OTC Hydrocortisone . He would like a script also he needs someone to help him with his rash.

## 2020-09-14 ENCOUNTER — TELEPHONE (OUTPATIENT)
Dept: ENDOCRINOLOGY | Age: 65
End: 2020-09-14

## 2020-09-14 NOTE — TELEPHONE ENCOUNTER
Pt called said pharmacy canceled PA for Trumetric strips and lancets.  PT only wants name brand please call and let him know whats going on

## 2020-09-15 RX ORDER — CALCIUM CITRATE/VITAMIN D3 200MG-6.25
TABLET ORAL
Qty: 100 STRIP | Refills: 3 | Status: SHIPPED | OUTPATIENT
Start: 2020-09-15 | End: 2021-01-19 | Stop reason: SDUPTHER

## 2020-09-24 ENCOUNTER — OFFICE VISIT (OUTPATIENT)
Dept: FAMILY MEDICINE CLINIC | Age: 65
End: 2020-09-24
Payer: COMMERCIAL

## 2020-09-24 VITALS
WEIGHT: 188.6 LBS | BODY MASS INDEX: 26.4 KG/M2 | DIASTOLIC BLOOD PRESSURE: 80 MMHG | TEMPERATURE: 98.4 F | SYSTOLIC BLOOD PRESSURE: 134 MMHG | HEIGHT: 71 IN

## 2020-09-24 PROBLEM — J45.909 ASTHMATIC BRONCHITIS: Status: ACTIVE | Noted: 2020-09-24

## 2020-09-24 PROCEDURE — 1036F TOBACCO NON-USER: CPT | Performed by: FAMILY MEDICINE

## 2020-09-24 PROCEDURE — 2022F DILAT RTA XM EVC RTNOPTHY: CPT | Performed by: FAMILY MEDICINE

## 2020-09-24 PROCEDURE — G8417 CALC BMI ABV UP PARAM F/U: HCPCS | Performed by: FAMILY MEDICINE

## 2020-09-24 PROCEDURE — 3017F COLORECTAL CA SCREEN DOC REV: CPT | Performed by: FAMILY MEDICINE

## 2020-09-24 PROCEDURE — G8427 DOCREV CUR MEDS BY ELIG CLIN: HCPCS | Performed by: FAMILY MEDICINE

## 2020-09-24 PROCEDURE — 1123F ACP DISCUSS/DSCN MKR DOCD: CPT | Performed by: FAMILY MEDICINE

## 2020-09-24 PROCEDURE — 3044F HG A1C LEVEL LT 7.0%: CPT | Performed by: FAMILY MEDICINE

## 2020-09-24 PROCEDURE — 4040F PNEUMOC VAC/ADMIN/RCVD: CPT | Performed by: FAMILY MEDICINE

## 2020-09-24 PROCEDURE — 99214 OFFICE O/P EST MOD 30 MIN: CPT | Performed by: FAMILY MEDICINE

## 2020-09-24 RX ORDER — ALPRAZOLAM 1 MG/1
TABLET ORAL
Qty: 100 TABLET | Refills: 2 | Status: SHIPPED | OUTPATIENT
Start: 2020-09-24 | End: 2020-12-17 | Stop reason: SDUPTHER

## 2020-09-24 RX ORDER — ATORVASTATIN CALCIUM 20 MG/1
20 TABLET, FILM COATED ORAL DAILY
Qty: 30 TABLET | Refills: 3 | Status: SHIPPED | OUTPATIENT
Start: 2020-09-24 | End: 2020-12-17 | Stop reason: SDUPTHER

## 2020-09-24 RX ORDER — FAMOTIDINE 20 MG/1
20 TABLET, FILM COATED ORAL NIGHTLY PRN
Qty: 30 TABLET | Refills: 2 | Status: SHIPPED | OUTPATIENT
Start: 2020-09-24 | End: 2020-12-17 | Stop reason: SDUPTHER

## 2020-09-24 ASSESSMENT — PATIENT HEALTH QUESTIONNAIRE - PHQ9
SUM OF ALL RESPONSES TO PHQ QUESTIONS 1-9: 1
SUM OF ALL RESPONSES TO PHQ QUESTIONS 1-9: 1
SUM OF ALL RESPONSES TO PHQ9 QUESTIONS 1 & 2: 1
1. LITTLE INTEREST OR PLEASURE IN DOING THINGS: 1
2. FEELING DOWN, DEPRESSED OR HOPELESS: 0

## 2020-09-24 ASSESSMENT — ENCOUNTER SYMPTOMS
SHORTNESS OF BREATH: 0
EYE PAIN: 0
COUGH: 0
WHEEZING: 0
ABDOMINAL PAIN: 0

## 2020-09-24 NOTE — PROGRESS NOTES
4Subjective:      Patient ID: Grace Dias is a 77 y.o. male. HPI  Check up  Med refill  Currently lipitor 20 mg    Lipid profle looks good    Review of Systems   Constitutional: Negative for appetite change, fatigue and unexpected weight change. Eyes: Negative for pain and visual disturbance. Respiratory: Negative for cough, shortness of breath and wheezing. Cardiovascular: Negative for chest pain, palpitations and leg swelling. Gastrointestinal: Negative for abdominal pain. Endocrine: Negative for polyuria. Genitourinary: Negative for difficulty urinating. Neurological: Negative for speech difficulty, numbness and headaches. Psychiatric/Behavioral: Negative for confusion and sleep disturbance. The patient is nervous/anxious. A complete 14 point  review of systems was completed; pertinent positives are noted in HPI    Vitals:    09/24/20 1426   BP: 134/80   Temp: 98.4 °F (36.9 °C)   Weight: 188 lb 9.6 oz (85.5 kg)   Height: 5' 10.5\" (1.791 m)     Body mass index is 26.68 kg/m². Wt Readings from Last 3 Encounters:   09/24/20 188 lb 9.6 oz (85.5 kg)   08/06/20 184 lb 9.6 oz (83.7 kg)   06/29/20 189 lb (85.7 kg)     BP Readings from Last 3 Encounters:   09/24/20 134/80   08/06/20 138/68   06/29/20 (!) 141/82        /80   Temp 98.4 °F (36.9 °C)   Ht 5' 10.5\" (1.791 m)   Wt 188 lb 9.6 oz (85.5 kg)   BMI 26.68 kg/m²    Objective:   Physical Exam  Constitutional:       General: He is not in acute distress. Appearance: He is well-developed. HENT:      Right Ear: External ear normal.      Left Ear: External ear normal.      Nose: Nose normal.   Eyes:      General: No scleral icterus. Conjunctiva/sclera: Conjunctivae normal.   Neck:      Musculoskeletal: Neck supple. Thyroid: No thyromegaly. Cardiovascular:      Rate and Rhythm: Normal rate and regular rhythm. Heart sounds: Normal heart sounds. No murmur.    Pulmonary:      Effort: Pulmonary effort is normal. No respiratory distress. Breath sounds: Normal breath sounds. Abdominal:      General: Bowel sounds are normal.      Palpations: Abdomen is soft. Tenderness: There is no abdominal tenderness. Lymphadenopathy:      Cervical: No cervical adenopathy. Skin:     General: Skin is warm. Findings: No rash. Neurological:      Mental Status: He is alert and oriented to person, place, and time. Psychiatric:         Thought Content: Thought content normal.      Comments: anxious         Assessment:      Assessment/Plan:  Dorota Gutierrez was seen today for anxiety, hyperlipidemia and 3 month follow-up. Diagnoses and all orders for this visit:    Type 2 diabetes mellitus with diabetic nephropathy, unspecified whether long term insulin use ([facility] [address] good control    Mild intermittent asthmatic bronchitis without complication, resolved     Anxiety, chronic stable  -     ALPRAZolam (XANAX) 1 MG tablet; TAKE ONE TABLET BY MOUTH THREE TIMES A DAY AND AS NEEDED    Gastroesophageal reflux disease without esophagitis, improved with med  -     famotidine (PEPCID) 20 MG tablet; Take 1 tablet by mouth nightly as needed (abd pain)    Mixed hyperlipidemia, improved with med  -     atorvastatin (LIPITOR) 20 MG tablet; Take 1 tablet by mouth daily              Plan:      Cont meds  Med refuills  Recent lab looks good   rto3 month  Loose weight    Controlled Substance Monitoring:    Acute and Chronic Pain Monitoring:   RX Monitoring 9/24/2020   Attestation -   Periodic Controlled Substance Monitoring Possible medication side effects, risk of tolerance/dependence & alternative treatments discussed. ;No signs of potential drug abuse or diversion identified. ;Assessed functional status.    Chronic Pain > 80 MEDD -                 Phoebe Perrin, DO

## 2020-11-16 NOTE — TELEPHONE ENCOUNTER
Please call the patient's pharmacy ASAP and find out what he needs to get his test strips filled for 4 times a day and BRAND NAME. I don't understand what he is needing, a new prescription or a PA. He is upset, he states he will call the \"higher ups\" if someone doesn't get him his test strips.

## 2020-11-18 RX ORDER — CALCIUM CITRATE/VITAMIN D3 200MG-6.25
TABLET ORAL
Qty: 200 STRIP | Refills: 5 | OUTPATIENT
Start: 2020-11-18

## 2020-12-17 ENCOUNTER — OFFICE VISIT (OUTPATIENT)
Dept: FAMILY MEDICINE CLINIC | Age: 65
End: 2020-12-17
Payer: COMMERCIAL

## 2020-12-17 ENCOUNTER — TELEPHONE (OUTPATIENT)
Dept: FAMILY MEDICINE CLINIC | Age: 65
End: 2020-12-17

## 2020-12-17 VITALS
HEIGHT: 71 IN | BODY MASS INDEX: 26.8 KG/M2 | TEMPERATURE: 98.3 F | DIASTOLIC BLOOD PRESSURE: 74 MMHG | SYSTOLIC BLOOD PRESSURE: 134 MMHG | WEIGHT: 191.4 LBS

## 2020-12-17 PROCEDURE — 99213 OFFICE O/P EST LOW 20 MIN: CPT | Performed by: FAMILY MEDICINE

## 2020-12-17 PROCEDURE — G8427 DOCREV CUR MEDS BY ELIG CLIN: HCPCS | Performed by: FAMILY MEDICINE

## 2020-12-17 PROCEDURE — G8484 FLU IMMUNIZE NO ADMIN: HCPCS | Performed by: FAMILY MEDICINE

## 2020-12-17 PROCEDURE — 2022F DILAT RTA XM EVC RTNOPTHY: CPT | Performed by: FAMILY MEDICINE

## 2020-12-17 PROCEDURE — 90688 IIV4 VACCINE SPLT 0.5 ML IM: CPT | Performed by: FAMILY MEDICINE

## 2020-12-17 PROCEDURE — 4040F PNEUMOC VAC/ADMIN/RCVD: CPT | Performed by: FAMILY MEDICINE

## 2020-12-17 PROCEDURE — 1036F TOBACCO NON-USER: CPT | Performed by: FAMILY MEDICINE

## 2020-12-17 PROCEDURE — 3017F COLORECTAL CA SCREEN DOC REV: CPT | Performed by: FAMILY MEDICINE

## 2020-12-17 PROCEDURE — 1123F ACP DISCUSS/DSCN MKR DOCD: CPT | Performed by: FAMILY MEDICINE

## 2020-12-17 PROCEDURE — 3044F HG A1C LEVEL LT 7.0%: CPT | Performed by: FAMILY MEDICINE

## 2020-12-17 PROCEDURE — G8417 CALC BMI ABV UP PARAM F/U: HCPCS | Performed by: FAMILY MEDICINE

## 2020-12-17 RX ORDER — ALPRAZOLAM 1 MG/1
TABLET ORAL
Qty: 100 TABLET | Refills: 2 | Status: SHIPPED | OUTPATIENT
Start: 2020-12-17 | End: 2021-02-24 | Stop reason: SDUPTHER

## 2020-12-17 RX ORDER — ATORVASTATIN CALCIUM 20 MG/1
20 TABLET, FILM COATED ORAL DAILY
Qty: 30 TABLET | Refills: 3 | Status: SHIPPED | OUTPATIENT
Start: 2020-12-17 | End: 2021-04-22 | Stop reason: SDUPTHER

## 2020-12-17 RX ORDER — ATORVASTATIN CALCIUM 20 MG/1
20 TABLET, FILM COATED ORAL DAILY
Qty: 30 TABLET | Refills: 3 | Status: SHIPPED | OUTPATIENT
Start: 2020-12-17 | End: 2020-12-17 | Stop reason: SDUPTHER

## 2020-12-17 RX ORDER — ALPRAZOLAM 1 MG/1
TABLET ORAL
Qty: 100 TABLET | Refills: 2 | Status: SHIPPED | OUTPATIENT
Start: 2020-12-17 | End: 2020-12-17 | Stop reason: SDUPTHER

## 2020-12-17 RX ORDER — FAMOTIDINE 20 MG/1
20 TABLET, FILM COATED ORAL NIGHTLY PRN
Qty: 30 TABLET | Refills: 2 | Status: SHIPPED | OUTPATIENT
Start: 2020-12-17 | End: 2020-12-17 | Stop reason: SDUPTHER

## 2020-12-17 RX ORDER — FAMOTIDINE 20 MG/1
20 TABLET, FILM COATED ORAL NIGHTLY PRN
Qty: 30 TABLET | Refills: 2 | Status: SHIPPED | OUTPATIENT
Start: 2020-12-17 | End: 2021-04-22 | Stop reason: SDUPTHER

## 2020-12-17 NOTE — PROGRESS NOTES
Subjective:      Patient ID: Jeffry Wheeler Sr. is a 77 y.o. male. HPI   Check up, chroniic anxiety, panic disorder   managed with xanx    Been on meds for years    Attempted wean prior to no swuccess  Will need refills    Endocrinologist for dm2  Seen cardilogist for htn       Lab Results   Component Value Date    LABA1C 6.5 09/08/2020     Lab Results   Component Value Date    .9 09/08/2020     Lab Results   Component Value Date    LABMICR <1.20 08/10/2020     Lab Results   Component Value Date    [address] 53 09/08/2020           Review of Systems   Constitutional: Negative for appetite change, fatigue and unexpected weight change. Eyes: Negative for pain and visual disturbance. Respiratory: Negative for cough, shortness of breath and wheezing. Cardiovascular: Negative for chest pain, palpitations and leg swelling. Gastrointestinal: Negative for abdominal pain. Endocrine: Negative for polyuria. Genitourinary: Negative for difficulty urinating. Neurological: Negative for speech difficulty, numbness and headaches. Psychiatric/Behavioral: Positive for decreased concentration. Negative for confusion and sleep disturbance. The patient is nervous/anxious. A complete 14 point  review of systems was completed; pertinent positives are noted in HPI    Vitals:    12/17/20 1414   BP: (!) 150/70   Temp: 98.3 °F (36.8 °C)   Weight: 191 lb 6.4 oz (86.8 kg)   Height: 5' 10.5\" (1.791 m)     Body mass index is 27.07 kg/m².      Wt Readings from Last 3 Encounters:   12/17/20 191 lb 6.4 oz (86.8 kg)   09/24/20 188 lb 9.6 oz (85.5 kg)   08/06/20 184 lb 9.6 oz (83.7 kg)     BP Readings from Last 3 Encounters:   12/17/20 (!) 150/70   09/24/20 134/80   08/06/20 138/68        BP (!) 150/70   Temp 98.3 °F (36.8 °C)   Ht 5' 10.5\" (1.791 m)   Wt 191 lb 6.4 oz (86.8 kg)   BMI 27.07 kg/m²    /74   Temp 98.3 °F (36.8 °C)   Ht 5' 10.5\" (1.791 m)   Wt 191 lb 6.4 oz (86.8 kg)   BMI 27.07 kg/m² Objective:   Physical Exam  Vitals signs reviewed. Constitutional:       General: He is not in acute distress. Appearance: He is obese. He is not ill-appearing. HENT:      Right Ear: Tympanic membrane normal.      Left Ear: Tympanic membrane normal.      Mouth/Throat:      Mouth: Mucous membranes are moist.   Eyes:      General: No scleral icterus. Conjunctiva/sclera: Conjunctivae normal.   Neck:      Musculoskeletal: Neck supple. Cardiovascular:      Rate and Rhythm: Normal rate and regular rhythm. Pulmonary:      Effort: Pulmonary effort is normal.      Breath sounds: Normal breath sounds. Abdominal:      Palpations: There is no mass. Tenderness: There is no abdominal tenderness. Lymphadenopathy:      Cervical: No cervical adenopathy. Neurological:      General: No focal deficit present. Mental Status: He is alert and oriented to person, place, and time. Psychiatric:         Behavior: Behavior normal.      Comments: Mod to severe anxiety  Thought ramble during discussion         Assessment:      Assessment/Plan:  Mckenzie Reardon was seen today for 3 month follow-up. Diagnoses and all orders for this visit:    Type 2 diabetes mellitus with diabetic nephropathy, unspecified whether long term insulin use (HCC)    Anxiety  -     Discontinue: ALPRAZolam (XANAX) 1 MG tablet; TAKE ONE TABLET BY MOUTH THREE TIMES A DAY AND AS NEEDED  -     ALPRAZolam (XANAX) 1 MG tablet; TAKE ONE TABLET BY MOUTH THREE TIMES A DAY AND AS NEEDED    Gastroesophageal reflux disease without esophagitis  -     Discontinue: famotidine (PEPCID) 20 MG tablet; Take 1 tablet by mouth nightly as needed (abd pain)  -     famotidine (PEPCID) 20 MG tablet; Take 1 tablet by mouth nightly as needed (abd pain)    Mixed hyperlipidemia  -     Discontinue: atorvastatin (LIPITOR) 20 MG tablet; Take 1 tablet by mouth daily  -     atorvastatin (LIPITOR) 20 MG tablet;  Take 1 tablet by mouth daily    Need for influenza vaccination -     INFLUENZA, QUADV, ADJUVANTED, 65 YRS =, IM, PF, PREFILL SYR, 0.5ML (FLUAD)    Other orders  -     Discontinue: triamcinolone (KENALOG) 0.1 % ointment; Apply topically 2 times daily  -     triamcinolone (KENALOG) 0.1 % ointment; Apply topically 2 times daily            Plan: Will compose new letter regarding his anxiety and disability  Refill meds  Discussed my [facility] and likely need to finf new provider outside this office  Controlled Substance Monitoring:    Acute and Chronic Pain Monitoring:   RX Monitoring 12/20/2020   Attestation -   Periodic Controlled Substance Monitoring Possible medication side effects, risk of tolerance/dependence & alternative treatments discussed. ;No signs of potential drug abuse or diversion identified. ;Assessed functional status.    Chronic Pain > 80 MEDD -               Minesh Rivero DO

## 2020-12-20 ASSESSMENT — ENCOUNTER SYMPTOMS
WHEEZING: 0
ABDOMINAL PAIN: 0
COUGH: 0
SHORTNESS OF BREATH: 0
EYE PAIN: 0

## 2020-12-28 NOTE — TELEPHONE ENCOUNTER
Advised patient providers did not feel they could dedicate time required for him at this practice. Patient unhappy with this. Long conversation pt wants to stay with practice and will be reaching out to [facility] to discuss with them.

## 2021-01-04 ENCOUNTER — TELEPHONE (OUTPATIENT)
Dept: FAMILY MEDICINE CLINIC | Age: 66
End: 2021-01-04

## 2021-01-04 NOTE — TELEPHONE ENCOUNTER
Patient called me today at my office to express a complaint with the [facility] and the office. He said he was told that he cannot schedule an appt at the [facility] office since Dr. Zenon Dasilva is retiring. He  would like to stay at [facility] and see Dr. Pa Higgins. He also said that when he was in the office to see Dr. Zenon Dasilva on 12/17 he gave him disability forms and he would like Dr. Zenon Dasilva to complete them as he did in the past. I told him I would need to investigate and that I would get back to him this week at [phone].

## 2021-01-05 NOTE — TELEPHONE ENCOUNTER
Called patient back today after talking to Dr. Franci Palacio, Dr. Francisco Stanley and the practice manager. Dr. Franci Palacio did his disability letter for him today and it was put in the mail, I did let the patient know that. Dr. Francisco Stanley has agreed to see the patient. I had Dr. Franci Lucero MA call the patient today to make that appointment. He did state that he wanted all his meds the same as Dr. Franci Palacio prescribed and his treatment the same as Dr. Franci Palacio. I informed the patient that he would have to discuss that with Dr. Francisco Stanley at the time of his visit.

## 2021-01-06 ENCOUNTER — TELEPHONE (OUTPATIENT)
Dept: FAMILY MEDICINE CLINIC | Age: 66
End: 2021-01-06

## 2021-01-19 ENCOUNTER — TELEPHONE (OUTPATIENT)
Dept: ENDOCRINOLOGY | Age: 66
End: 2021-01-19

## 2021-01-19 DIAGNOSIS — E11.21 TYPE 2 DIABETES MELLITUS WITH DIABETIC NEPHROPATHY, UNSPECIFIED WHETHER LONG TERM INSULIN USE (HCC): ICD-10-CM

## 2021-01-19 RX ORDER — CALCIUM CITRATE/VITAMIN D3 200MG-6.25
TABLET ORAL
Qty: 150 STRIP | Refills: 3 | Status: SHIPPED | OUTPATIENT
Start: 2021-01-19 | End: 2021-02-04 | Stop reason: SDUPTHER

## 2021-01-19 RX ORDER — GLUCOSAM/CHON-MSM1/C/MANG/BOSW 500-416.6
TABLET ORAL
Qty: 150 EACH | Refills: 5 | Status: SHIPPED | OUTPATIENT
Start: 2021-01-19 | End: 2021-02-04 | Stop reason: SDUPTHER

## 2021-01-19 NOTE — TELEPHONE ENCOUNTER
Patient needs a refill on his true metrix blood glucose strips and lancets- does not want generic. He tests 4 times a day. [facility] [phone] - F [phone]    He stated the strips will need a PA     This patient is very upset because this is always messed up.  I was on the phone for 30 minutes with the patient regarding this matter

## 2021-01-19 NOTE — TELEPHONE ENCOUNTER
Submitted PA and insurance stated:    Prior Authorization duplicate/approved    There is an approval on file scanned in media for 100 strips per 25 days.

## 2021-01-20 ENCOUNTER — TELEPHONE (OUTPATIENT)
Dept: FAMILY MEDICINE CLINIC | Age: 66
End: 2021-01-20

## 2021-01-20 NOTE — TELEPHONE ENCOUNTER
Terrie calling for med clarification on the Xanax. States the directions state 3 times a day and as needed. Does this mean patient can have more than 3 tablets a day?

## 2021-01-21 ENCOUNTER — TELEPHONE (OUTPATIENT)
Dept: FAMILY MEDICINE CLINIC | Age: 66
End: 2021-01-21

## 2021-01-21 NOTE — TELEPHONE ENCOUNTER
Dorie from [facility] is calling stating we need a new script written    Needs 100 pills for 25 days of xanax 1 mg   Fax # [phone]

## 2021-02-04 ENCOUNTER — OFFICE VISIT (OUTPATIENT)
Dept: ENDOCRINOLOGY | Age: 66
End: 2021-02-04
Payer: COMMERCIAL

## 2021-02-04 VITALS
DIASTOLIC BLOOD PRESSURE: 70 MMHG | HEART RATE: 76 BPM | BODY MASS INDEX: 26.54 KG/M2 | SYSTOLIC BLOOD PRESSURE: 142 MMHG | OXYGEN SATURATION: 97 % | HEIGHT: 71 IN | WEIGHT: 189.6 LBS | RESPIRATION RATE: 18 BRPM

## 2021-02-04 DIAGNOSIS — I10 ESSENTIAL HYPERTENSION: ICD-10-CM

## 2021-02-04 DIAGNOSIS — E11.21 TYPE 2 DIABETES MELLITUS WITH DIABETIC NEPHROPATHY, UNSPECIFIED WHETHER LONG TERM INSULIN USE (HCC): Primary | ICD-10-CM

## 2021-02-04 LAB — HBA1C MFR BLD: 6.4 %

## 2021-02-04 PROCEDURE — 4040F PNEUMOC VAC/ADMIN/RCVD: CPT | Performed by: INTERNAL MEDICINE

## 2021-02-04 PROCEDURE — G8427 DOCREV CUR MEDS BY ELIG CLIN: HCPCS | Performed by: INTERNAL MEDICINE

## 2021-02-04 PROCEDURE — 3044F HG A1C LEVEL LT 7.0%: CPT | Performed by: INTERNAL MEDICINE

## 2021-02-04 PROCEDURE — 1036F TOBACCO NON-USER: CPT | Performed by: INTERNAL MEDICINE

## 2021-02-04 PROCEDURE — 3017F COLORECTAL CA SCREEN DOC REV: CPT | Performed by: INTERNAL MEDICINE

## 2021-02-04 PROCEDURE — G8417 CALC BMI ABV UP PARAM F/U: HCPCS | Performed by: INTERNAL MEDICINE

## 2021-02-04 PROCEDURE — 83036 HEMOGLOBIN GLYCOSYLATED A1C: CPT | Performed by: INTERNAL MEDICINE

## 2021-02-04 PROCEDURE — 99213 OFFICE O/P EST LOW 20 MIN: CPT | Performed by: INTERNAL MEDICINE

## 2021-02-04 PROCEDURE — 2022F DILAT RTA XM EVC RTNOPTHY: CPT | Performed by: INTERNAL MEDICINE

## 2021-02-04 PROCEDURE — G8484 FLU IMMUNIZE NO ADMIN: HCPCS | Performed by: INTERNAL MEDICINE

## 2021-02-04 PROCEDURE — 1123F ACP DISCUSS/DSCN MKR DOCD: CPT | Performed by: INTERNAL MEDICINE

## 2021-02-04 RX ORDER — GLUCOSAM/CHON-MSM1/C/MANG/BOSW 500-416.6
TABLET ORAL
Qty: 100 EACH | Refills: 5 | Status: SHIPPED | OUTPATIENT
Start: 2021-02-04 | End: 2021-02-04 | Stop reason: SDUPTHER

## 2021-02-04 RX ORDER — GLUCOSAM/CHON-MSM1/C/MANG/BOSW 500-416.6
TABLET ORAL
Qty: 100 EACH | Refills: 5 | Status: SHIPPED | OUTPATIENT
Start: 2021-02-04 | End: 2021-07-09

## 2021-02-04 RX ORDER — CALCIUM CITRATE/VITAMIN D3 200MG-6.25
TABLET ORAL
Qty: 100 STRIP | Refills: 5 | Status: SHIPPED | OUTPATIENT
Start: 2021-02-04 | End: 2021-02-04 | Stop reason: SDUPTHER

## 2021-02-04 RX ORDER — CALCIUM CITRATE/VITAMIN D3 200MG-6.25
TABLET ORAL
Qty: 100 STRIP | Refills: 5 | Status: SHIPPED | OUTPATIENT
Start: 2021-02-04 | End: 2021-02-12 | Stop reason: SDUPTHER

## 2021-02-04 RX ORDER — CALCIUM CITRATE/VITAMIN D3 200MG-6.25
TABLET ORAL
Qty: 150 STRIP | Refills: 3 | Status: SHIPPED | OUTPATIENT
Start: 2021-02-04 | End: 2021-02-04 | Stop reason: SDUPTHER

## 2021-02-04 NOTE — PROGRESS NOTES
Seen as f/u patient for diabetes    Interim:  Glucose stable  No issues  Forgot log  Strips not covered  Paid out of pocket  Checked PA, there is an approval in system    Reports issues with Lipitor, muscle aches    Diagnosed with Type 2 diabetes mellitus 5 years  Known diabetic complications: Nephropathy( cause?)     Current diabetic medications   onglyza 5mg-  Off of it as BG improved  GLipizide - stopped as had hypoglycemia- BG 38    Mild, controlled    Off metformin, has CKD    Last A1c 6.4%<-----6.6%<------6.4%<------6.9%<---- 6.8%<-----6.4% <------6.6%<----6.5%<-----5.9 on 3/17<------6.1 on 12/16<------- 6.2% on 8/16 <------- 6.8 on 5/16 <--- 10.1<----- 7.5 on 8/15<--- 9.4 <---- 7.9    Prior visit with dietician: No  Current diet: on average, 3 meals per day  Started to restrict CHO  Current exercise: Walking  Current monitoring regimen: home blood tests 3-4  times daily     Has brought blood glucose log/meter:no  Home blood sugar records: ----  Any episodes of hypoglycemia? no    No Hx of CAD , PVD, CVA    Hyperlipidemia:stable, tolerating Zocor 20mg   on 1/13    LDL 72 on 9/17   on 12/18  LDL 57 on 12/19  lipitor 40mg  LDL 55 on 9/20    H/o crestor use-reports side effects    Repatha was denied per patient    Last eye exam: 9/20  Last foot exam: 12/19  Last microalbumin to creatinine ratio: 9/17   Cr 1.9 on 3/16---> 1.5 on 8/16---> 1.4 on 10/16--> 1.2 on 6/18--> 1.3    HTN: Stable, lisinopril 10mg- off it due to low BP    He has arthritis, he was inquiring about steroids and effect on DM    Past Medical History:   Diagnosis Date    Anxiety     HTN (hypertension)     Hyperlipemia     Panic disorder     Type II or unspecified type diabetes mellitus without mention of complication, not stated as uncontrolled     Umbilical hernia      Past Surgical History:   Procedure Laterality Date    CHOLECYSTECTOMY, LAPAROSCOPIC  09/16/2016    with gram    NASAL POLYP SURGERY      SINUS SURGERY  TONSILLECTOMY      UMBILICAL HERNIA REPAIR  09/16/2016     Current Outpatient Medications   Medication Sig Dispense Refill    blood glucose test strips (TRUE METRIX BLOOD GLUCOSE TEST) strip USE TO TEST 4 TIMES DAILY AS NEEDED 150 strip 3    TRUEplus Lancets 33G MISC USE FOUR TIMES A  each 5    ALPRAZolam (XANAX) 1 MG tablet TAKE ONE TABLET BY MOUTH THREE TIMES A DAY AND AS NEEDED 100 tablet 2    famotidine (PEPCID) 20 MG tablet Take 1 tablet by mouth nightly as needed (abd pain) 30 tablet 2    atorvastatin (LIPITOR) 20 MG tablet Take 1 tablet by mouth daily 30 tablet 3    triamcinolone (KENALOG) 0.1 % ointment Apply topically 2 times daily 80 g 1    ULORIC 40 MG TABS tablet TAKE ONE TABLET BY MOUTH DAILY 90 tablet 1    Wound Dressings (MEDIHONEY) GEL gel Apply 1 each topically as needed (use in affected area) 1 Tube 2    colchicine (COLCRYS) 0.6 MG tablet Take 1 tab po BID 14 tablet 0    colchicine (COLCRYS) 0.6 MG tablet Take 1 tab po every 3 days 30 tablet 1    oxyCODONE (ROXICODONE) 5 MG immediate release tablet Take 5 mg by mouth every 4 hours as needed for Pain .  Blood Glucose Monitoring Suppl (TRUE METRIX METER) W/DEVICE KIT 1 each by Does not apply route 4 times daily 1 kit 0    aspirin 325 MG EC tablet Take 325 mg by mouth daily       No current facility-administered medications for this visit. Review of Systems  Constitutional: Negative for weight loss and malaise/fatigue. Negative for fever and chills. HENT: Negative for hearing loss, ear pain, nosebleeds, neck pain and tinnitus. Eyes: Negative for blurred vision. Negative for double vision, photophobia and pain. Respiratory: Negative for cough and sputum production. Cardiovascular: + for chest pain,- palpitations and leg swelling. Gastrointestinal: Negative for nausea, vomiting and abdominal pain. Genitourinary: Negative for dysuria, urgency and frequency. Musculoskeletal: Negative for back pain. No joint pain  Skin: Negative for itching and +rash. Neurological: Negative for dizziness. Negative for tingling, tremors, focal weakness and headaches. Endo/Heme/Allergies: see HPI  Psychiatric/Behavioral: Negative for depression and substance abuse. Objective:     Vitals:    02/04/21 1710   BP: (!) 142/70   Pulse: 76   Resp: 18   SpO2: 97%          There were no vitals taken for this visit. Wt Readings from Last 3 Encounters:   12/17/20 191 lb 6.4 oz (86.8 kg)   09/24/20 188 lb 9.6 oz (85.5 kg)   08/06/20 184 lb 9.6 oz (83.7 kg)     Constitutional: Well-developed, appears stated age, cooperative, in no acute distress  H/E/N/M/T:atraumatic, normocephalic, external ears, nose, lips normal without lesions  Eyes: Lids, lashes, conjunctivae and sclerae normal, No proptosis, no redness  Neck: supple, symmetrical, no swelling  Skin: No obvious rashes or lesions present.   Skin and hair texture normal  Psychiatric: Judgement and Insight:  judgement and insight appear normal  Neuro: Normal without focal findings, speech is normal normal, speech is spontaneous  Chest: No labored breathing, no chest deformity, no stridor  Musculoskeletal: No joint deformity, swelling      8/18  Skeletal foot exam is normal, no skin lesions, toenails are normal, 10 g monofilament is detected , DP palpable, Callus+      Lab Reviewed   No components found for: CHLPL  Lab Results   Component Value Date    TRIG 60 09/08/2020    TRIG 67 12/13/2019    TRIG 85 12/18/2018     Lab Results   Component Value Date    HDL 44 09/08/2020    HDL 42 12/13/2019    HDL 43 12/18/2018     Lab Results   Component Value Date    LDLCALC 55 09/08/2020    LDLCALC 57 12/13/2019    LDLCALC 103 (H) 12/18/2018     Lab Results   Component Value Date    LABVLDL 12 09/08/2020    LABVLDL 13 12/13/2019    LABVLDL 17 12/18/2018     Lab Results   Component Value Date    LABA1C 6.5 09/08/2020       Assessment: Samia Pappas . is a 77 y.o. male with :    1.T2DM: Longstanding, controlled, has CKD, need to avoid sensitizer and SGLT-2 inhibitor. Had low with Glipizide,Off DPP IV given lows, has lost weight, dietary changes, Blood glucose improved, A1c at goal, re enforced changes made by patient. He has not been exercising much, advised walking /life style change. 2.HTN: BP near goal , off medication  3. HLD: LDL at goal but statin intolerance, muscle aches. PCSK-9 inhibitor not covered per patient. He will be seeing cardiology  Takes Vit D 1000 IU , vit D 38, switched to D2, did not help  Can go back to D3  4. CKDIII: saw Nephrology, Cr improved  5. Gout  6. Panic Disorder  7. ED: Libido is good, nl testosterone, as he would like evaluated but concerned about side effects, discussed it may not help ED much, advised see urology      Plan:      Hold metformin and onglyza   Advise to check blood sugar 1 times a day but he would like to monitor more frequently as helped controlled- recommend TID                                                               Patient to send blood sugar log for titration. Advise to exercise regularly. Advise to low simple carbohydrate and protein with each  meal diet. 40gm of CHO meal per day   Diabetes Care: recommend yearly eye exam, foot exam and urine microalbumin to   creatinine ratio.      -Hyperlipidemia, LDL goal is <100 mg/dl   -Hypertension: Continue same  -Daily ASA: 81mg  -Smoking status: non smoker

## 2021-02-12 DIAGNOSIS — E11.21 TYPE 2 DIABETES MELLITUS WITH DIABETIC NEPHROPATHY, UNSPECIFIED WHETHER LONG TERM INSULIN USE (HCC): ICD-10-CM

## 2021-02-12 RX ORDER — CALCIUM CITRATE/VITAMIN D3 200MG-6.25
TABLET ORAL
Qty: 100 STRIP | Refills: 5 | Status: SHIPPED | OUTPATIENT
Start: 2021-02-12 | End: 2021-07-09

## 2021-02-12 NOTE — TELEPHONE ENCOUNTER
Medication:   Requested Prescriptions     Pending Prescriptions Disp Refills    blood glucose test strips (TRUE METRIX BLOOD GLUCOSE TEST) strip 100 strip 5     Sig: USE TO TEST 3-4 TIMES DAILY AS NEEDED         Last appt: 08/06/2020  Next appt: Visit date not found    Last Labs DM:   Lab Results   Component Value Date    LABA1C 6.4 02/04/2021

## 2021-02-24 DIAGNOSIS — F41.9 ANXIETY: ICD-10-CM

## 2021-02-24 RX ORDER — ALPRAZOLAM 1 MG/1
TABLET ORAL
Qty: 100 TABLET | Refills: 1 | Status: SHIPPED | OUTPATIENT
Start: 2021-02-24 | End: 2021-04-22 | Stop reason: SDUPTHER

## 2021-03-04 ENCOUNTER — OFFICE VISIT (OUTPATIENT)
Dept: RHEUMATOLOGY | Age: 66
End: 2021-03-04
Payer: COMMERCIAL

## 2021-03-04 ENCOUNTER — HOSPITAL ENCOUNTER (OUTPATIENT)
Age: 66
Discharge: HOME | End: 2021-03-04
Payer: COMMERCIAL

## 2021-03-04 ENCOUNTER — HOSPITAL ENCOUNTER (OUTPATIENT)
Dept: GENERAL RADIOLOGY | Age: 66
Discharge: HOME | End: 2021-03-04
Payer: COMMERCIAL

## 2021-03-04 VITALS — BODY MASS INDEX: 27.06 KG/M2 | TEMPERATURE: 97.7 F | HEIGHT: 70 IN | WEIGHT: 189 LBS

## 2021-03-04 DIAGNOSIS — G89.29 CHRONIC PAIN OF BOTH KNEES: ICD-10-CM

## 2021-03-04 DIAGNOSIS — M25.562 CHRONIC PAIN OF BOTH KNEES: ICD-10-CM

## 2021-03-04 DIAGNOSIS — M1A.09X0 IDIOPATHIC CHRONIC GOUT OF MULTIPLE SITES WITHOUT TOPHUS: Primary | ICD-10-CM

## 2021-03-04 DIAGNOSIS — M25.561 CHRONIC PAIN OF BOTH KNEES: ICD-10-CM

## 2021-03-04 DIAGNOSIS — M1A.09X0 IDIOPATHIC CHRONIC GOUT OF MULTIPLE SITES WITHOUT TOPHUS: ICD-10-CM

## 2021-03-04 PROCEDURE — 73562 X-RAY EXAM OF KNEE 3: CPT

## 2021-03-04 PROCEDURE — G8427 DOCREV CUR MEDS BY ELIG CLIN: HCPCS | Performed by: INTERNAL MEDICINE

## 2021-03-04 PROCEDURE — 4040F PNEUMOC VAC/ADMIN/RCVD: CPT | Performed by: INTERNAL MEDICINE

## 2021-03-04 PROCEDURE — 99214 OFFICE O/P EST MOD 30 MIN: CPT | Performed by: INTERNAL MEDICINE

## 2021-03-04 PROCEDURE — 1036F TOBACCO NON-USER: CPT | Performed by: INTERNAL MEDICINE

## 2021-03-04 PROCEDURE — G8484 FLU IMMUNIZE NO ADMIN: HCPCS | Performed by: INTERNAL MEDICINE

## 2021-03-04 PROCEDURE — G8417 CALC BMI ABV UP PARAM F/U: HCPCS | Performed by: INTERNAL MEDICINE

## 2021-03-04 PROCEDURE — 3017F COLORECTAL CA SCREEN DOC REV: CPT | Performed by: INTERNAL MEDICINE

## 2021-03-04 PROCEDURE — 1123F ACP DISCUSS/DSCN MKR DOCD: CPT | Performed by: INTERNAL MEDICINE

## 2021-03-05 LAB
ALBUMIN SERPL-MCNC: 4.5 G/DL (ref 3.4–5)
ALP BLD-CCNC: 69 U/L (ref 40–129)
ALT SERPL-CCNC: 20 U/L (ref 10–40)
AST SERPL-CCNC: 23 U/L (ref 15–37)
BILIRUB SERPL-MCNC: 0.9 MG/DL (ref 0–1)
BILIRUBIN DIRECT: <0.2 MG/DL (ref 0–0.3)
BILIRUBIN, INDIRECT: NORMAL MG/DL (ref 0–1)
TOTAL PROTEIN: 7.7 G/DL (ref 6.4–8.2)
URIC ACID, SERUM: 2.8 MG/DL (ref 3.5–7.2)

## 2021-03-05 NOTE — PROGRESS NOTES
[address] Luciano MD                                                                                                                [address] [address] [phone] ([phone]      Primary provider: Johana Rowe DO  Patient identification: Edu Fuentes Sr. ,: 1955,Sex: male         ASSESSMENT/PLAN:  India Hamilton was seen today for follow-up and gout. Diagnoses and all orders for this visit:    Idiopathic chronic gout of multiple sites without tophus  -     Uric Acid; Future  -     Hepatic Function Panel; Future  -     Creatinine; Future    Chronic pain of both knees  -     XR KNEE LEFT (3 VIEWS); Future  -     XR KNEE RIGHT (3 VIEWS); Future      Polyarticular gout-in remission on 40 mg of Uloric. Check safety labs. Worsening pain in both knees-desires synvisc injection. Check weightbearing x-ray to evaluate the degree of osteoarthritis. He does have moderate osteoarthritis in his hip that might be causing lower extremity discomfort as well. Contact dermatitis-improved. Recommend moisturizer. Follow-up in 6 months. Patient indicates understanding and agrees with the management plan. I reviewed patients medical information and medical history in the medical records. Note is transcribed using voice recognition software. Inadvertent computerized transcription errors may be present. ##################################################################    Subjective: Follow-up for polyarticular gout and generalized osteoarthritis. He has been experiencing more pain and weakness in the lower extremities, states that sometimes it feels as if his legs are going to give out. No swollen inflamed joints. No gout flares. Tolerating medications well.   He heard about viscosupplementation, wondering if that can be done and would help with his arthritis. Rest of ROS negative. Current Outpatient Medications   Medication Sig Dispense Refill    ALPRAZolam (XANAX) 1 MG tablet TAKE ONE TABLET BY MOUTH THREE TIMES A DAY AND AS NEEDED 100 tablet 1    blood glucose test strips (TRUE METRIX BLOOD GLUCOSE TEST) strip USE TO TEST 3 TIMES DAILY FOR BLOOD GLUCOSE MONITORING 100 strip 5    TRUEplus Lancets 33G MISC USE three to  FOUR TIMES A  each 5    famotidine (PEPCID) 20 MG tablet Take 1 tablet by mouth nightly as needed (abd pain) 30 tablet 2    atorvastatin (LIPITOR) 20 MG tablet Take 1 tablet by mouth daily 30 tablet 3    triamcinolone (KENALOG) 0.1 % ointment Apply topically 2 times daily 80 g 1    ULORIC 40 MG TABS tablet TAKE ONE TABLET BY MOUTH DAILY 90 tablet 1    Wound Dressings (MEDIHONEY) GEL gel Apply 1 each topically as needed (use in affected area) 1 Tube 2    colchicine (COLCRYS) 0.6 MG tablet Take 1 tab po every 3 days 30 tablet 1    oxyCODONE (ROXICODONE) 5 MG immediate release tablet Take 5 mg by mouth every 4 hours as needed for Pain .  Blood Glucose Monitoring Suppl (TRUE METRIX METER) W/DEVICE KIT 1 each by Does not apply route 4 times daily 1 kit 0    aspirin 325 MG EC tablet Take 325 mg by mouth daily       No current facility-administered medications for this visit. Allergies   Allergen Reactions    Cephalexin     Levofloxacin     Penicillins     Succinylcholine      Does not want. Pt son had reaction to Succs and almost , pt was told not to ever take Succs.  Sulfa Antibiotics     Tetracyclines & Related     Verapamil     Vicodin [Hydrocodone-Acetaminophen]      Rash; ithching         OBJECTIVE  Physical    Vitals:    21 1453   Temp: 97.7 °F (36.5 °C)       General appearance/psychiatric: Well nourished and well groomed, normal judgment. Alert, appears stated age and cooperative.    MKS: Osteoarthritic changes noted in his scattered finger joints, knees and hips. No focally tender, swollen or inflamed joints in upper or lower extremities, ROS limited abduction hips, otherwise full range of motion. Skin: Eczematous lesions in his hands are much better than last time. Data:  Lab Results   Component Value Date    WBC 6.5 08/08/2019    RBC 4.64 08/08/2019    HGB 14.5 08/08/2019    HCT 43.1 08/08/2019     08/08/2019    MCV 93.0 08/08/2019    MCH 31.3 08/08/2019    MCHC 33.7 08/08/2019    RDW 12.8 08/08/2019    LYMPHOPCT 31.5 08/08/2019    MONOPCT 8.1 08/08/2019    BASOPCT 0.5 08/08/2019    MONOSABS 0.5 08/08/2019    LYMPHSABS 2.0 08/08/2019    EOSABS 0.4 08/08/2019    BASOSABS 0.0 08/08/2019       Chemistry        Component Value Date/Time     12/13/2019 1820    K 4.5 12/13/2019 1820     12/13/2019 1820    CO2 25 12/13/2019 1820    BUN 31 (H) 12/13/2019 1820    CREATININE 1.3 12/13/2019 1820        Component Value Date/Time    CALCIUM 9.7 12/13/2019 1820    ALKPHOS 62 12/13/2019 1820    AST 27 09/08/2020 1018    ALT 26 09/08/2020 1018    BILITOT 1.4 (H) 12/13/2019 1820          Lab Results   Component Value Date    SEDRATE 14 08/18/2016        I thank you for giving me the opportunity to be involved in [address] [place] [address] and I look forward following Fani Khanna along with you. If you have any questions or concerns please feel free to contact me at any time.     Roxane Hall MD 03/04/21

## 2021-03-23 DIAGNOSIS — I10 ESSENTIAL HYPERTENSION: ICD-10-CM

## 2021-03-23 DIAGNOSIS — E78.2 MIXED HYPERLIPIDEMIA: ICD-10-CM

## 2021-03-23 LAB
ALT SERPL-CCNC: 23 U/L (ref 10–40)
ANION GAP SERPL CALCULATED.3IONS-SCNC: 13 MMOL/L (ref 3–16)
AST SERPL-CCNC: 23 U/L (ref 15–37)
BUN BLDV-MCNC: 37 MG/DL (ref 7–20)
CALCIUM SERPL-MCNC: 9.5 MG/DL (ref 8.3–10.6)
CHLORIDE BLD-SCNC: 109 MMOL/L (ref 99–110)
CHOLESTEROL, TOTAL: 124 MG/DL (ref 0–199)
CO2: 23 MMOL/L (ref 21–32)
CREAT SERPL-MCNC: 1.7 MG/DL (ref 0.8–1.3)
GFR AFRICAN AMERICAN: 49
GFR NON-AFRICAN AMERICAN: 40
GLUCOSE BLD-MCNC: 116 MG/DL (ref 70–99)
HDLC SERPL-MCNC: 47 MG/DL (ref 40–60)
LDL CHOLESTEROL CALCULATED: 68 MG/DL
POTASSIUM SERPL-SCNC: 4.9 MMOL/L (ref 3.5–5.1)
SODIUM BLD-SCNC: 145 MMOL/L (ref 136–145)
TRIGL SERPL-MCNC: 46 MG/DL (ref 0–150)
VLDLC SERPL CALC-MCNC: 9 MG/DL

## 2021-03-26 ENCOUNTER — TELEPHONE (OUTPATIENT)
Dept: FAMILY MEDICINE CLINIC | Age: 66
End: 2021-03-26

## 2021-03-26 NOTE — TELEPHONE ENCOUNTER
Patient is calling wanting to know if there is a blood test that we can test for to detect succinylcholine ?  He is supposed to have a covid shot next week and he wanted to see if there was a way to test for succinylcholine please advise

## 2021-03-26 NOTE — TELEPHONE ENCOUNTER
The patient should be able to have the vaccination unless the following apply;       . Severe allergic reaction (e.g., anaphylaxis) after a previous dose or to component of the COVID-19 vaccine   . Immediate allergic reaction of any severity to a previous dose or known (diagnosed) allergy to a component of the vaccine      Also I don't test for Succinylcholine.      Dr. Yinka Dunbar

## 2021-03-29 ENCOUNTER — TELEPHONE (OUTPATIENT)
Dept: FAMILY MEDICINE CLINIC | Age: 66
End: 2021-03-29

## 2021-03-30 ENCOUNTER — TELEPHONE (OUTPATIENT)
Dept: FAMILY MEDICINE CLINIC | Age: 66
End: 2021-03-30

## 2021-03-30 DIAGNOSIS — N18.30 CHRONIC RENAL FAILURE IN PEDIATRIC PATIENT, STAGE 3 (MODERATE) (HCC): Primary | ICD-10-CM

## 2021-03-30 NOTE — TELEPHONE ENCOUNTER
Patient's creatinine is 1.7 higher than I have seen before. He needs to avoid NSAIDS and push fluids. He will be referred to a Nephrologist to monitor his renal function from this point on. Referral has been placed. His cholesterol is wnl.

## 2021-03-31 NOTE — TELEPHONE ENCOUNTER
To monitor his renal function.   His creatinine is 1.7 I don't know the  patient so I have referred him to a Nephrologist.  Thank you, Dr. Mirtha Aponte

## 2021-03-31 NOTE — TELEPHONE ENCOUNTER
Pt called and was given the info from Dr Ashli Appiah to see a nephrology doc. Pt states that he does not want to see any other doctor until he sees Dr Ashli Appiah.

## 2021-04-06 ENCOUNTER — TELEPHONE (OUTPATIENT)
Dept: FAMILY MEDICINE CLINIC | Age: 66
End: 2021-04-06

## 2021-04-22 ENCOUNTER — OFFICE VISIT (OUTPATIENT)
Dept: FAMILY MEDICINE CLINIC | Age: 66
End: 2021-04-22
Payer: COMMERCIAL

## 2021-04-22 VITALS
SYSTOLIC BLOOD PRESSURE: 142 MMHG | WEIGHT: 195.2 LBS | BODY MASS INDEX: 27.33 KG/M2 | HEIGHT: 71 IN | DIASTOLIC BLOOD PRESSURE: 80 MMHG

## 2021-04-22 DIAGNOSIS — Z76.89 ENCOUNTER TO ESTABLISH CARE: ICD-10-CM

## 2021-04-22 DIAGNOSIS — E11.21 TYPE 2 DIABETES MELLITUS WITH DIABETIC NEPHROPATHY, UNSPECIFIED WHETHER LONG TERM INSULIN USE (HCC): ICD-10-CM

## 2021-04-22 DIAGNOSIS — F41.9 ANXIETY: ICD-10-CM

## 2021-04-22 DIAGNOSIS — K21.9 GASTROESOPHAGEAL REFLUX DISEASE WITHOUT ESOPHAGITIS: ICD-10-CM

## 2021-04-22 DIAGNOSIS — E78.2 MIXED HYPERLIPIDEMIA: ICD-10-CM

## 2021-04-22 DIAGNOSIS — Z12.11 COLON CANCER SCREENING: Primary | ICD-10-CM

## 2021-04-22 LAB
ANION GAP SERPL CALCULATED.3IONS-SCNC: 13 MMOL/L (ref 3–16)
BUN BLDV-MCNC: 33 MG/DL (ref 7–20)
CALCIUM SERPL-MCNC: 9.1 MG/DL (ref 8.3–10.6)
CHLORIDE BLD-SCNC: 105 MMOL/L (ref 99–110)
CO2: 24 MMOL/L (ref 21–32)
CREAT SERPL-MCNC: 1.5 MG/DL (ref 0.8–1.3)
GFR AFRICAN AMERICAN: 56
GFR NON-AFRICAN AMERICAN: 47
GLUCOSE BLD-MCNC: 132 MG/DL (ref 70–99)
POTASSIUM SERPL-SCNC: 5.1 MMOL/L (ref 3.5–5.1)
SODIUM BLD-SCNC: 142 MMOL/L (ref 136–145)

## 2021-04-22 PROCEDURE — 99214 OFFICE O/P EST MOD 30 MIN: CPT | Performed by: FAMILY MEDICINE

## 2021-04-22 PROCEDURE — 3044F HG A1C LEVEL LT 7.0%: CPT | Performed by: FAMILY MEDICINE

## 2021-04-22 PROCEDURE — G8417 CALC BMI ABV UP PARAM F/U: HCPCS | Performed by: FAMILY MEDICINE

## 2021-04-22 PROCEDURE — 36415 COLL VENOUS BLD VENIPUNCTURE: CPT | Performed by: FAMILY MEDICINE

## 2021-04-22 PROCEDURE — 4040F PNEUMOC VAC/ADMIN/RCVD: CPT | Performed by: FAMILY MEDICINE

## 2021-04-22 PROCEDURE — 3017F COLORECTAL CA SCREEN DOC REV: CPT | Performed by: FAMILY MEDICINE

## 2021-04-22 PROCEDURE — 1036F TOBACCO NON-USER: CPT | Performed by: FAMILY MEDICINE

## 2021-04-22 PROCEDURE — 1123F ACP DISCUSS/DSCN MKR DOCD: CPT | Performed by: FAMILY MEDICINE

## 2021-04-22 PROCEDURE — 2022F DILAT RTA XM EVC RTNOPTHY: CPT | Performed by: FAMILY MEDICINE

## 2021-04-22 PROCEDURE — G8427 DOCREV CUR MEDS BY ELIG CLIN: HCPCS | Performed by: FAMILY MEDICINE

## 2021-04-22 RX ORDER — ATORVASTATIN CALCIUM 20 MG/1
20 TABLET, FILM COATED ORAL DAILY
Qty: 30 TABLET | Refills: 3 | Status: CANCELLED | OUTPATIENT
Start: 2021-04-22

## 2021-04-22 RX ORDER — FAMOTIDINE 20 MG/1
20 TABLET, FILM COATED ORAL NIGHTLY PRN
Qty: 30 TABLET | Refills: 2 | Status: SHIPPED | OUTPATIENT
Start: 2021-04-22 | End: 2021-09-29 | Stop reason: SDUPTHER

## 2021-04-22 RX ORDER — ALPRAZOLAM 1 MG/1
TABLET ORAL
Qty: 100 TABLET | Refills: 1 | Status: CANCELLED | OUTPATIENT
Start: 2021-04-22 | End: 2021-06-22

## 2021-04-22 RX ORDER — ATORVASTATIN CALCIUM 20 MG/1
20 TABLET, FILM COATED ORAL DAILY
Qty: 30 TABLET | Refills: 3 | Status: SHIPPED | OUTPATIENT
Start: 2021-04-22 | End: 2021-08-23 | Stop reason: SDUPTHER

## 2021-04-22 RX ORDER — ALPRAZOLAM 1 MG/1
TABLET ORAL
Qty: 90 TABLET | Refills: 0 | Status: SHIPPED | OUTPATIENT
Start: 2021-04-22 | End: 2021-04-29 | Stop reason: SDUPTHER

## 2021-04-22 RX ORDER — FAMOTIDINE 20 MG/1
20 TABLET, FILM COATED ORAL NIGHTLY PRN
Qty: 30 TABLET | Refills: 2 | Status: CANCELLED | OUTPATIENT
Start: 2021-04-22

## 2021-04-22 RX ORDER — FEBUXOSTAT 40 MG/1
TABLET ORAL
Qty: 90 TABLET | Refills: 1 | Status: CANCELLED | OUTPATIENT
Start: 2021-04-22

## 2021-04-22 NOTE — PROGRESS NOTES
2021    Morris Bello Sr. (:  1955) is a 77 y.o. male, here for evaluation of the following medical concerns:    HPI      Encounter to establish care- patient presented to the clinic to establish care with a new pcp. The patient's previous pcp is retired. The patient is feeling well today and denied a new problem. She has several chronic medical conditions to discuss today. She denied tobacco use, alcohol use, or drug use. DM II- patient is well controlled seeing a specialist for his care. Feels well today, taking medication as prescribed. Chronic renal disease- patient had me check his renal function and found that it had declined. Recommended him seeing a specialist for this. He has not made the appointment up until this time. Will order another BMP;     Psychiatry- patient is taking Xanx 3-4 times a day. I informed him that I do not write this medication on a regular basis and would rather him see a specialist.  He didn't want to follow with a Psychiatrist and stated he has been evaluated by them in the past and is wanting to stay on his medications at this time. Colon cancer screening- patient is needing certain screening including colonoscopy. Today, denied chest pain, sob, n, v, or diarrhea. Review of Systems   Constitutional: Negative for activity change, fatigue, fever and unexpected weight change. HENT: Negative for congestion, facial swelling, postnasal drip, sinus pain and sore throat. Respiratory: Negative for shortness of breath. Cardiovascular: Negative for chest pain, palpitations and leg swelling. Gastrointestinal: Negative for anal bleeding. Genitourinary: Negative for difficulty urinating and dysuria. Musculoskeletal: Positive for arthralgias. Skin: Positive for color change and rash. Neurological: Negative for light-headedness and headaches. Psychiatric/Behavioral: Negative for dysphoric mood. The patient is nervous/anxious.  NASAL POLYP SURGERY      SINUS SURGERY      TONSILLECTOMY      UMBILICAL HERNIA REPAIR  2016       Social History     Socioeconomic History    Marital status:      Spouse name: Not on file    Number of children: Not on file    Years of education: Not on file    Highest education level: Not on file   Occupational History    Occupation: disabled   Social Needs    Financial resource strain: Not on file    Food insecurity     Worry: Not on file     Inability: Not on file   [employer] needs     Medical: Not on file     Non-medical: Not on file   Tobacco Use    Smoking status: Former Smoker     Packs/day: 1.00     Years: 15.00     Pack years: 15.00     Types: Cigarettes     Quit date: 1995     Years since quittin.9    Smokeless tobacco: Never Used   Substance and Sexual Activity    Alcohol use: Yes     Alcohol/week: 0.0 standard drinks     Comment: social    Drug use: No     Comment: denied    Sexual activity: Yes     Partners: Female   Lifestyle    Physical activity     Days per week: Not on file     Minutes per session: Not on file    Stress: Not on file   Relationships    Social connections     Talks on phone: Not on file     Gets together: Not on file     Attends Ten[employer] service: Not on file     Active member of club or organization: Not on file     Attends meetings of clubs or organizations: Not on file     Relationship status: Not on file    Intimate partner violence     Fear of current or ex partner: Not on file     Emotionally abused: Not on file     Physically abused: Not on file     Forced sexual activity: Not on file   Other Topics Concern    Not on file   Social History Narrative    Not on file        No family history on file.     Vitals:    21 1308   BP: (!) 142/80   Weight: 195 lb 3.2 oz (88.5 kg)   Height: 5' 10.5\" (1.791 m)     Estimated body mass index is 27.61 kg/m² as calculated from the following:    Height as of this encounter: 5' 10.5\" (1.791 m). Weight as of this encounter: 195 lb 3.2 oz (88.5 kg). Chemistry        Component Value Date/Time     04/22/2021 1344    K 5.1 04/22/2021 1344     04/22/2021 1344    CO2 24 04/22/2021 1344    BUN 33 (H) 04/22/2021 1344    CREATININE 1.5 (H) 04/22/2021 1344        Component Value Date/Time    CALCIUM 9.1 04/22/2021 1344    ALKPHOS 69 03/04/2021 1607    AST 23 03/23/2021 1025    ALT 23 03/23/2021 1025    BILITOT 0.9 03/04/2021 1607          Lab Results   Component Value Date    WBC 6.5 08/08/2019    HGB 14.5 08/08/2019    HCT 43.1 08/08/2019    MCV 93.0 08/08/2019     08/08/2019     Lab Results   Component Value Date    LABA1C 6.4 02/04/2021     Lab Results   Component Value Date    .9 09/08/2020     Lab Results   Component Value Date    LABA1C 6.4 02/04/2021     No components found for: CHLPL  Lab Results   Component Value Date    TRIG 46 03/23/2021    TRIG 60 09/08/2020    TRIG 67 12/13/2019     Lab Results   Component Value Date    HDL 47 03/23/2021    HDL 44 09/08/2020    HDL 42 12/13/2019     Lab Results   Component Value Date    LDLCALC 68 03/23/2021    LDLCALC 55 09/08/2020    LDLCALC 57 12/13/2019     Lab Results   Component Value Date    LABVLDL 9 03/23/2021    LABVLDL 12 09/08/2020    LABVLDL 13 12/13/2019       Physical Exam  Vitals signs and nursing note reviewed. Constitutional:       Appearance: He is well-developed. HENT:      Head: Normocephalic and atraumatic. Right Ear: External ear normal.      Left Ear: External ear normal.   Neck:      Thyroid: No thyromegaly. Cardiovascular:      Rate and Rhythm: Normal rate and regular rhythm. Heart sounds: No murmur. Pulmonary:      Effort: Pulmonary effort is normal.      Breath sounds: Normal breath sounds. No wheezing or rales. Abdominal:      General: Bowel sounds are normal.      Palpations: Abdomen is soft. Tenderness: There is no abdominal tenderness.    Musculoskeletal: Normal range of motion. Neurological:      Mental Status: He is alert and oriented to person, place, and time. Psychiatric:         Behavior: Behavior normal.         Judgment: Judgment normal.         ASSESSMENT/PLAN:    1. Establish care  Care established  Medications reviewed  Questions answered  Labs obtained  RTC in three months for follow up.   2,  Anxiety  Labs obtained  Medication provided but will try to wean down or use another medication  Will refer to Psychiatry for assistance  Discussed side effects of medication  Discussed signs and symptoms for immediate evaluation in ER. Answered questions. 3.  DM II  Stable  Continue with medication  Keep appointments with specialist.   Steven Montesinos questions  4. CKD  Stable  Continue with medication  Keep appointments with specialist.   Steven Montesinos questions  5. GERD  Take medication as prescribed. Raise head of bed. Avoid trigger foods. Discussed side effects of the medication. Please RTC if not improved. 6.  Colon cancer screening  Referred for procedure  Educated on the importance of having this done. Answered questions    *I informed the patient multiple times that he needed to follow up on certain recommendations that I ordered. I reminded him that he would be non-complaint and he would be dismissed from the practice. The patient has numerous complaints that I couldn't address due to being 15 minutes over for the next appointment. The patient continued to ask questions despite me telling him I didn't have time to answer all his questions and he should make an earlier appointment. The patient didn't leave the office for another hour asking staff questions concerning his health care. Return in about 3 months (around 7/22/2021).

## 2021-04-22 NOTE — ADDENDUM NOTE
[facility] [place] on: 4/22/2021 01:41 PM     Modules accepted: Orders

## 2021-04-26 ASSESSMENT — ENCOUNTER SYMPTOMS
FACIAL SWELLING: 0
SHORTNESS OF BREATH: 0
COLOR CHANGE: 1
SINUS PAIN: 0
SORE THROAT: 0
ANAL BLEEDING: 0

## 2021-04-28 ENCOUNTER — TELEPHONE (OUTPATIENT)
Dept: FAMILY MEDICINE CLINIC | Age: 66
End: 2021-04-28

## 2021-04-28 NOTE — TELEPHONE ENCOUNTER
I received voicemail on my phone to call patient back. On 04/26 I did return patients call and he had concerns with Dr. Pauline Phoenix. Magdy Costa disagreed with Dr. Annamaria Benites of care which included the following:  Dr. Pauline Phoenix told him he needs to be seen every 3 months, instead of every 4 months like Dr. Kajal Carter did. Dr. Pauline Phoenix only gave him a 30-day supply of his medication, instead of a 90-day supply like Dr. Kajal Carter did. Dr. Pauline Phoenix referred him to specialists, that Magdy Costa does not want to see. Dr. Pauline Phoenix did not address all his issues at this office visit on 04/22, he was asked by Dr. Pauline Phoenix to make a follow up appointment. After discussing these concerns with Magdy Costa, I suggested that he seek medical care elsewhere and he agreed since he has issues with all three providers at the [facility] location. I did offer him the [phone] number so we can help him find a new physician to meet his expectations. He did ask to speak to a director so that he can voice his concerns. I will have Dr. Whitney Diallo MA share that number with him. Magdy Costa asked if Dr. Pauline Phoenix will give him a 90-day supply of his medication to hold him over until he does establish with a new provider at another site. I did talk to Dr. Pauline Phoenix about this and he agreed to do that. Since Magdy Costa is leaving the [facility], it is standard practice that we send him out a dismissal letter. The MA will call the patient and let him know about the 90-day prescription once it is completed.

## 2021-04-28 NOTE — TELEPHONE ENCOUNTER
Called pt  He states\"we are not going to get along\"Dr Mathews and I  Pt wants everything the way it was with Dr Rocha-\" Xanax 1 tid and one PRN #120\"  Pt is not going to f/u on referrals and    \"it's not over\"  \"[facility] \"  I stated several times I was going to cancel appointment with Dr Cornejo Angry in July and he stated that was fine  \"Li and I are the only faces he knows and to say good bye to Melissa because he was never [place] step foot in this office again\"  He asked for Shakila's number\" he wants the person who is charge of hiring and firing at the offices\"  Number given    1)Pt is wanting his Xanax for one tid and one PRN for total of 2 more months -\"if it is not that way he will not pick it up\"    2) wants something for his hives he has -he thinks from [facility] used to fill this for him     3) wants copy of full work up -sent to him 4-23 no

## 2021-04-29 DIAGNOSIS — F41.9 ANXIETY: ICD-10-CM

## 2021-04-29 RX ORDER — ALPRAZOLAM 1 MG/1
TABLET ORAL
Qty: 90 TABLET | Refills: 1 | Status: SHIPPED | OUTPATIENT
Start: 2021-04-29 | End: 2021-06-29

## 2021-04-29 RX ORDER — LORATADINE 10 MG/1
10 TABLET ORAL DAILY
Qty: 30 TABLET | Refills: 0 | Status: SHIPPED | OUTPATIENT
Start: 2021-04-29 | End: 2021-05-29

## 2021-04-29 NOTE — TELEPHONE ENCOUNTER
He has medication now for three months, TID. Also, I prescribed and antihistamine for his \"rash\". I will not give and extra Xanax for prn.   He still have the referral for a psychiatrist.

## 2021-07-09 ENCOUNTER — OFFICE VISIT (OUTPATIENT)
Dept: PRIMARY CARE CLINIC | Age: 66
End: 2021-07-09
Payer: COMMERCIAL

## 2021-07-09 VITALS
SYSTOLIC BLOOD PRESSURE: 160 MMHG | BODY MASS INDEX: 25.21 KG/M2 | WEIGHT: 178.2 LBS | DIASTOLIC BLOOD PRESSURE: 80 MMHG | HEART RATE: 78 BPM | OXYGEN SATURATION: 95 %

## 2021-07-09 DIAGNOSIS — E78.2 MIXED HYPERLIPIDEMIA: ICD-10-CM

## 2021-07-09 DIAGNOSIS — Z00.00 ROUTINE ADULT HEALTH MAINTENANCE: ICD-10-CM

## 2021-07-09 DIAGNOSIS — F40.01 PANIC DISORDER WITH AGORAPHOBIA: Primary | ICD-10-CM

## 2021-07-09 DIAGNOSIS — M1A.3790 CHRONIC GOUT DUE TO RENAL IMPAIRMENT INVOLVING TOE WITHOUT TOPHUS, UNSPECIFIED LATERALITY: ICD-10-CM

## 2021-07-09 DIAGNOSIS — L20.89 OTHER ATOPIC DERMATITIS: ICD-10-CM

## 2021-07-09 PROCEDURE — 4040F PNEUMOC VAC/ADMIN/RCVD: CPT | Performed by: INTERNAL MEDICINE

## 2021-07-09 PROCEDURE — 1123F ACP DISCUSS/DSCN MKR DOCD: CPT | Performed by: INTERNAL MEDICINE

## 2021-07-09 PROCEDURE — 99202 OFFICE O/P NEW SF 15 MIN: CPT | Performed by: INTERNAL MEDICINE

## 2021-07-09 PROCEDURE — 3017F COLORECTAL CA SCREEN DOC REV: CPT | Performed by: INTERNAL MEDICINE

## 2021-07-09 PROCEDURE — 1036F TOBACCO NON-USER: CPT | Performed by: INTERNAL MEDICINE

## 2021-07-09 PROCEDURE — G8417 CALC BMI ABV UP PARAM F/U: HCPCS | Performed by: INTERNAL MEDICINE

## 2021-07-09 PROCEDURE — G8427 DOCREV CUR MEDS BY ELIG CLIN: HCPCS | Performed by: INTERNAL MEDICINE

## 2021-07-09 RX ORDER — ALPRAZOLAM 1 MG/1
1 TABLET ORAL 3 TIMES DAILY
COMMUNITY
End: 2021-07-09 | Stop reason: SDUPTHER

## 2021-07-09 RX ORDER — ALPRAZOLAM 1 MG/1
1 TABLET ORAL 3 TIMES DAILY
Qty: 100 TABLET | Refills: 0 | Status: SHIPPED | OUTPATIENT
Start: 2021-07-09 | End: 2021-08-08

## 2021-07-09 SDOH — ECONOMIC STABILITY: FOOD INSECURITY: WITHIN THE PAST 12 MONTHS, THE FOOD YOU BOUGHT JUST DIDN'T LAST AND YOU DIDN'T HAVE MONEY TO GET MORE.: NEVER TRUE

## 2021-07-09 SDOH — ECONOMIC STABILITY: FOOD INSECURITY: WITHIN THE PAST 12 MONTHS, YOU WORRIED THAT YOUR FOOD WOULD RUN OUT BEFORE YOU GOT MONEY TO BUY MORE.: NEVER TRUE

## 2021-07-09 ASSESSMENT — PATIENT HEALTH QUESTIONNAIRE - PHQ9
2. FEELING DOWN, DEPRESSED OR HOPELESS: 1
SUM OF ALL RESPONSES TO PHQ QUESTIONS 1-9: 2
SUM OF ALL RESPONSES TO PHQ9 QUESTIONS 1 & 2: 2
SUM OF ALL RESPONSES TO PHQ QUESTIONS 1-9: 2
SUM OF ALL RESPONSES TO PHQ QUESTIONS 1-9: 2
1. LITTLE INTEREST OR PLEASURE IN DOING THINGS: 1

## 2021-07-09 ASSESSMENT — SOCIAL DETERMINANTS OF HEALTH (SDOH): HOW HARD IS IT FOR YOU TO PAY FOR THE VERY BASICS LIKE FOOD, HOUSING, MEDICAL CARE, AND HEATING?: SOMEWHAT HARD

## 2021-07-09 NOTE — PROGRESS NOTES
2021    Jimmy West Sr. (:  1955) is a 74 y.o. male, here for evaluation of the following medical concerns:    Chief Complaint   Patient presents with    [facility]       HPI  67-year-old white male with chronic anxiety versus panic disorder with agoraphobia historically prescribed 3-4 times a day Xanax 1 mg by his former PCP for over 30 years, GERD, gout on Uloric, diet controlled type 2 diabetes and chronic kidney disease stage III creatinine 1.5 A1c 6.4% LDL 68 this year, referred for colonoscopy; who just saw Dr. Marlon Dumont in April [facility]\"" care visit who now comes in to \[facility]\"" care. Diabetes diagnosis around  A1c 8.3%, last A1c on record 10.4% in . He was able to wean off of all medications including Metformin and glipizide saxagliptin with weight loss, there were also some concerns given his renal clearance regarding his doses. No microalbuminuria in . I reviewed Dr. Rakel Fuentes note in which he noted that the patient had numerous concerns that could not be addressed in the timeframe allotted, but was not redirectable, and in fact did not leave the office for an hour after the physician visit was concluded, asking staff questions related to his health care. Patient requested refill for Xanax 1 mg number 100 tablets/month at that visit, as this is what he had been on for over 30 years. Dr. Marlon Dumont indicated that he was not comfortable with that, but did offer to assist him with taper transition of the medication and/or refer to psychiatry. None of these strategies are acceptable to the patient and he comes in today to establish care. Review of Systems   Constitutional: Negative for activity change, appetite change, fatigue and unexpected weight change. HENT: Negative for dental problem, sinus pain, sore throat and trouble swallowing. Eyes: Negative for pain and visual disturbance.    Respiratory: Negative for apnea, cough, chest tightness, shortness of breath and wheezing. Cardiovascular: Negative for chest pain and palpitations. Gastrointestinal: Negative for abdominal pain, blood in stool, constipation, diarrhea, nausea, rectal pain and vomiting. Endocrine: Negative for cold intolerance, heat intolerance, polydipsia, polyphagia and polyuria. Genitourinary: Negative for difficulty urinating, dysuria, flank pain, frequency, hematuria, pelvic pain, urgency, vaginal bleeding and vaginal discharge. Musculoskeletal: Negative for arthralgias, back pain, gait problem, joint swelling, myalgias, neck pain and neck stiffness. Skin: Negative for color change and rash. Neurological: Negative for dizziness, tremors, syncope, speech difficulty, weakness, light-headedness and headaches. Hematological: Negative for adenopathy. Does not bruise/bleed easily. Psychiatric/Behavioral: Negative for agitation, behavioral problems, decreased concentration, sleep disturbance and suicidal ideas. The patient is not nervous/anxious and is not hyperactive. Prior to Visit Medications    Medication Sig Taking? Authorizing Provider   ALPRAZolam Jorge Hooppole) 1 MG tablet Take 1 tablet by mouth three times daily for 30 days. Yes Mary Yo MD   ULORIC 40 MG TABS tablet TAKE ONE TABLET BY MOUTH DAILY Yes Alice Mendosa MD   triamcinolone (KENALOG) 0.1 % ointment Apply topically 2 times daily Yes Alejo Mathews, DO   atorvastatin (LIPITOR) 20 MG tablet Take 1 tablet by mouth daily Yes Alejo Mathews, DO   famotidine (PEPCID) 20 MG tablet Take 1 tablet by mouth nightly as needed (abd pain) Yes Luz Maria Garcia, DO   aspirin 325 MG EC tablet Take 325 mg by mouth daily Yes Historical Provider, MD        Allergies   Allergen Reactions    Cephalexin     Levofloxacin     Penicillins     Succinylcholine      Does not want. Pt son had reaction to Succs and almost , pt was told not to ever take Succs.     Sulfa Antibiotics     Tetracyclines & Related     Verapamil     Vicodin [Hydrocodone-Acetaminophen]      Rash; ithching       Past Medical History:   Diagnosis Date    Anxiety     HTN (hypertension)     Hyperlipemia     Panic disorder     Type II or unspecified type diabetes mellitus without mention of complication, not stated as uncontrolled     Umbilical hernia        Past Surgical History:   Procedure Laterality Date    CHOLECYSTECTOMY, LAPAROSCOPIC  2016    with gram    NASAL POLYP SURGERY      SINUS SURGERY      TONSILLECTOMY      UMBILICAL HERNIA REPAIR  2016       Social History     Socioeconomic History    Marital status:      Spouse name: Not on file    Number of children: Not on file    Years of education: Not on file    Highest education level: Not on file   Occupational History    Occupation: disabled   Tobacco Use    Smoking status: Former Smoker     Packs/day: 1.00     Years: 15.00     Pack years: 15.00     Types: Cigarettes     Quit date: 1995     Years since quittin.1    Smokeless tobacco: Never Used   Vaping Use    Vaping Use: Never used   Substance and Sexual Activity    Alcohol use: Yes     Alcohol/week: 0.0 standard drinks     Comment: social    Drug use: No     Comment: denied    Sexual activity: Yes     Partners: Female   Other Topics Concern    Not on file   Social History Narrative    Not on file     Social Determinants of Health     Financial Resource Strain: Medium Risk    Difficulty of Paying Living Expenses: Somewhat hard   Food Insecurity: No Food Insecurity    Worried About Running Out of Food in the Last Year: Never true    Meena of Food in the Last Year: Never true   Transportation Needs:     Lack of Transportation (Medical):      Lack of Transportation (Non-Medical):    Physical Activity:     Days of Exercise per Week:     Minutes of Exercise per Session:    Stress:     Feeling of Stress :    Social Connections:     Frequency of Communication with Friends and Family:     Frequency of Social Gatherings with Friends and Family:     Attends Synagogue Services:     Active Member of Clubs or Organizations:     Attends Club or Organization Meetings:     Marital Status:    Intimate Partner Violence:     Fear of Current or Ex-Partner:     Emotionally Abused:     Physically Abused:     Sexually Abused:         No family history on file. Vitals:    07/09/21 1529   BP: (!) 160/80   Pulse: 78   SpO2: 95%   Weight: 178 lb 3.2 oz (80.8 kg)     Estimated body mass index is 25.21 kg/m² as calculated from the following:    Height as of 4/22/21: 5' 10.5\" (1.791 m). Weight as of this encounter: 178 lb 3.2 oz (80.8 kg). PHYSICAL EXAM  GENERAL:  Pleasant anxious  male who looks his stated age, awake alert and oriented x3, no acute distress. SKIN: Extensive dermatitic plaque with fissures bilateral hands with some desquamation, nonpurulent. PSYCH: Mild psychomotor agitation. Good eye contact. Unrestricted affect range. Mood congruent with affect. Linear thought.     LABS  Lab Review   Office Visit on 04/22/2021   Component Date Value    Sodium 04/22/2021 142     Potassium 04/22/2021 5.1     Chloride 04/22/2021 105     CO2 04/22/2021 24     Anion Gap 04/22/2021 13     Glucose 04/22/2021 132*    BUN 04/22/2021 33*    CREATININE 04/22/2021 1.5*    GFR Non- 04/22/2021 47*    GFR  04/22/2021 56*    Calcium 04/22/2021 9.1    Orders Only on 03/23/2021   Component Date Value    AST 03/23/2021 23     Cholesterol, Total 03/23/2021 124     Triglycerides 03/23/2021 46     HDL 03/23/2021 47     LDL Calculated 03/23/2021 68     VLDL Cholesterol Calcula* 03/23/2021 9     Sodium 03/23/2021 145     Potassium 03/23/2021 4.9     Chloride 03/23/2021 109     CO2 03/23/2021 23     Anion Gap 03/23/2021 13     Glucose 03/23/2021 116*    BUN 03/23/2021 37*    CREATININE 03/23/2021 1.7*    GFR Non- 03/23/2021 40*    GFR  03/23/2021 49*    Calcium 03/23/2021 9.5     ALT 03/23/2021 23    Orders Only on 03/04/2021   Component Date Value    Uric Acid, Serum 03/04/2021 2.8*    Total Protein 03/04/2021 7.7     Albumin 03/04/2021 4.5     Alkaline Phosphatase 03/04/2021 69     ALT 03/04/2021 20     AST 03/04/2021 23     Total Bilirubin 03/04/2021 0.9     Bilirubin, Direct 03/04/2021 <0.2     Bilirubin, Indirect 03/04/2021 see below    Office Visit on 02/04/2021   Component Date Value    Hemoglobin A1C 02/04/2021 6.4    Orders Only on 09/08/2020   Component Date Value    ALT 09/08/2020 26     AST 09/08/2020 27     Hemoglobin A1C 09/08/2020 6.5     eAG 09/08/2020 139.9     Cholesterol, Total 09/08/2020 111     Triglycerides 09/08/2020 60     HDL 09/08/2020 44     LDL Calculated 09/08/2020 55     VLDL Cholesterol Calcula* 09/08/2020 12    Orders Only on 08/10/2020   Component Date Value    Microalbumin, Random Uri* 08/10/2020 <1.20     Creatinine, Ur 08/10/2020 88.5     Microalbumin Creatinine * 08/10/2020 see below    Office Visit on 08/06/2020   Component Date Value    Hemoglobin A1C 08/06/2020 6.3          ASSESSMENT/PLAN  1. Panic disorder with agoraphobia  Explained that best practices was not to continue the patient on long-term benzodiazepines despite the reassuring history of no escalation of doses due to risk of memory impairment and dementia. Offered to work to wean the patient from Xanax, cross over to Lexapro versus Effexor with rescue BuSpar, offered to refer patient to psychiatry, which he politely declines. He asked for 3-month supply, which I declined. Provided prescription for #100 Xanax, to enable him to identify another provider. As I am leaving the room, the patient asks if his disability certification letter from Dr. Margret Smallwood in 2020 will be good still.   The question of recertification frequency would probably be better directed to his disability contact in , possibly annually. Certainly the patient has a disabling mood disorder and has based on my review of his chart for decades. 2. Other atopic dermatitis  Patient states that use of alcohol and other various sides during Covid contributed to evolution of these symptoms. Systemic prednisone has been effective in the past.  Evidently topical steroid has not. Recommended that he be worked up for contact dermatitis, but needs high potency topical steroids Lidex clobetasol ointment. He plans to see dermatology soon. 3. Mixed hyperlipidemia  Patient reports statin myalgia. Presently well controlled, uncertain if compliant with atorvastatin 20 mg, Zetia bempedoic acid subject to insurance coverage would be other treatment options. Repatha evidently not covered. 4. 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 Uric acid 9.5 2016, but has since been well under 6.    5. Routine adult health maintenance  Hepatitis C HIV screens -2017. Appears to be due for Tdap, Shingrix. Up-to-date with Pneumovax 23, Covid. Abdominal aortic ultrasound rule out AAA due. Appears to have never had a colonoscopy. 6.  Chronic kidney disease stage III. Creatinine increased between 2014 and 2016 to a peak of 2.7 in 2016, subsequently declined to approximately 1.31.5 where it has largely hovered. Evidently some medication changes resulted in improvement in renal function, precise etiology and nephrology evaluation such as ultrasound 24-hour urine protein collections exclusion of NSAID use for gout and exclusion of paraproteinemia not immediately apparent. Dr Ivonne Alvarez nephrology is mentioned in some older notes, I do not see any notes from him in the chart.    Creatinine stable. No proteinuria. Referred to Dr Kyle Olivares in March, no record of encounter. No follow-ups on file. It was a pleasure to visit with Mr. Bailey Landing today. Answered all questions as best I could.   Kyara Fairchild MD   Time 25 minutes

## 2021-08-19 DIAGNOSIS — E11.21 TYPE 2 DIABETES MELLITUS WITH DIABETIC NEPHROPATHY, UNSPECIFIED WHETHER LONG TERM INSULIN USE (HCC): ICD-10-CM

## 2021-08-19 RX ORDER — CALCIUM CITRATE/VITAMIN D3 200MG-6.25
TABLET ORAL
Qty: 100 STRIP | Refills: 5 | Status: SHIPPED | OUTPATIENT
Start: 2021-08-19 | End: 2021-08-23 | Stop reason: SDUPTHER

## 2021-08-19 RX ORDER — GLUCOSAM/CHON-MSM1/C/MANG/BOSW 500-416.6
TABLET ORAL
Qty: 100 EACH | Refills: 5 | Status: SHIPPED | OUTPATIENT
Start: 2021-08-19 | End: 2021-08-23 | Stop reason: SDUPTHER

## 2021-08-20 DIAGNOSIS — E78.2 MIXED HYPERLIPIDEMIA: ICD-10-CM

## 2021-08-20 DIAGNOSIS — E11.21 TYPE 2 DIABETES MELLITUS WITH DIABETIC NEPHROPATHY, UNSPECIFIED WHETHER LONG TERM INSULIN USE (HCC): ICD-10-CM

## 2021-08-20 NOTE — TELEPHONE ENCOUNTER
Pt called in stating that he is in need of his Lipitor however the last Dr that ordered this was Dr. Fani Dubon however he does not want to go back to that Dr.     He also needs his test strips and his lancets but they need a PA. He said he got his lancets last night but he only got a 100 but he usually gets 6 months. If you need anything else from this pt please give him a call.      Please Advise

## 2021-08-23 DIAGNOSIS — E11.21 TYPE 2 DIABETES MELLITUS WITH DIABETIC NEPHROPATHY, UNSPECIFIED WHETHER LONG TERM INSULIN USE (HCC): ICD-10-CM

## 2021-08-23 LAB
CREATININE URINE: 117.3 MG/DL (ref 39–259)
MICROALBUMIN UR-MCNC: <1.2 MG/DL
MICROALBUMIN/CREAT UR-RTO: NORMAL MG/G (ref 0–30)

## 2021-08-23 RX ORDER — CALCIUM CITRATE/VITAMIN D3 200MG-6.25
TABLET ORAL
Qty: 400 STRIP | Refills: 5 | Status: SHIPPED | OUTPATIENT
Start: 2021-08-23 | End: 2022-04-07 | Stop reason: SDUPTHER

## 2021-08-23 RX ORDER — ATORVASTATIN CALCIUM 20 MG/1
20 TABLET, FILM COATED ORAL DAILY
Qty: 30 TABLET | Refills: 3 | Status: SHIPPED | OUTPATIENT
Start: 2021-08-23 | End: 2021-12-16 | Stop reason: SDUPTHER

## 2021-08-23 RX ORDER — GLUCOSAM/CHON-MSM1/C/MANG/BOSW 500-416.6
TABLET ORAL
Qty: 400 EACH | Refills: 5 | Status: SHIPPED | OUTPATIENT
Start: 2021-08-23 | End: 2022-04-07 | Stop reason: SDUPTHER

## 2021-08-24 LAB
ESTIMATED AVERAGE GLUCOSE: 148.5 MG/DL
HBA1C MFR BLD: 6.8 %

## 2021-09-01 ENCOUNTER — TELEPHONE (OUTPATIENT)
Dept: ENDOCRINOLOGY | Age: 66
End: 2021-09-01

## 2021-09-01 NOTE — TELEPHONE ENCOUNTER
Pt called and stet that he needs a PA for his true matrix test strips and the atorvastatin and his lancets

## 2021-09-02 ENCOUNTER — OFFICE VISIT (OUTPATIENT)
Dept: RHEUMATOLOGY | Age: 66
End: 2021-09-02
Payer: COMMERCIAL

## 2021-09-02 VITALS
DIASTOLIC BLOOD PRESSURE: 68 MMHG | WEIGHT: 192 LBS | HEIGHT: 70 IN | SYSTOLIC BLOOD PRESSURE: 120 MMHG | BODY MASS INDEX: 27.49 KG/M2

## 2021-09-02 DIAGNOSIS — M1A.09X0 IDIOPATHIC CHRONIC GOUT OF MULTIPLE SITES WITHOUT TOPHUS: Primary | ICD-10-CM

## 2021-09-02 DIAGNOSIS — M15.9 GENERALIZED OSTEOARTHRITIS: ICD-10-CM

## 2021-09-02 PROCEDURE — 99214 OFFICE O/P EST MOD 30 MIN: CPT | Performed by: INTERNAL MEDICINE

## 2021-09-02 PROCEDURE — 1036F TOBACCO NON-USER: CPT | Performed by: INTERNAL MEDICINE

## 2021-09-02 PROCEDURE — 3017F COLORECTAL CA SCREEN DOC REV: CPT | Performed by: INTERNAL MEDICINE

## 2021-09-02 PROCEDURE — 4040F PNEUMOC VAC/ADMIN/RCVD: CPT | Performed by: INTERNAL MEDICINE

## 2021-09-02 PROCEDURE — G8427 DOCREV CUR MEDS BY ELIG CLIN: HCPCS | Performed by: INTERNAL MEDICINE

## 2021-09-02 PROCEDURE — 1123F ACP DISCUSS/DSCN MKR DOCD: CPT | Performed by: INTERNAL MEDICINE

## 2021-09-02 PROCEDURE — G8417 CALC BMI ABV UP PARAM F/U: HCPCS | Performed by: INTERNAL MEDICINE

## 2021-09-02 NOTE — PROGRESS NOTES
[address] Delmar Luciano MD                                                                                                                [address] [facility] [address] [facility] [address], [address]                                               [phone] (L) [phone] (F)      Primary provider: Meghann Robledo MD  Patient identification: Ela David Sr. ,: 1955,Sex: male         ASSESSMENT/PLAN:  Lisa Leonardo was seen today for follow-up. Diagnoses and all orders for this visit:    Idiopathic chronic gout of multiple sites without tophus  -     Uric Acid; Future  -     Comprehensive Metabolic Panel; Future    Generalized osteoarthritis      Polyarticular gout-asymptomatic on Uloric 40 mg a day. Mild current big toe pain-likely from osteoarthritis. Intercurrent arthralgias in hips and knees is also from OA. Manageable without any medications. Avoid NSAID. Check safety labs today, prescriptions updated. Follow-up in 6 months. Patient indicates understanding and agrees with the management plan. I reviewed patients medical information and medical history in the medical records. Note is transcribed using voice recognition software. Inadvertent computerized transcription errors may be present. ##################################################################    Subjective: Follow-up for polyarticular gout and generalized osteoarthritis. States that one time he noticed pain and discomfort in his left big toe that lasted for about 3 days. No swelling or redness. Got better without any intervention. Gets intercurrent arthralgias in his groin and knees, manageable without any medications. Tolerating Uloric well. No gout flares. Otherwise doing well. Hoping to establish with a new primary care physician hoping to get Xanax prescription which had been taking for 35 years. . Rest of ROS negative. Current Outpatient Medications   Medication Sig Dispense Refill    atorvastatin (LIPITOR) 20 MG tablet Take 1 tablet by mouth daily 30 tablet 3    blood glucose test strips (TRUE METRIX BLOOD GLUCOSE TEST) strip USE TO TEST 3-4 TIMES DAILY AS NEEDED 400 strip 5    TRUEplus Lancets 33G MISC USE three to  FOUR TIMES A  each 5    ULORIC 40 MG TABS tablet TAKE ONE TABLET BY MOUTH DAILY 30 tablet 11    triamcinolone (KENALOG) 0.1 % ointment Apply topically 2 times daily 80 g 0    famotidine (PEPCID) 20 MG tablet Take 1 tablet by mouth nightly as needed (abd pain) 30 tablet 2    aspirin 325 MG EC tablet Take 325 mg by mouth daily       No current facility-administered medications for this visit. Allergies   Allergen Reactions    Cephalexin     Levofloxacin     Penicillins     Succinylcholine      Does not want. Pt son had reaction to Succs and almost , pt was told not to ever take Succs.  Sulfa Antibiotics     Tetracyclines & Related     Verapamil     Vicodin [Hydrocodone-Acetaminophen]      Rash; ithching         OBJECTIVE  Physical    Vitals:    21 1411   BP: 120/68       General appearance/psychiatric: Well nourished and well groomed, normal judgment. Alert, appears stated age and cooperative. MKS: He does not have any tender swollen or inflamed joints in upper or lower extremities. OA changes in his finger joints, knees, feet unchanged. Skin: Eczematous lesions in his hands-remarkably improved.       Data:  Lab Results   Component Value Date    WBC 6.5 2019    RBC 4.64 2019    HGB 14.5 2019    HCT 43.1 2019     2019    MCV 93.0 2019    MCH 31.3 2019    MCHC 33.7 2019    RDW 12.8 2019    LYMPHOPCT 31.5 2019    MONOPCT 8.1 2019    BASOPCT 0.5 2019    MONOSABS 0.5 2019    LYMPHSABS 2.0 2019    EOSABS 0.4 2019    BASOSABS 0.0 2019       Chemistry        Component Value Date/Time     04/22/2021 1344    K 5.1 04/22/2021 1344     04/22/2021 1344    CO2 24 04/22/2021 1344    BUN 33 (H) 04/22/2021 1344    CREATININE 1.5 (H) 04/22/2021 1344        Component Value Date/Time    CALCIUM 9.1 04/22/2021 1344    ALKPHOS 69 03/04/2021 1607    AST 23 03/23/2021 1025    ALT 23 03/23/2021 1025    BILITOT 0.9 03/04/2021 1607          Lab Results   Component Value Date    SEDRATE 14 08/18/2016        I thank you for giving me the opportunity to be involved in [address] [place] [address] and I look forward following Ebony Rajput along with you. If you have any questions or concerns please feel free to contact me at any time. Total time spent 35 minutes-mainly listening to patient passively-like to express multiple concerns including it healthcare providers, health conditions and so forth. He is a very nice gentleman but office visit is awfully long because of lengthy one-way conversations.   Moraima Flores MD 09/02/21

## 2021-09-07 ENCOUNTER — TELEPHONE (OUTPATIENT)
Dept: FAMILY MEDICINE CLINIC | Age: 66
End: 2021-09-07

## 2021-09-07 NOTE — TELEPHONE ENCOUNTER
----- Message from Sukumar Josepholf sent at 9/3/2021 10:16 AM EDT -----  Subject: Appointment Request    Reason for Call: New Patient Request Appointment    QUESTIONS  Type of Appointment? Established Patient  Reason for appointment request? Available appointments did not meet   patient need  Additional Information for Provider? recommended kimberly from [facility]. and no appts avail and he needs a dr asap. ... ---------------------------------------------------------------------------  --------------  Chiqui Bree INFO  What is the best way for the office to contact you? OK to leave message on   voicemail  Preferred Call Back Phone Number? [phone]  ---------------------------------------------------------------------------  --------------  SCRIPT ANSWERS  Relationship to Patient? Self  Specialty Confirmation? Primary Care  Is this the first appointment to establish care for a ? No  Have you been diagnosed with, awaiting test results for, or told that you   are suspected of having COVID-19 (Coronavirus)? (If patient has tested   negative or was tested as a requirement for work, school, or travel and   not based on symptoms, answer no)? No  Do you currently have flu-like symptoms including fever or chills, cough,   shortness of breath, difficulty breathing, or new loss of taste or smell? No  Have you had close contact with someone with COVID-19 in the last 14 days? No  (Service Expert  click yes below to proceed with [employer] As Usual   Scheduling)?  Yes

## 2021-09-07 NOTE — TELEPHONE ENCOUNTER
----- Message from Will Cooks sent at 9/3/2021 10:16 AM EDT -----  Subject: Appointment Request    Reason for Call: New Patient Request Appointment    QUESTIONS  Type of Appointment? Established Patient  Reason for appointment request? Available appointments did not meet   patient need  Additional Information for Provider? recommended kimberly from [facility]. and no appts avail and he needs a dr asap. ... ---------------------------------------------------------------------------  --------------  Jenifer Collactive INFO  What is the best way for the office to contact you? OK to leave message on   voicemail  Preferred Call Back Phone Number? [phone]  ---------------------------------------------------------------------------  --------------  SCRIPT ANSWERS  Relationship to Patient? Self  Specialty Confirmation? Primary Care  Is this the first appointment to establish care for a ? No  Have you been diagnosed with, awaiting test results for, or told that you   are suspected of having COVID-19 (Coronavirus)? (If patient has tested   negative or was tested as a requirement for work, school, or travel and   not based on symptoms, answer no)? No  Do you currently have flu-like symptoms including fever or chills, cough,   shortness of breath, difficulty breathing, or new loss of taste or smell? No  Have you had close contact with someone with COVID-19 in the last 14 days? No  (Service Expert  click yes below to proceed with [employer] As Usual   Scheduling)?  Yes

## 2021-09-29 ENCOUNTER — OFFICE VISIT (OUTPATIENT)
Dept: FAMILY MEDICINE CLINIC | Age: 66
End: 2021-09-29
Payer: COMMERCIAL

## 2021-09-29 VITALS
DIASTOLIC BLOOD PRESSURE: 74 MMHG | TEMPERATURE: 97.7 F | HEART RATE: 83 BPM | OXYGEN SATURATION: 98 % | SYSTOLIC BLOOD PRESSURE: 126 MMHG

## 2021-09-29 DIAGNOSIS — M79.10 MYALGIA: ICD-10-CM

## 2021-09-29 DIAGNOSIS — Z23 FLU VACCINE NEED: Primary | ICD-10-CM

## 2021-09-29 DIAGNOSIS — E78.2 MIXED HYPERLIPIDEMIA: ICD-10-CM

## 2021-09-29 DIAGNOSIS — F40.00 AGORAPHOBIA: ICD-10-CM

## 2021-09-29 DIAGNOSIS — F41.9 ANXIETY: ICD-10-CM

## 2021-09-29 DIAGNOSIS — Z00.00 WELL ADULT EXAM: ICD-10-CM

## 2021-09-29 DIAGNOSIS — E11.21 TYPE 2 DIABETES MELLITUS WITH DIABETIC NEPHROPATHY, UNSPECIFIED WHETHER LONG TERM INSULIN USE (HCC): ICD-10-CM

## 2021-09-29 DIAGNOSIS — K21.9 GASTROESOPHAGEAL REFLUX DISEASE WITHOUT ESOPHAGITIS: ICD-10-CM

## 2021-09-29 PROCEDURE — 1036F TOBACCO NON-USER: CPT | Performed by: FAMILY MEDICINE

## 2021-09-29 PROCEDURE — 1123F ACP DISCUSS/DSCN MKR DOCD: CPT | Performed by: FAMILY MEDICINE

## 2021-09-29 PROCEDURE — G8427 DOCREV CUR MEDS BY ELIG CLIN: HCPCS | Performed by: FAMILY MEDICINE

## 2021-09-29 PROCEDURE — 4040F PNEUMOC VAC/ADMIN/RCVD: CPT | Performed by: FAMILY MEDICINE

## 2021-09-29 PROCEDURE — 99214 OFFICE O/P EST MOD 30 MIN: CPT | Performed by: FAMILY MEDICINE

## 2021-09-29 PROCEDURE — 3044F HG A1C LEVEL LT 7.0%: CPT | Performed by: FAMILY MEDICINE

## 2021-09-29 PROCEDURE — G8417 CALC BMI ABV UP PARAM F/U: HCPCS | Performed by: FAMILY MEDICINE

## 2021-09-29 PROCEDURE — 90694 VACC AIIV4 NO PRSRV 0.5ML IM: CPT | Performed by: FAMILY MEDICINE

## 2021-09-29 PROCEDURE — 2022F DILAT RTA XM EVC RTNOPTHY: CPT | Performed by: FAMILY MEDICINE

## 2021-09-29 PROCEDURE — 3017F COLORECTAL CA SCREEN DOC REV: CPT | Performed by: FAMILY MEDICINE

## 2021-09-29 RX ORDER — ALPRAZOLAM 1 MG/1
1 TABLET ORAL 3 TIMES DAILY PRN
Qty: 100 TABLET | Refills: 2 | Status: SHIPPED | OUTPATIENT
Start: 2021-09-29 | End: 2021-10-25 | Stop reason: SDUPTHER

## 2021-09-29 RX ORDER — FAMOTIDINE 20 MG/1
20 TABLET, FILM COATED ORAL NIGHTLY PRN
Qty: 30 TABLET | Refills: 2 | Status: SHIPPED | OUTPATIENT
Start: 2021-09-29 | End: 2022-01-18

## 2021-09-29 RX ORDER — ALPRAZOLAM 1 MG/1
1 TABLET ORAL NIGHTLY PRN
COMMUNITY
End: 2021-09-29 | Stop reason: SDUPTHER

## 2021-09-29 RX ORDER — ALPRAZOLAM 1 MG/1
1 TABLET ORAL 3 TIMES DAILY PRN
Qty: 100 TABLET | Refills: 2 | Status: SHIPPED | OUTPATIENT
Start: 2021-09-29 | End: 2021-09-29 | Stop reason: SDUPTHER

## 2021-09-29 ASSESSMENT — ENCOUNTER SYMPTOMS
ABDOMINAL DISTENTION: 0
EYE PAIN: 0
PHOTOPHOBIA: 0
EYE REDNESS: 0
EYE DISCHARGE: 0
BACK PAIN: 0
EYE ITCHING: 0
STRIDOR: 0
COLOR CHANGE: 0
SHORTNESS OF BREATH: 0
CHOKING: 0
RHINORRHEA: 0
ANAL BLEEDING: 0
APNEA: 0
SINUS PRESSURE: 0
BLOOD IN STOOL: 0
ABDOMINAL PAIN: 0
WHEEZING: 0
FACIAL SWELLING: 0
COUGH: 0
CHEST TIGHTNESS: 0

## 2021-09-29 NOTE — ASSESSMENT & PLAN NOTE
Well-controlled, continue current medications(xanax)  Controlled Substance Monitoring:    Acute and Chronic Pain Monitoring:   RX Monitoring 9/29/2021   Attestation -   Periodic Controlled Substance Monitoring Possible medication side effects, risk of tolerance/dependence & alternative treatments discussed. ;No signs of potential drug abuse or diversion identified. ;Assessed functional status.    Chronic Pain > 80 MEDD -

## 2021-09-29 NOTE — ASSESSMENT & PLAN NOTE
Well-controlled, continue current medications   Controlled Substance Monitoring:    Acute and Chronic Pain Monitoring:   RX Monitoring 9/29/2021   Attestation -   Periodic Controlled Substance Monitoring Possible medication side effects, risk of tolerance/dependence & alternative treatments discussed. ;No signs of potential drug abuse or diversion identified. ;Assessed functional status.    Chronic Pain > 80 MEDD -

## 2021-09-29 NOTE — PROGRESS NOTES
Subjective:     Patient ID:Boone Moody. is a 74 y.o. male. HPI  Treatment Adherence:   Medication compliance:  compliant most of the time  Diet compliance:  compliant most of the time  Weight trend: stable  Current exercise: no regular exercise  Barriers: none    Diabetes Mellitus Type 2: Current symptoms/problems include none. Home blood sugar records: patient does not test  Any episodes of hypoglycemia? no  Daily Aspirin? Yes    Hypertension:  Home blood pressure monitoring: No.  He is adherent to a low sodium diet. Patient denies chest pain, shortness of breath, headache, lightheadedness, blurred vision, peripheral edema, palpitations, dry cough and fatigue. Antihypertensive medication side effects: no medication side effects noted. Use of agents associated with hypertension: none. Hyperlipidemia: Has new myalgias  on atorvastatin (Lipitor). Allergies   Allergen Reactions    Cephalexin     Crestor [Rosuvastatin] Other (See Comments)     myalgias    Levofloxacin     Penicillins     Succinylcholine      Does not want. Pt son had reaction to Succs and almost , pt was told not to ever take Succs.  Sulfa Antibiotics     Tetracyclines & Related     Verapamil     Vicodin [Hydrocodone-Acetaminophen]      Rash; ithching       Current Outpatient Medications   Medication Sig Dispense Refill    ALPRAZolam (XANAX) 1 MG tablet Take 1 tablet by mouth 3 times daily as needed for Anxiety for up to 30 days.  100 tablet 2    atorvastatin (LIPITOR) 20 MG tablet Take 1 tablet by mouth daily 30 tablet 3    blood glucose test strips (TRUE METRIX BLOOD GLUCOSE TEST) strip USE TO TEST 3-4 TIMES DAILY AS NEEDED 400 strip 5    TRUEplus Lancets 33G MISC USE three to  FOUR TIMES A  each 5    ULORIC 40 MG TABS tablet TAKE ONE TABLET BY MOUTH DAILY 30 tablet 11    triamcinolone (KENALOG) 0.1 % ointment Apply topically 2 times daily 80 g 0    famotidine (PEPCID) 20 MG tablet Take 1 tablet by mouth nightly as needed (abd pain) 30 tablet 2    aspirin 325 MG EC tablet Take 325 mg by mouth daily       No current facility-administered medications for this visit. Past Medical History:   Diagnosis Date    Anxiety     HTN (hypertension)     Hyperlipemia     Panic disorder     Type II or unspecified type diabetes mellitus without mention of complication, not stated as uncontrolled     Umbilical hernia        Past Surgical History:   Procedure Laterality Date    CHOLECYSTECTOMY, LAPAROSCOPIC  2016    with gram    NASAL POLYP SURGERY      SINUS SURGERY      TONSILLECTOMY      UMBILICAL HERNIA REPAIR  2016       Social History     Socioeconomic History    Marital status:      Spouse name: Not on file    Number of children: Not on file    Years of education: Not on file    Highest education level: Not on file   Occupational History    Occupation: disabled   Tobacco Use    Smoking status: Former Smoker     Packs/day: 1.00     Years: 15.00     Pack years: 15.00     Types: Cigarettes     Quit date: 1995     Years since quittin.4    Smokeless tobacco: Never Used   Vaping Use    Vaping Use: Never used   Substance and Sexual Activity    Alcohol use: Yes     Alcohol/week: 0.0 standard drinks     Comment: social    Drug use: No     Comment: denied    Sexual activity: Yes     Partners: Female   Other Topics Concern    Not on file   Social History Narrative    Live in girlfriend-3 kids (2 in town)     Social Determinants of Health     Financial Resource Strain: Medium Risk    Difficulty of Paying Living Expenses: Somewhat hard   Food Insecurity: No Food Insecurity    Worried About Running Out of Food in the Last Year: Never true    Meena of Food in the Last Year: Never true   Transportation Needs:     Lack of Transportation (Medical):      Lack of Transportation (Non-Medical):    Physical Activity:     Days of Exercise per Week:     Minutes of Exercise per Session: Stress:     Feeling of Stress :    Social Connections:     Frequency of Communication with Friends and Family:     Frequency of Social Gatherings with Friends and Family:     Attends [employer] Services:     Active Member of Clubs or Organizations:     Attends Club or Organization Meetings:     Marital Status:    Intimate Partner Violence:     Fear of Current or Ex-Partner:     Emotionally Abused:     Physically Abused:     Sexually Abused:        Family History   Problem Relation Age of Onset    Dementia Father     Dementia Sister        Immunization History   Administered Date(s) Administered    COVID-19, Black Peter, PF, 30mcg/0.3mL 04/02/2021, 04/23/2021    Influenza 10/10/2012    Influenza, Intradermal, Preservative free 12/15/2014    Influenza, Quadv, IM, (6 mo and older Fluzone, Flulaval, Fluarix and 3 yrs and older Afluria) 10/13/2016    Influenza, Quadv, adjuvanted, 65 yrs +, IM, PF (Fluad) 12/17/2020    Influenza, Triv, inactivated, subunit, adjuvanted, IM (Fluad 65 yrs and older) 12/12/2019    Pneumococcal Polysaccharide ([id number]) 12/12/2019       Review of Systems  Review of Systems   Constitutional: Negative for activity change, appetite change, chills, diaphoresis, fatigue, fever and unexpected weight change. HENT: Negative for congestion, dental problem, drooling, ear discharge, ear pain, facial swelling, hearing loss, nosebleeds, postnasal drip, rhinorrhea, sinus pressure, sneezing and tinnitus. Eyes: Negative for photophobia, pain, discharge, redness, itching and visual disturbance. Respiratory: Negative for apnea, cough, choking, chest tightness, shortness of breath, wheezing and stridor. Cardiovascular: Negative for chest pain, palpitations and leg swelling. Gastrointestinal: Negative for abdominal distention, abdominal pain, anal bleeding and blood in stool.    Genitourinary: Negative for decreased urine volume, difficulty urinating, discharge, dysuria, enuresis, flank pain, frequency, genital sores, hematuria, penile pain, penile swelling, scrotal swelling, testicular pain and urgency. Musculoskeletal: Negative for arthralgias, back pain, gait problem, joint swelling, myalgias, neck pain and neck stiffness. Skin: Negative for color change, pallor and rash. Neurological: Negative for dizziness, tremors, seizures, syncope, facial asymmetry, speech difficulty, weakness, light-headedness, numbness and headaches. Hematological: Negative for adenopathy. Does not bruise/bleed easily. Psychiatric/Behavioral: Negative for agitation, behavioral problems, confusion, decreased concentration, dysphoric mood, hallucinations, self-injury and sleep disturbance. The patient is not nervous/anxious and is not hyperactive. Objective:   Physical Exam  Physical Exam  Vitals reviewed. Constitutional:       General: He is not in acute distress. Appearance: He is well-developed. Eyes:      Conjunctiva/sclera: Conjunctivae normal.      Pupils: Pupils are equal, round, and reactive to light. Neck:      Thyroid: No thyromegaly. Vascular: No JVD. Trachea: No tracheal deviation. Cardiovascular:      Rate and Rhythm: Normal rate and regular rhythm. Heart sounds: Normal heart sounds. No murmur heard. No gallop. Pulmonary:      Effort: Pulmonary effort is normal. No respiratory distress. Breath sounds: Normal breath sounds. No stridor. No wheezing or rales. Chest:      Chest wall: No tenderness. Abdominal:      General: Bowel sounds are normal. There is no distension. Palpations: Abdomen is soft. There is no mass. Tenderness: There is no abdominal tenderness. Musculoskeletal:         General: No tenderness. Lymphadenopathy:      Cervical: No cervical adenopathy. Skin:     General: Skin is warm and dry. Coloration: Skin is not pale. Findings: No erythema or rash.    Neurological:      Mental Status: He is alert and oriented

## 2021-10-07 ENCOUNTER — TELEPHONE (OUTPATIENT)
Dept: FAMILY MEDICINE CLINIC | Age: 66
End: 2021-10-07

## 2021-10-07 NOTE — TELEPHONE ENCOUNTER
----- Message from [facility] sent at 10/7/2021  1:19 PM EDT -----  Subject: Message to Provider    QUESTIONS  Information for Provider? Patient wants to know from Kassie Wang is the   lipid panel the only test he is getting done and is it 30 days later from   his appointment on 9/29/21. Patient wants to make sure he is doing the   right thing. Please call patient soon as possible at in regards to his   question at [phone]. Patient said the paperwork is not matching up to   what the doctor told him about getting the lipid. test done.  ---------------------------------------------------------------------------  --------------  CALL BACK INFO  What is the best way for the office to contact you? OK to leave message on   voicemail, OK to respond with electronic message via [employer] portal (only   for patients who have registered [employer] account)  Preferred Call Back Phone Number? [phone]  ---------------------------------------------------------------------------  --------------  SCRIPT ANSWERS  Relationship to Patient?  Self

## 2021-10-08 DIAGNOSIS — E78.2 MIXED HYPERLIPIDEMIA: Primary | ICD-10-CM

## 2021-10-18 ENCOUNTER — FOLLOWUP TELEPHONE ENCOUNTER (OUTPATIENT)
Dept: NEPHROLOGY | Age: 66
End: 2021-10-18

## 2021-10-25 DIAGNOSIS — F40.00 AGORAPHOBIA: ICD-10-CM

## 2021-10-25 RX ORDER — ALPRAZOLAM 1 MG/1
1 TABLET ORAL 3 TIMES DAILY PRN
Qty: 100 TABLET | Refills: 0 | Status: SHIPPED | OUTPATIENT
Start: 2021-10-25 | End: 2021-12-16 | Stop reason: SDUPTHER

## 2021-10-25 NOTE — TELEPHONE ENCOUNTER
Pt called and is requesting rx below:    Dosage must be clarified as to how pt should take the medication it should read as follows (per pt): Take one tablet by mouth 3 times a day and as needed.     LOV: 9/29/21

## 2021-10-28 ENCOUNTER — TELEPHONE (OUTPATIENT)
Dept: FAMILY MEDICINE CLINIC | Age: 66
End: 2021-10-28

## 2021-10-28 DIAGNOSIS — U07.1 COVID: Primary | ICD-10-CM

## 2021-10-28 NOTE — TELEPHONE ENCOUNTER
Pt wants to know can you order the blood work anti body for Kit while he goes and does his blood work today. He state he has had a cold the past week and a half. He has never had a covid test done. He wants to get both test done.

## 2021-10-28 NOTE — TELEPHONE ENCOUNTER
Message given to patient - he'll get the covid test today. He still wants the antibody blood work for his own knowledge. If he is covid + today, how long does he have to wait to get the Black Peter booster?

## 2021-10-28 NOTE — TELEPHONE ENCOUNTER
He needs to get COVID study done(can go to pharmacy first of send to [facility])before he get labs drawn(doesn't need to expose others!)

## 2021-10-28 NOTE — TELEPHONE ENCOUNTER
Talked to patient - he is trying to schedule for a covid test.  If unable to test near his home, he'll call and I'll set him up at [id number]

## 2021-10-29 ENCOUNTER — TELEPHONE (OUTPATIENT)
Dept: FAMILY MEDICINE CLINIC | Age: 66
End: 2021-10-29

## 2021-10-29 ENCOUNTER — NURSE ONLY (OUTPATIENT)
Dept: INTERNAL MEDICINE CLINIC | Age: 66
End: 2021-10-29

## 2021-10-29 DIAGNOSIS — B34.9 VIRAL SYNDROME: ICD-10-CM

## 2021-10-29 DIAGNOSIS — B34.9 VIRAL SYNDROME: Primary | ICD-10-CM

## 2021-10-29 PROBLEM — Z00.00 WELL ADULT EXAM: Status: RESOLVED | Noted: 2021-09-29 | Resolved: 2021-10-29

## 2021-10-29 NOTE — TELEPHONE ENCOUNTER
----- Message from [address] [facility] sent at 10/29/2021 11:44 AM EDT -----  Subject: Message to Provider    Pt states his is not feeling sick anymore. Did   have his Covid test today. He wants to know if he needs to quarantine or can he still go out with a mask on.  ---------------------------------------------------------------------------  OK to leave message on voicemail  Preferred Call Back Phone Number?  [phone]  ----------------------------------------------------------------------

## 2021-10-30 LAB — SARS-COV-2, PCR: NOT DETECTED

## 2021-11-03 DIAGNOSIS — E78.2 MIXED HYPERLIPIDEMIA: ICD-10-CM

## 2021-11-03 DIAGNOSIS — U07.1 COVID: ICD-10-CM

## 2021-11-03 LAB
CHOLESTEROL, TOTAL: 177 MG/DL (ref 0–199)
HDLC SERPL-MCNC: 44 MG/DL (ref 40–60)
LDL CHOLESTEROL CALCULATED: 119 MG/DL
SARS-COV-2 ANTIBODY, TOTAL: NEGATIVE
TRIGL SERPL-MCNC: 72 MG/DL (ref 0–150)
VLDLC SERPL CALC-MCNC: 14 MG/DL

## 2021-11-04 ENCOUNTER — TELEPHONE (OUTPATIENT)
Dept: FAMILY MEDICINE CLINIC | Age: 66
End: 2021-11-04

## 2021-11-04 DIAGNOSIS — E78.2 MIXED HYPERLIPIDEMIA: Primary | ICD-10-CM

## 2021-11-04 NOTE — TELEPHONE ENCOUNTER
Pt is concerned about his LDL being elevated. Should he restart his statin? He has been off x 1 month. I advised him that the numbers were not that elevated but that I would ask. He knows these medications are just bandaids and that he really needs scans done. Please advise    Is also asking when he should get his booster shot. Still not feeling 100% but not feeling horrible. Gets this every year. Please advise.

## 2021-11-05 NOTE — TELEPHONE ENCOUNTER
Message given to patient - CT cardiac calcium scoring order is in Epic. Scheduling number given. He'll research and see if he wants to have it done. Told to get the booster when he feels well and to hold off on statin.

## 2021-11-24 ENCOUNTER — TELEPHONE (OUTPATIENT)
Dept: FAMILY MEDICINE CLINIC | Age: 66
End: 2021-11-24

## 2021-11-24 NOTE — TELEPHONE ENCOUNTER
Tracking# [id number]    Please call for a peer to peer (reviewer peer). Regarding CT calcium cardiac scoring that was denied on 11/21/21. Please call at: [phone] for scheduling.

## 2021-12-16 ENCOUNTER — OFFICE VISIT (OUTPATIENT)
Dept: FAMILY MEDICINE CLINIC | Age: 66
End: 2021-12-16
Payer: COMMERCIAL

## 2021-12-16 VITALS
SYSTOLIC BLOOD PRESSURE: 142 MMHG | BODY MASS INDEX: 27.69 KG/M2 | TEMPERATURE: 97.8 F | OXYGEN SATURATION: 97 % | DIASTOLIC BLOOD PRESSURE: 78 MMHG | WEIGHT: 193 LBS | HEART RATE: 84 BPM

## 2021-12-16 DIAGNOSIS — Z12.11 SCREEN FOR COLON CANCER: ICD-10-CM

## 2021-12-16 DIAGNOSIS — N18.32 STAGE 3B CHRONIC KIDNEY DISEASE (HCC): ICD-10-CM

## 2021-12-16 DIAGNOSIS — F41.9 ANXIETY: ICD-10-CM

## 2021-12-16 DIAGNOSIS — F40.00 AGORAPHOBIA: Primary | ICD-10-CM

## 2021-12-16 DIAGNOSIS — I10 ESSENTIAL HYPERTENSION: ICD-10-CM

## 2021-12-16 DIAGNOSIS — E78.2 MIXED HYPERLIPIDEMIA: ICD-10-CM

## 2021-12-16 PROBLEM — M79.10 MYALGIA: Status: RESOLVED | Noted: 2021-09-29 | Resolved: 2021-12-16

## 2021-12-16 PROBLEM — J45.909 ASTHMATIC BRONCHITIS: Status: RESOLVED | Noted: 2020-09-24 | Resolved: 2021-12-16

## 2021-12-16 PROCEDURE — 4040F PNEUMOC VAC/ADMIN/RCVD: CPT | Performed by: NURSE PRACTITIONER

## 2021-12-16 PROCEDURE — G8484 FLU IMMUNIZE NO ADMIN: HCPCS | Performed by: NURSE PRACTITIONER

## 2021-12-16 PROCEDURE — 99214 OFFICE O/P EST MOD 30 MIN: CPT | Performed by: NURSE PRACTITIONER

## 2021-12-16 PROCEDURE — 3017F COLORECTAL CA SCREEN DOC REV: CPT | Performed by: NURSE PRACTITIONER

## 2021-12-16 PROCEDURE — 1036F TOBACCO NON-USER: CPT | Performed by: NURSE PRACTITIONER

## 2021-12-16 PROCEDURE — G8427 DOCREV CUR MEDS BY ELIG CLIN: HCPCS | Performed by: NURSE PRACTITIONER

## 2021-12-16 PROCEDURE — G8417 CALC BMI ABV UP PARAM F/U: HCPCS | Performed by: NURSE PRACTITIONER

## 2021-12-16 PROCEDURE — 1123F ACP DISCUSS/DSCN MKR DOCD: CPT | Performed by: NURSE PRACTITIONER

## 2021-12-16 RX ORDER — ALPRAZOLAM 1 MG/1
1 TABLET ORAL 3 TIMES DAILY PRN
Qty: 100 TABLET | Refills: 0 | Status: SHIPPED | OUTPATIENT
Start: 2021-12-16 | End: 2021-12-16 | Stop reason: SDUPTHER

## 2021-12-16 RX ORDER — ATORVASTATIN CALCIUM 20 MG/1
20 TABLET, FILM COATED ORAL DAILY
Qty: 30 TABLET | Refills: 3 | Status: SHIPPED | OUTPATIENT
Start: 2021-12-16 | End: 2022-03-17 | Stop reason: SDUPTHER

## 2021-12-16 RX ORDER — ALPRAZOLAM 1 MG/1
1 TABLET ORAL 3 TIMES DAILY PRN
Qty: 100 TABLET | Refills: 0 | Status: SHIPPED | OUTPATIENT
Start: 2021-12-16 | End: 2021-12-18 | Stop reason: SDUPTHER

## 2021-12-16 ASSESSMENT — ENCOUNTER SYMPTOMS: CONSTIPATION: 1

## 2021-12-16 NOTE — PROGRESS NOTES
Krissy Chavez Sr. (:  1955) is a 74 y.o. male,Established patient, here for evaluation of the following chief complaint(s):  Medication Check (follow up)      ASSESSMENT/PLAN:  1. Agoraphobia  Assessment & Plan:  Controlled Substance Monitoring:    Acute and Chronic Pain Monitoring:   RX Monitoring 2021   Attestation -   Periodic Controlled Substance Monitoring Possible medication side effects, risk of tolerance/dependence & alternative treatments discussed. ;No signs of potential drug abuse or diversion identified. ;Assessed functional status. ;Obtaining appropriate analgesic effect of treatment. Chronic Pain > 80 MEDD -          Orders:  -     ALPRAZolam (XANAX) 1 MG tablet; Take 1 tablet by mouth 3 times daily as needed (3 times daily and PRN- dispense 100 tablets for agrophobia) for up to 30 days. , Disp-100 tablet, R-0Normal  2. Mixed hyperlipidemia  -     atorvastatin (LIPITOR) 20 MG tablet; Take 1 tablet by mouth daily, Disp-30 tablet, R-3Normal  3. Screen for colon cancer  -     POCT Fecal Immunochemical Test (FIT); Future  4. Stage 3b chronic kidney disease ([facility] [address]  Assessment & Plan:  Worsening from prior. Encouraged to f/u with nephrology. 5. Essential hypertension  Assessment & Plan:  Not well controlled. To check ambulatory pressures and follow up if not under 130's. Limit salt, No NSAIDs  6. Anxiety  Assessment & Plan:  Pt denies  Cont Xanax      No follow-ups on file. SUBJECTIVE/OBJECTIVE:  Presents for f/u an drefills on xanax. Takes TID every day with occ extra. Is very concerned about having the word anxiety on his pill bottle. Is worried about his stable CKD St3- no longer sees nephrology. Is aware of what meds to avoid  Stopped Lipitor d/t leg cramps but acknowledges he should have a lower LDL d/t DM 2 and family history- will restart. Talks  continuously during interview about past medical history, doctors, conflicting diagnosis.    No complaints today      Current Outpatient Medications   Medication Sig Dispense Refill    atorvastatin (LIPITOR) 20 MG tablet Take 1 tablet by mouth daily 30 tablet 3    ALPRAZolam (XANAX) 1 MG tablet Take 1 tablet by mouth 3 times daily as needed (3 times daily and PRN- dispense 100 tablets for agrophobia) for up to 30 days. 100 tablet 0    famotidine (PEPCID) 20 MG tablet Take 1 tablet by mouth nightly as needed (abd pain) 30 tablet 2    triamcinolone (KENALOG) 0.1 % ointment Apply topically 2 times daily 80 g 0    blood glucose test strips (TRUE METRIX BLOOD GLUCOSE TEST) strip USE TO TEST 3-4 TIMES DAILY AS NEEDED 400 strip 5    TRUEplus Lancets 33G MISC USE three to  FOUR TIMES A  each 5    ULORIC 40 MG TABS tablet TAKE ONE TABLET BY MOUTH DAILY 30 tablet 11    aspirin 325 MG EC tablet Take 325 mg by mouth daily       No current facility-administered medications for this visit. The 10-year ASCVD risk score (Mila Greenfield, et al., 2013) is: 32.6%    Values used to calculate the score:      Age: 74 years      Sex: Male      Is Non- : No      Diabetic: Yes      Tobacco smoker: No      Systolic Blood Pressure: 726 mmHg      Is BP treated: No      HDL Cholesterol: 44 mg/dL      Total Cholesterol: 177 mg/dL    Review of Systems   Gastrointestinal: Positive for constipation. All other systems reviewed and are negative. Vitals:    12/16/21 1322   BP: (!) 142/78   Site: Left Upper Arm   Position: Sitting   Cuff Size: Medium Adult   Pulse: 84   Temp: 97.8 °F (36.6 °C)   SpO2: 97%   Weight: 193 lb (87.5 kg)       Physical Exam  Constitutional:       Appearance: He is well-developed. HENT:      Head: Normocephalic. Eyes:      Pupils: Pupils are equal, round, and reactive to light. Cardiovascular:      Rate and Rhythm: Normal rate and regular rhythm. Heart sounds: Normal heart sounds. Pulmonary:      Effort: Pulmonary effort is normal.      Breath sounds: Normal breath sounds.    Musculoskeletal:

## 2021-12-16 NOTE — ASSESSMENT & PLAN NOTE
Controlled Substance Monitoring:    Acute and Chronic Pain Monitoring:   RX Monitoring 12/16/2021   Attestation -   Periodic Controlled Substance Monitoring Possible medication side effects, risk of tolerance/dependence & alternative treatments discussed. ;No signs of potential drug abuse or diversion identified. ;Assessed functional status. ;Obtaining appropriate analgesic effect of treatment.    Chronic Pain > 80 MEDD -

## 2021-12-16 NOTE — ASSESSMENT & PLAN NOTE
Not well controlled. To check ambulatory pressures and follow up if not under 130's.   Limit salt, No NSAIDs

## 2021-12-17 ENCOUNTER — TELEPHONE (OUTPATIENT)
Dept: FAMILY MEDICINE CLINIC | Age: 66
End: 2021-12-17

## 2021-12-17 DIAGNOSIS — F40.00 AGORAPHOBIA: ICD-10-CM

## 2021-12-17 NOTE — TELEPHONE ENCOUNTER
You prescribed the alprazolam - he usually gets 2 refills so he doesn't have to call - can you please call the pharmacist and add the refills?

## 2021-12-18 RX ORDER — ALPRAZOLAM 1 MG/1
1 TABLET ORAL 3 TIMES DAILY PRN
Qty: 100 TABLET | Refills: 2 | Status: SHIPPED | OUTPATIENT
Start: 2021-12-18 | End: 2022-01-17

## 2022-01-15 PROBLEM — Z12.11 SCREEN FOR COLON CANCER: Status: RESOLVED | Noted: 2021-09-29 | Resolved: 2022-01-15

## 2022-01-18 DIAGNOSIS — K21.9 GASTROESOPHAGEAL REFLUX DISEASE WITHOUT ESOPHAGITIS: ICD-10-CM

## 2022-01-18 RX ORDER — FAMOTIDINE 20 MG/1
TABLET, FILM COATED ORAL
Qty: 30 TABLET | Refills: 2 | Status: SHIPPED | OUTPATIENT
Start: 2022-01-18 | End: 2022-03-17

## 2022-01-20 DIAGNOSIS — R19.5 POSITIVE FIT (FECAL IMMUNOCHEMICAL TEST): Primary | ICD-10-CM

## 2022-01-20 NOTE — PROGRESS NOTES
+ FIT test  Referral placed to [facility] for scope  Pt hesitant d/t concerns of CKD and hemorrhoids  Encouraged to call [facility] office to discuss. Number given. He will follow up.

## 2022-01-24 ENCOUNTER — OFFICE VISIT (OUTPATIENT)
Dept: SURGERY | Age: 67
End: 2022-01-24
Payer: COMMERCIAL

## 2022-01-24 VITALS
HEIGHT: 70 IN | HEART RATE: 73 BPM | DIASTOLIC BLOOD PRESSURE: 84 MMHG | BODY MASS INDEX: 27.54 KG/M2 | WEIGHT: 192.4 LBS | OXYGEN SATURATION: 97 % | SYSTOLIC BLOOD PRESSURE: 128 MMHG | RESPIRATION RATE: 18 BRPM | TEMPERATURE: 97.7 F

## 2022-01-24 DIAGNOSIS — C18.9 MALIGNANT NEOPLASM OF COLON, UNSPECIFIED PART OF COLON (HCC): Primary | ICD-10-CM

## 2022-01-24 PROCEDURE — G8484 FLU IMMUNIZE NO ADMIN: HCPCS | Performed by: SURGERY

## 2022-01-24 PROCEDURE — G8417 CALC BMI ABV UP PARAM F/U: HCPCS | Performed by: SURGERY

## 2022-01-24 PROCEDURE — G8427 DOCREV CUR MEDS BY ELIG CLIN: HCPCS | Performed by: SURGERY

## 2022-01-24 PROCEDURE — 99243 OFF/OP CNSLTJ NEW/EST LOW 30: CPT | Performed by: SURGERY

## 2022-01-24 RX ORDER — ALPRAZOLAM 1 MG/1
1 TABLET ORAL 3 TIMES DAILY PRN
COMMUNITY
End: 2022-03-17 | Stop reason: SDUPTHER

## 2022-01-24 RX ORDER — MULTIVIT WITH MINERALS/LUTEIN
250 TABLET ORAL DAILY
COMMUNITY

## 2022-01-24 RX ORDER — BIOTIN 1 MG
1000 TABLET ORAL DAILY
COMMUNITY

## 2022-01-24 RX ORDER — OMEGA-3S/DHA/EPA/FISH OIL/D3 300MG-1000
400 CAPSULE ORAL DAILY
COMMUNITY

## 2022-01-24 NOTE — PROGRESS NOTES
Subjective:     Patient is a 74 y.o. man referred for colon screening     The patient is referred by Timmy GIBBONS. Results of consultation will be shared via electronic medical record      HPI: Mr. Camara Me is here to discuss colonoscopy. Recent FIT test was positive. He reports some constipation but no linh blood. Thinks he might have a hemorrhoid. No FH of colon cancer     Patient Active Problem List    Diagnosis Date Noted    Cholecystitis     Mixed hyperlipidemia 2016    CKD (chronic kidney disease) stage 3, GFR 30-59 ml/min ([facility] [address] 2016    Type 2 diabetes mellitus with diabetic nephropathy ([facility] [address] 2015    Rosacea 2012    Anxiety     Umbilical hernia     Agoraphobia 2011    Candidiasis of other urogenital sites 2010    Essential hypertension 2009     Past Medical History:   Diagnosis Date    Anxiety     HTN (hypertension)     Hyperlipemia     Panic disorder     Type II or unspecified type diabetes mellitus without mention of complication, not stated as uncontrolled     Umbilical hernia       Past Surgical History:   Procedure Laterality Date    CHOLECYSTECTOMY, LAPAROSCOPIC  2016    with gram    NASAL POLYP SURGERY      SINUS SURGERY      TONSILLECTOMY      UMBILICAL HERNIA REPAIR  2016      Not in a hospital admission. Allergies   Allergen Reactions    Cephalexin     Crestor [Rosuvastatin] Other (See Comments)     myalgias    Levofloxacin     Penicillins     Succinylcholine      Does not want. Pt son had reaction to Succs and almost , pt was told not to ever take Succs.     Sulfa Antibiotics     Tetracyclines & Related     Verapamil     Vicodin [Hydrocodone-Acetaminophen]      Rash; ithching      Social History     Tobacco Use    Smoking status: Former Smoker     Packs/day: 1.00     Years: 15.00     Pack years: 15.00     Types: Cigarettes     Quit date: 1995     Years since quittin.7    Smokeless tobacco: Never Used   Substance Use Topics    Alcohol use: Yes     Alcohol/week: 0.0 standard drinks     Comment: social      Family History   Problem Relation Age of Onset    Dementia Father     Dementia Sister       Review of Systems    GEN: reviewed and negative except as noted in HPI. GI: reviewed and negative except as noted in HPI. Objective:     GEN: appears well, no distress, appears stated age  PSYCH: normal mood, normal affect  NECK: no neck masses, trachea midline  ENT: moist oral mucosa; anicteric  SKIN: no rash or jaundice  CV: regular heart rate and rhythm  PULM: normal respiratory effort, no wheezing  GI: soft non tender abdomen.  Normal bowel sounds  RECTAL: deferred   EXT/NEURO: normal gait, strength/sensation grossly intact in all extremities      Assessment:     Colon screen, positive FIT test     Plan:     Discussed in light of positive FIT test I recommend colonoscopy    Will schedule    Sadie Gabriel M.D.  1/24/22   3:04 PM

## 2022-02-07 ENCOUNTER — TELEPHONE (OUTPATIENT)
Dept: SURGERY | Age: 67
End: 2022-02-07

## 2022-02-07 ENCOUNTER — TELEPHONE (OUTPATIENT)
Dept: FAMILY MEDICINE CLINIC | Age: 67
End: 2022-02-07

## 2022-02-07 NOTE — TELEPHONE ENCOUNTER
Scheduled for colonoscopy 2/24. They want him to stop his aspirin - 7 days before.    Do not take atorvastatin 24 hours before

## 2022-02-07 NOTE — TELEPHONE ENCOUNTER
Spent 37 minutes on the phone with this patient. Patient has been scheduled for:    Procedure: colonoscopy  Date: 2/24/22  Time: 10:45AM  Arrival: 9:15AM  [facility]  [facility]?: Aspirin 7 days   Prep? Colon prep given in office, discussed over phone     Pre-op? N/A    Post-op Appt? N/A     Patient advised they will need a . Orders routed to surgery scheduling.     Instructions have been mailed to:  Leilani Lara [address]

## 2022-02-07 NOTE — TELEPHONE ENCOUNTER
Pt called wanting to clarify information about getting scheduled of colonoscopy. He  Does not like getting information through email or text message. Please call pt to clarify information. Call [phone].

## 2022-02-21 ENCOUNTER — TELEPHONE (OUTPATIENT)
Dept: FAMILY MEDICINE CLINIC | Age: 67
End: 2022-02-21

## 2022-02-21 ENCOUNTER — NURSE TRIAGE (OUTPATIENT)
Dept: OTHER | Facility: CLINIC | Age: 67
End: 2022-02-21

## 2022-02-21 DIAGNOSIS — K05.20 ACUTE PERIODONTITIS: ICD-10-CM

## 2022-02-21 DIAGNOSIS — K04.7 DENTAL ABSCESS: Primary | ICD-10-CM

## 2022-02-21 RX ORDER — CHLORHEXIDINE GLUCONATE 0.12 MG/ML
15 RINSE ORAL 2 TIMES DAILY
Qty: 420 ML | Refills: 5 | Status: SHIPPED | OUTPATIENT
Start: 2022-02-21 | End: 2022-09-20 | Stop reason: SDUPTHER

## 2022-02-21 RX ORDER — AZITHROMYCIN 250 MG/1
250 TABLET, FILM COATED ORAL SEE ADMIN INSTRUCTIONS
Qty: 6 TABLET | Refills: 0 | Status: SHIPPED | OUTPATIENT
Start: 2022-02-21 | End: 2022-02-26

## 2022-02-21 RX ORDER — CHLORHEXIDINE GLUCONATE 0.12 MG/ML
15 RINSE ORAL 2 TIMES DAILY
Qty: 420 ML | Refills: 0 | Status: SHIPPED | OUTPATIENT
Start: 2022-02-21 | End: 2022-02-21 | Stop reason: SDUPTHER

## 2022-02-21 NOTE — TELEPHONE ENCOUNTER
----- Message from Kirti Bhavik sent at 2/21/2022  1:18 PM EST -----  Subject: Message to Provider    QUESTIONS  Information for Provider? The pt called requesting antibiotics to treat a   abscess that he has in his mouth. He doesn't have a dentist, he has a   colonoscopy coming up and would like this treated before his colonoscopy. He gets the colon cleanse 2/24 and the colonoscopy the next day 2/25.   ---------------------------------------------------------------------------  --------------  CALL BACK INFO  What is the best way for the office to contact you? OK to leave message on   voicemail  Preferred Call Back Phone Number? [phone]  ---------------------------------------------------------------------------  --------------  SCRIPT ANSWERS  Relationship to Patient?  Self

## 2022-02-21 NOTE — TELEPHONE ENCOUNTER
Patient says he does not want to schedule a visit, he would like a z-pack called in for him. Patient says he has a colonoscopy scheduled for 2/24/2022 and does not have time to see a dentist about his mouth pain. Received call from [facility]- SUSHIL with [employer] Complaint. Current Symptoms: mouth pain - right top    Denies - puss / drainage / swelling / difficulty swallowing     Onset: \"It has been happening for a long time\"      Pain Severity: 5/10; aching; intermittent    Temperature: Denies      What has been tried: chlorhexidine rinse    Recommended disposition: See PCP within 3 days. Care advice provided, patient verbalizes understanding; denies any other questions or concerns; instructed to call back for any new or worsening symptoms. Patient/caller agrees to follow-up with PCP      Attention Provider: Thank you for allowing me to participate in the care of your patient. The patient was connected to triage in response to information provided to the ECC/PSC. Please do not respond through this encounter as the response is not directed to a shared pool.           Reason for Disposition   [1] MILD-MODERATE mouth pain AND [2] present > 3 days    Protocols used: MOUTH PAIN-ADULT-AH

## 2022-02-21 NOTE — TELEPHONE ENCOUNTER
Pt is requesting rx below:    chlorhexidine ([facility]) 0.12 % solution      Pharmacy    [facility] [address] [id number]   [facility], [address] [place] [address]   Phone:  [phone]  Fax:  [phone]       LOV: 12/16/21

## 2022-02-21 NOTE — TELEPHONE ENCOUNTER
Also requesting Chlorhexidine (Peridex) 0.12%  because his gums are in inflamed. Rinse bid. Requesting a pint. (refills if possible).

## 2022-02-23 ENCOUNTER — ANESTHESIA EVENT (OUTPATIENT)
Dept: ENDOSCOPY | Age: 67
End: 2022-02-23
Payer: COMMERCIAL

## 2022-02-24 ENCOUNTER — TELEPHONE (OUTPATIENT)
Dept: SURGERY | Age: 67
End: 2022-02-24

## 2022-02-24 ENCOUNTER — ANESTHESIA (OUTPATIENT)
Dept: ENDOSCOPY | Age: 67
End: 2022-02-24
Payer: COMMERCIAL

## 2022-02-24 ENCOUNTER — HOSPITAL ENCOUNTER (OUTPATIENT)
Age: 67
Setting detail: OUTPATIENT SURGERY
Discharge: HOME | End: 2022-02-24
Attending: SURGERY | Admitting: SURGERY
Payer: COMMERCIAL

## 2022-02-24 VITALS
SYSTOLIC BLOOD PRESSURE: 112 MMHG | WEIGHT: 192 LBS | TEMPERATURE: 98.3 F | HEIGHT: 71 IN | BODY MASS INDEX: 26.88 KG/M2 | OXYGEN SATURATION: 100 % | HEART RATE: 78 BPM | RESPIRATION RATE: 18 BRPM | DIASTOLIC BLOOD PRESSURE: 73 MMHG

## 2022-02-24 VITALS
OXYGEN SATURATION: 97 % | SYSTOLIC BLOOD PRESSURE: 112 MMHG | RESPIRATION RATE: 15 BRPM | DIASTOLIC BLOOD PRESSURE: 60 MMHG

## 2022-02-24 DIAGNOSIS — Z12.11 SCREENING FOR COLON CANCER: ICD-10-CM

## 2022-02-24 PROCEDURE — 6360000002 HC RX W HCPCS: Performed by: NURSE ANESTHETIST, CERTIFIED REGISTERED

## 2022-02-24 PROCEDURE — 3609010300 HC COLONOSCOPY W/BIOPSY SINGLE/MULTIPLE: Performed by: SURGERY

## 2022-02-24 PROCEDURE — 45380 COLONOSCOPY AND BIOPSY: CPT | Performed by: SURGERY

## 2022-02-24 PROCEDURE — 3700000001 HC ADD 15 MINUTES (ANESTHESIA): Performed by: SURGERY

## 2022-02-24 PROCEDURE — 7100000011 HC PHASE II RECOVERY - ADDTL 15 MIN: Performed by: SURGERY

## 2022-02-24 PROCEDURE — 88305 TISSUE EXAM BY PATHOLOGIST: CPT

## 2022-02-24 PROCEDURE — 2500000003 HC RX 250 WO HCPCS: Performed by: NURSE ANESTHETIST, CERTIFIED REGISTERED

## 2022-02-24 PROCEDURE — 7100000010 HC PHASE II RECOVERY - FIRST 15 MIN: Performed by: SURGERY

## 2022-02-24 PROCEDURE — 3700000000 HC ANESTHESIA ATTENDED CARE: Performed by: SURGERY

## 2022-02-24 PROCEDURE — 2580000003 HC RX 258: Performed by: NURSE ANESTHETIST, CERTIFIED REGISTERED

## 2022-02-24 PROCEDURE — 2580000003 HC RX 258: Performed by: ANESTHESIOLOGY

## 2022-02-24 PROCEDURE — 2709999900 HC NON-CHARGEABLE SUPPLY: Performed by: SURGERY

## 2022-02-24 RX ORDER — SODIUM CHLORIDE, SODIUM LACTATE, POTASSIUM CHLORIDE, CALCIUM CHLORIDE 600; 310; 30; 20 MG/100ML; MG/100ML; MG/100ML; MG/100ML
INJECTION, SOLUTION INTRAVENOUS CONTINUOUS
Status: DISCONTINUED | OUTPATIENT
Start: 2022-02-24 | End: 2022-02-24 | Stop reason: HOSPADM

## 2022-02-24 RX ORDER — PROPOFOL 10 MG/ML
INJECTION, EMULSION INTRAVENOUS PRN
Status: DISCONTINUED | OUTPATIENT
Start: 2022-02-24 | End: 2022-02-24 | Stop reason: SDUPTHER

## 2022-02-24 RX ORDER — LIDOCAINE HYDROCHLORIDE 10 MG/ML
1 INJECTION, SOLUTION EPIDURAL; INFILTRATION; INTRACAUDAL; PERINEURAL
Status: DISCONTINUED | OUTPATIENT
Start: 2022-02-24 | End: 2022-02-24 | Stop reason: HOSPADM

## 2022-02-24 RX ORDER — LIDOCAINE HYDROCHLORIDE 20 MG/ML
INJECTION, SOLUTION EPIDURAL; INFILTRATION; INTRACAUDAL; PERINEURAL PRN
Status: DISCONTINUED | OUTPATIENT
Start: 2022-02-24 | End: 2022-02-24 | Stop reason: SDUPTHER

## 2022-02-24 RX ORDER — SODIUM CHLORIDE 9 MG/ML
25 INJECTION, SOLUTION INTRAVENOUS PRN
Status: DISCONTINUED | OUTPATIENT
Start: 2022-02-24 | End: 2022-02-24 | Stop reason: HOSPADM

## 2022-02-24 RX ORDER — SODIUM CHLORIDE 0.9 % (FLUSH) 0.9 %
5-40 SYRINGE (ML) INJECTION PRN
Status: DISCONTINUED | OUTPATIENT
Start: 2022-02-24 | End: 2022-02-24 | Stop reason: HOSPADM

## 2022-02-24 RX ORDER — PROPOFOL 10 MG/ML
INJECTION, EMULSION INTRAVENOUS CONTINUOUS PRN
Status: DISCONTINUED | OUTPATIENT
Start: 2022-02-24 | End: 2022-02-24 | Stop reason: SDUPTHER

## 2022-02-24 RX ORDER — SODIUM CHLORIDE, SODIUM LACTATE, POTASSIUM CHLORIDE, CALCIUM CHLORIDE 600; 310; 30; 20 MG/100ML; MG/100ML; MG/100ML; MG/100ML
INJECTION, SOLUTION INTRAVENOUS CONTINUOUS PRN
Status: DISCONTINUED | OUTPATIENT
Start: 2022-02-24 | End: 2022-02-24 | Stop reason: SDUPTHER

## 2022-02-24 RX ORDER — SODIUM CHLORIDE 0.9 % (FLUSH) 0.9 %
5-40 SYRINGE (ML) INJECTION EVERY 12 HOURS SCHEDULED
Status: DISCONTINUED | OUTPATIENT
Start: 2022-02-24 | End: 2022-02-24 | Stop reason: HOSPADM

## 2022-02-24 RX ADMIN — PROPOFOL 30 MG: 10 INJECTION, EMULSION INTRAVENOUS at 11:21

## 2022-02-24 RX ADMIN — LIDOCAINE HYDROCHLORIDE 100 MG: 20 INJECTION, SOLUTION EPIDURAL; INFILTRATION; INTRACAUDAL; PERINEURAL at 11:19

## 2022-02-24 RX ADMIN — SODIUM CHLORIDE, SODIUM LACTATE, POTASSIUM CHLORIDE, AND CALCIUM CHLORIDE: .6; .31; .03; .02 INJECTION, SOLUTION INTRAVENOUS at 11:14

## 2022-02-24 RX ADMIN — SODIUM CHLORIDE, POTASSIUM CHLORIDE, SODIUM LACTATE AND CALCIUM CHLORIDE: 600; 310; 30; 20 INJECTION, SOLUTION INTRAVENOUS at 10:31

## 2022-02-24 RX ADMIN — PROPOFOL 150 MCG/KG/MIN: 10 INJECTION, EMULSION INTRAVENOUS at 11:19

## 2022-02-24 RX ADMIN — PROPOFOL 50 MG: 10 INJECTION, EMULSION INTRAVENOUS at 11:19

## 2022-02-24 ASSESSMENT — PULMONARY FUNCTION TESTS
PIF_VALUE: 1

## 2022-02-24 ASSESSMENT — PAIN - FUNCTIONAL ASSESSMENT
PAIN_FUNCTIONAL_ASSESSMENT: FACES
PAIN_FUNCTIONAL_ASSESSMENT: 0-10
PAIN_FUNCTIONAL_ASSESSMENT: 0-10

## 2022-02-24 NOTE — ANESTHESIA POSTPROCEDURE EVALUATION
Department of Anesthesiology  Postprocedure Note    Patient: Coretta Sharpe Sr. MRN: [id number]  YOB: 1955  Date of evaluation: 2/24/2022  Time:  2:40 PM     Procedure Summary     Date: 02/24/22 Room / Location: [address] [place] [address] Bony Licona  / [facility]    Anesthesia Start: 1114 Anesthesia Stop: 1152    Procedure: COLONOSCOPY WITH BIOPSY (N/A ) Diagnosis:       Screening for colon cancer      (Screening for colon cancer [Z12.11])    Surgeons: Jeanie Cordova MD Responsible Provider: Carissa Lauren DO    Anesthesia Type: MAC ASA Status: 3          Anesthesia Type: MAC    Danny Phase I: Danny Score: 10    Danny Phase II: Danny Score: 10    Last vitals: Reviewed and per EMR flowsheets.        Anesthesia Post Evaluation    Patient location during evaluation: PACU  Patient participation: complete - patient participated  Level of consciousness: awake and alert  Pain score: 0  Airway patency: patent  Nausea & Vomiting: no nausea and no vomiting  Complications: no  Cardiovascular status: hemodynamically stable  Respiratory status: acceptable  Hydration status: stable

## 2022-02-24 NOTE — ANESTHESIA PRE PROCEDURE
Department of Anesthesiology  Preprocedure Note       Name:  Emiliano Caldwell   Age:  74 y.o.  :  1955                                          MRN:  [id number]         Date:  2022      Surgeon: Rob Salter):  Franco Norwood MD    Procedure: Procedure(s):  COLONOSCOPY, POSSIBLE POLYPECTOMY    Medications prior to admission:   Prior to Admission medications    Medication Sig Start Date End Date Taking? Authorizing Provider   azithromycin (ZITHROMAX) 250 MG tablet Take 1 tablet by mouth See Admin Instructions for 5 days 500mg on day 1 followed by 250mg on days 2 - 5 22  Rosenda Pascual MD   chlorhexidine (PERIDEX) 0.12 % solution Take 15 mLs by mouth 2 times daily for 14 days 2/21/22 3/7/22  Rosenda Pascual MD   ALPRAZolam Celsa Riling) 1 MG tablet Take 1 mg by mouth 3 times daily as needed for Sleep.     Historical Provider, MD   vitamin D3 (CHOLECALCIFEROL) 10 MCG (400 UNIT) TABS tablet Take 400 Units by mouth daily    Historical Provider, MD   Ascorbic Acid (VITAMIN C) 250 MG tablet Take 250 mg by mouth daily    Historical Provider, MD   Biotin 1000 MCG TABS Take 1,000 mcg by mouth daily    Historical Provider, MD   famotidine (PEPCID) 20 MG tablet TAKE ONE TABLET BY MOUTH EVERY NIGHT AS NEEDED (ABDOMINAL PAIN) 22   Rosenda Pascual MD   atorvastatin (LIPITOR) 20 MG tablet Take 1 tablet by mouth daily 21   SHAI Henley - CNP   triamcinolone (KENALOG) 0.1 % ointment Apply topically 2 times daily 21   Rosenda Pascual MD   blood glucose test strips (TRUE METRIX BLOOD GLUCOSE TEST) strip USE TO TEST 3-4 TIMES DAILY AS NEEDED 21   Terri Benítez MD   TRUEplus Lancets 33G MISC USE three to  FOUR TIMES A DAY 21   Terri Benítez MD   ULORIC 40 MG TABS tablet TAKE ONE TABLET BY MOUTH DAILY 21   Reese Alaniz MD   aspirin 325 MG EC tablet Take 325 mg by mouth daily    Historical Provider, MD       Current medications:    Current Facility-Administered Medications   Medication Dose Route Frequency Provider Last Rate Last Admin    lactated ringers infusion   IntraVENous Continuous Yessenia Vasquez MD        sodium chloride flush 0.9 % injection 5-40 mL  5-40 mL IntraVENous 2 times per day Yessenia Vasquez MD        sodium chloride flush 0.9 % injection 5-40 mL  5-40 mL IntraVENous PRN Yessenia Vasquez MD        0.9 % sodium chloride infusion  25 mL IntraVENous PRN Yessenia Vasquez MD        lidocaine PF 1 % injection 1 mL  1 mL IntraDERmal Once PRN Yessenia Vasquez MD           Allergies: Allergies   Allergen Reactions    Cephalexin     Crestor [Rosuvastatin] Other (See Comments)     myalgias    Levofloxacin     Penicillins     Succinylcholine      Does not want. Pt son had reaction to Succs and almost , pt was told not to ever take Succs.     Sulfa Antibiotics     Tetracyclines & Related     Verapamil     Vicodin [Hydrocodone-Acetaminophen]      Rash; ithching       Problem List:    Patient Active Problem List   Diagnosis Code    Agoraphobia F40.00    Anxiety R51.3    Umbilical hernia D63.6    Rosacea L71.9    Type 2 diabetes mellitus with diabetic nephropathy ([facility] [address] E11.21    Essential hypertension I10    Candidiasis of other urogenital sites B37.49    CKD (chronic kidney disease) stage 3, GFR 30-59 ml/min (HCC) N18.30    Mixed hyperlipidemia E78.2    Cholecystitis K81.9       Past Medical History:        Diagnosis Date    Anxiety     HTN (hypertension)     Hyperlipemia     Panic disorder     Type II or unspecified type diabetes mellitus without mention of complication, not stated as uncontrolled     Umbilical hernia        Past Surgical History:        Procedure Laterality Date    CHOLECYSTECTOMY, LAPAROSCOPIC  2016    with gram    NASAL POLYP SURGERY      SINUS SURGERY      TONSILLECTOMY      UMBILICAL HERNIA REPAIR  2016       Social History:    Social History     Tobacco Use    Smoking status: Former Smoker     Packs/day: 1.00     Years: 15.00     Pack years: 15.00     Types: Cigarettes     Quit date: 1995     Years since quittin.8    Smokeless tobacco: Never Used   Substance Use Topics    Alcohol use: Yes     Alcohol/week: 0.0 standard drinks     Comment: social                                Counseling given: Not Answered      Vital Signs (Current): There were no vitals filed for this visit. BP Readings from Last 3 Encounters:   22 128/84   21 (!) 142/78   21 126/74       NPO Status:                                                                                 BMI:   Wt Readings from Last 3 Encounters:   22 192 lb 6.4 oz (87.3 kg)   21 193 lb (87.5 kg)   21 192 lb (87.1 kg)     There is no height or weight on file to calculate BMI.    CBC:   Lab Results   Component Value Date    WBC 6.5 2019    RBC 4.64 2019    HGB 14.5 2019    HCT 43.1 2019    MCV 93.0 2019    RDW 12.8 2019     2019       CMP:   Lab Results   Component Value Date     2021    K 4.6 2021     2021    CO2 25 2021    BUN 31 2021    CREATININE 1.7 2021    GFRAA 49 2021    GFRAA >60 2013    AGRATIO 1.6 2021    LABGLOM 40 2021    LABGLOM 92.4 2011    GLUCOSE 116 2021    GLUCOSE 226 2011    PROT 7.3 2021    PROT 7.1 2013    CALCIUM 9.6 2021    BILITOT 1.1 2021    ALKPHOS 74 2021    AST 22 2021    ALT 20 2021       POC Tests: No results for input(s): POCGLU, POCNA, POCK, POCCL, POCBUN, POCHEMO, POCHCT in the last 72 hours.     Coags: No results found for: PROTIME, INR, APTT    HCG (If Applicable): No results found for: PREGTESTUR, PREGSERUM, HCG, HCGQUANT     ABGs: No results found for: PHART, PO2ART, QJS4LKE, HUD3OOI, BEART, N8ILZOJC     Type & Screen (If Applicable):  No results found for: LABABO, LABRH    Drug/Infectious Status (If Applicable):  No results found for: HIV, HEPCAB    COVID-19 Screening (If Applicable):   Lab Results   Component Value Date    COVID19 Negative 11/03/2021    COVID19 Not Detected 10/29/2021           Anesthesia Evaluation  Patient summary reviewed and Nursing notes reviewed no history of anesthetic complications:   Airway: Mallampati: II  TM distance: >3 FB   Neck ROM: full  Mouth opening: > = 3 FB Dental:    (+) implants      Pulmonary:Negative Pulmonary ROS and normal exam                               Cardiovascular:  Exercise tolerance: good (>4 METS),   (+) hypertension:,     (-)  angina, orthopnea and PND    ECG reviewed               Beta Blocker:  Not on Beta Blocker      ROS comment: Normal left ventricle size, wall thickness, and systolic function with an   estimated ejection fraction of 60-65%. No regional wall motion abnormalities   are seen. Normal right ventricular size and function. No significant valve abnormalities noted. Neuro/Psych:   Negative Neuro/Psych ROS  (+) psychiatric history:            GI/Hepatic/Renal: Neg GI/Hepatic/Renal ROS            Endo/Other: Negative Endo/Other ROS   (+) DiabetesType II DM, well controlled, , .                 Abdominal:         (-) obese Abdomen: soft. Vascular: negative vascular ROS. Other Findings:             Anesthesia Plan      MAC     ASA 3       Induction: intravenous. Anesthetic plan and risks discussed with patient. Use of blood products discussed with patient whom consented to blood products. Plan discussed with CRNA and attending.                   Joselyn Kidd DO   2/24/2022

## 2022-02-24 NOTE — H&P
Subjective:     Patient is a 74 y.o. man here for colonoscopy after positive stool test     HPI: No changes since seen in office. No abdominal pain today. Thinks he may have a had a second of chest pain but it resolved and he thinks it is anxiety related.      Patient Active Problem List    Diagnosis Date Noted    Cholecystitis     Mixed hyperlipidemia 06/02/2016    CKD (chronic kidney disease) stage 3, GFR 30-59 ml/min ([facility]) 03/17/2016    Type 2 diabetes mellitus with diabetic nephropathy ([facility] [address] 08/13/2015    Rosacea 02/22/2012    Anxiety     Umbilical hernia     Agoraphobia 05/04/2011    Candidiasis of other urogenital sites 09/30/2010    Essential hypertension 07/27/2009     Past Medical History:   Diagnosis Date    Anxiety     HTN (hypertension)     Hyperlipemia     Panic disorder     Type II or unspecified type diabetes mellitus without mention of complication, not stated as uncontrolled     Umbilical hernia       Past Surgical History:   Procedure Laterality Date    CHOLECYSTECTOMY, LAPAROSCOPIC  09/16/2016    with gram    NASAL POLYP SURGERY      SINUS SURGERY      TONSILLECTOMY      UMBILICAL HERNIA REPAIR  09/16/2016      Medications Prior to Admission: azithromycin (ZITHROMAX) 250 MG tablet, Take 1 tablet by mouth See Admin Instructions for 5 days 500mg on day 1 followed by 250mg on days 2 - 5  chlorhexidine (PERIDEX) 0.12 % solution, Take 15 mLs by mouth 2 times daily for 14 days  ALPRAZolam (XANAX) 1 MG tablet, Take 1 mg by mouth 3 times daily as needed for Sleep.  vitamin D3 (CHOLECALCIFEROL) 10 MCG (400 UNIT) TABS tablet, Take 400 Units by mouth daily  Ascorbic Acid (VITAMIN C) 250 MG tablet, Take 250 mg by mouth daily  Biotin 1000 MCG TABS, Take 1,000 mcg by mouth daily  famotidine (PEPCID) 20 MG tablet, TAKE ONE TABLET BY MOUTH EVERY NIGHT AS NEEDED (ABDOMINAL PAIN)  atorvastatin (LIPITOR) 20 MG tablet, Take 1 tablet by mouth daily  triamcinolone (KENALOG) 0.1 % ointment, Apply topically 2 times daily  blood glucose test strips (TRUE METRIX BLOOD GLUCOSE TEST) strip, USE TO TEST 3-4 TIMES DAILY AS NEEDED  TRUEplus Lancets 33G MISC, USE three to  FOUR TIMES A DAY  ULORIC 40 MG TABS tablet, TAKE ONE TABLET BY MOUTH DAILY  aspirin 325 MG EC tablet, Take 325 mg by mouth daily  Allergies   Allergen Reactions    Cephalexin     Crestor [Rosuvastatin] Other (See Comments)     myalgias    Levofloxacin     Penicillins     Succinylcholine      Does not want. Pt son had reaction to Succs and almost , pt was told not to ever take Succs.  Sulfa Antibiotics     Tetracyclines & Related     Verapamil     Vicodin [Hydrocodone-Acetaminophen]      Rash; ithching      Social History     Tobacco Use    Smoking status: Former Smoker     Packs/day: 1.00     Years: 15.00     Pack years: 15.00     Types: Cigarettes     Quit date: 1995     Years since quittin.8    Smokeless tobacco: Never Used   Substance Use Topics    Alcohol use: Yes     Alcohol/week: 0.0 standard drinks     Comment: social      Family History   Problem Relation Age of Onset    Dementia Father     Dementia Sister       Review of Systems    GEN: reviewed and negative except as noted in HPI. GI: reviewed and negative except as noted in HPI. Objective:     Patient Vitals for the past 8 hrs:   BP Temp Temp src Pulse Resp SpO2 Height Weight   22 1009 (!) 160/77 98 °F (36.7 °C) Oral 95 18 100 % 5' 10.5\" (1.791 m) 192 lb (87.1 kg)     GEN: appears well, no distress, appears stated age  PSYCH: normal mood, normal affect  NECK: no neck masses, trachea midline  ENT: moist oral mucosa; anicteric  SKIN: no rash or jaundice  CV: regular heart rate and rhythm  PULM: normal respiratory effort, no wheezing  GI: soft non tender abdomen. Normal bowel sounds  RECTAL: deferred   EXT/NEURO: normal gait, strength/sensation grossly intact in all extremities      Assessment:     Colon screen: positive FIT test     Plan:      The risks, benefits, and alternatives of colonoscopy were discussed. The risks include bleeding and the very low risk of perforation which might require surgery to fix. May need additional operation/treatment based on findings. Patient understands and wishes to proceed.      Lata Kang M.D.  2/24/22   10:31 AM

## 2022-02-24 NOTE — ANESTHESIA POSTPROCEDURE EVALUATION
Department of Anesthesiology  Postprocedure Note    Patient: Dilip Carmen Sr. MRN: [id number]  YOB: 1955  Date of evaluation: 2/24/2022  Time:  12:09 PM     Procedure Summary     Date: 02/24/22 Room / Location: [address] [place] [address] Bony Licona  / [facility]    Anesthesia Start: 1114 Anesthesia Stop: 1152    Procedure: COLONOSCOPY WITH BIOPSY (N/A ) Diagnosis:       Screening for colon cancer      (Screening for colon cancer [Z12.11])    Surgeons: Shawnee Miller MD Responsible Provider: Cipriano Carmen DO    Anesthesia Type: MAC ASA Status: 3          Anesthesia Type: MAC    Danny Phase I: Danny Score: 10    Danny Phase II: Danny Score: 8    Last vitals: Reviewed and per EMR flowsheets. Anesthesia Post Evaluation    Patient location during evaluation: PACU  Patient participation: complete - patient participated  Level of consciousness: awake  Pain score: 0  Airway patency: patent  Nausea & Vomiting: no nausea and no vomiting  Complications: no  Cardiovascular status: blood pressure returned to baseline  Respiratory status: acceptable  Hydration status: euvolemic  There was medical reason for not using a multimodal analgesia pain management approach.

## 2022-02-24 NOTE — TELEPHONE ENCOUNTER
Please let him know prostate fine.  I would probably wait until 2- for Ana Church M.D.  2/24/22   6:08 PM

## 2022-02-24 NOTE — PROGRESS NOTES
Ambulatory Surgery/Procedure Discharge Note    Pt tolerated procedure well. Denies any pain or nausea post procedure. Discharge instructions reviewed with pt and wife. Written instructions provided at discharge. Discharged in wheelchair to lobby level. Wife to drive home. Vitals:    02/24/22 1205   BP: 112/73   Pulse: 78   Resp: 18   Temp:    SpO2: 100%       In: 120 [P.O.:120]  Out: -     Restroom use offered before discharge. Yes    Pain assessment:  none  Pain Level: 0        Patient discharged to home/self care.  Patient discharged via wheel chair by transporter to waiting family/S.O.       2/24/2022 12:45 PM

## 2022-02-24 NOTE — OP NOTE
Operative Note      Patient: Aspen Jones Sr. YOB: 1955  MRN: [id number]    Date of Procedure: 2/24/2022   OPERATIVE NOTE    PREOPERATIVE DIAGNOSIS: Screening Colonoscopy - Colon Cancer Screening  POSTOPERATIVE DIAGNOSIS: Same  PROCEDURE: Colonoscopy  ANESTHESIA: MAC  SURGEONS: Ayala Osborn M.D.  ASSISTANTS: No qualified residents available to assist   PREP QUALITY: Good  ESTIMATED BLOOD LOSS: 0    OPERATIVE NOTE    After informed consent was obtained the patient was taken to the endoscopy suite. Time out was called to confirm key components. We began by performing a digital rectal exam: No lesions were noted on digital exam.     We then performed colonoscopy complete to the cecum. The exam was not difficult. The prep was fair to good with a small amount of solid fecal material in the distal transverse to sigmoid which was difficult to aspirate due to some vegetable matter. Cecum and appendiceal orifice were seen and photographed. Withdrawal time was over 6 minutes. There was a small segment of the sigmoid that appeared inflamed. There were no diverticuli in this area. We biopsied this area. No other pathology was seen. Resection and retrieval for all lesions was complete. Good hemostasis was seen. Retroflexion showed normal rectum and anus aside from small hemorrhoids. Dr. Leslee Nash was present and performed all portions. The patient tolerated well with no immediate complication.  Follow up is recommended based on pathology    Ayala Osborn M.D.  2/24/22   11:47 AM

## 2022-02-24 NOTE — TELEPHONE ENCOUNTER
Pt called having questions post procedure. Colonoscopy was done today. Pt is asking if he can take his full dose of xanax and if his prostate was checked before the procedure was done. Pt had requested that be checked as well. Please call with answers to his questions.

## 2022-02-28 ENCOUNTER — TELEPHONE (OUTPATIENT)
Dept: SURGERY | Age: 67
End: 2022-02-28

## 2022-02-28 NOTE — TELEPHONE ENCOUNTER
Patient called in to go over his colonoscopy and biopsy results    Please contact the patient at [phone]

## 2022-02-28 NOTE — TELEPHONE ENCOUNTER
Spoke with . Akil Rondon regarding colonoscopy results    Biopsies showed some mild inflammation. Discussed this was a very small area so I doubt inflammatory bowel disease or infection given the pattern. I also don't think the inflammation was diverticulitis given no tics in that area. Most likely drug effect    Given the above would recommend colonoscopy 5 years to ensure no further inflammation.     Polina Cuellar M.D.  2/28/22   3:03 PM

## 2022-03-10 ENCOUNTER — OFFICE VISIT (OUTPATIENT)
Dept: RHEUMATOLOGY | Age: 67
End: 2022-03-10
Payer: COMMERCIAL

## 2022-03-10 VITALS
WEIGHT: 192 LBS | HEIGHT: 71 IN | DIASTOLIC BLOOD PRESSURE: 68 MMHG | BODY MASS INDEX: 26.88 KG/M2 | SYSTOLIC BLOOD PRESSURE: 140 MMHG

## 2022-03-10 DIAGNOSIS — M1A.09X0 IDIOPATHIC CHRONIC GOUT OF MULTIPLE SITES WITHOUT TOPHUS: Primary | ICD-10-CM

## 2022-03-10 DIAGNOSIS — M15.9 GENERALIZED OSTEOARTHRITIS: ICD-10-CM

## 2022-03-10 DIAGNOSIS — Z51.81 ENCOUNTER FOR MEDICATION MONITORING: ICD-10-CM

## 2022-03-10 PROCEDURE — 1123F ACP DISCUSS/DSCN MKR DOCD: CPT | Performed by: INTERNAL MEDICINE

## 2022-03-10 PROCEDURE — 4040F PNEUMOC VAC/ADMIN/RCVD: CPT | Performed by: INTERNAL MEDICINE

## 2022-03-10 PROCEDURE — G8484 FLU IMMUNIZE NO ADMIN: HCPCS | Performed by: INTERNAL MEDICINE

## 2022-03-10 PROCEDURE — 3017F COLORECTAL CA SCREEN DOC REV: CPT | Performed by: INTERNAL MEDICINE

## 2022-03-10 PROCEDURE — 99214 OFFICE O/P EST MOD 30 MIN: CPT | Performed by: INTERNAL MEDICINE

## 2022-03-10 PROCEDURE — 1036F TOBACCO NON-USER: CPT | Performed by: INTERNAL MEDICINE

## 2022-03-10 PROCEDURE — G8417 CALC BMI ABV UP PARAM F/U: HCPCS | Performed by: INTERNAL MEDICINE

## 2022-03-10 PROCEDURE — G8427 DOCREV CUR MEDS BY ELIG CLIN: HCPCS | Performed by: INTERNAL MEDICINE

## 2022-03-10 NOTE — PROGRESS NOTES
[address] Delmar Luciano MD                                                                                                                [address]                                               [phone] (k) [phone] (F)      Primary provider: Deeann Gilford, MD  Patient identification: Coretta Shrape Sr. ,: 1955,Sex: male         ASSESSMENT/PLAN:  Tita Cuellar was seen today for follow-up. Diagnoses and all orders for this visit:    Idiopathic chronic gout of multiple sites without tophus  -     Comprehensive Metabolic Panel; Future  -     CBC with Auto Differential; Future  -     Uric Acid; Future    Generalized osteoarthritis    Encounter for medication monitoring      Polyarticular gout-asymptomatic on Uloric 40 mg a day. Continue current med. Check safety labs as above, prescription updated. Worsening pain in hips and knees- from osteoarthritis- Exercises, wt loss, and Tylenol arthritis prn. Avoid NSAID. Check safety labs today, prescriptions updated. Follow-up in 6 months. Patient indicates understanding and agrees with the management plan. I reviewed patients medical information and medical history in the medical records. Note is transcribed using voice recognition software. Inadvertent computerized transcription errors may be present. ##################################################################    Subjective: Follow-up for polyarticular gout and generalized osteoarthritis. He continues to have arthralgias in his knees and hips with walking and weightbearing from osteoarthritis. No gout flares. Otherwise doing well. Safety labs are normal. Rest of ROS negative. Current Outpatient Medications   Medication Sig Dispense Refill    ALPRAZolam (XANAX) 1 MG tablet Take 1 mg by mouth 3 times daily as needed for Sleep. MONOPCT 8.1 08/08/2019    BASOPCT 0.5 08/08/2019    MONOSABS 0.5 08/08/2019    LYMPHSABS 2.0 08/08/2019    EOSABS 0.4 08/08/2019    BASOSABS 0.0 08/08/2019       Chemistry        Component Value Date/Time     09/02/2021 1503    K 4.6 09/02/2021 1503     09/02/2021 1503    CO2 25 09/02/2021 1503    BUN 31 (H) 09/02/2021 1503    CREATININE 1.7 (H) 09/02/2021 1503        Component Value Date/Time    CALCIUM 9.6 09/02/2021 1503    ALKPHOS 74 09/02/2021 1503    AST 22 09/02/2021 1503    ALT 20 09/02/2021 1503    BILITOT 1.1 (H) 09/02/2021 1503          Lab Results   Component Value Date    SEDRATE 14 08/18/2016        I thank you for giving me the opportunity to be involved in [address] [place] [address] and I look forward following Denisse Sheth along with you. If you have any questions or concerns please feel free to contact me at any time. Total time spent 31 minutes-mainly listening to patient passively-like to express multiple concerns including it healthcare providers, health conditions and so forth. He is a very nice gentleman but office visit is awfully long because of lengthy one-way conversations.   Melissa Douglas MD 03/10/22

## 2022-03-17 ENCOUNTER — OFFICE VISIT (OUTPATIENT)
Dept: FAMILY MEDICINE CLINIC | Age: 67
End: 2022-03-17
Payer: COMMERCIAL

## 2022-03-17 ENCOUNTER — TELEPHONE (OUTPATIENT)
Dept: FAMILY MEDICINE CLINIC | Age: 67
End: 2022-03-17

## 2022-03-17 VITALS
OXYGEN SATURATION: 94 % | DIASTOLIC BLOOD PRESSURE: 74 MMHG | HEART RATE: 84 BPM | SYSTOLIC BLOOD PRESSURE: 128 MMHG | TEMPERATURE: 97.7 F | WEIGHT: 199 LBS | BODY MASS INDEX: 28.15 KG/M2

## 2022-03-17 DIAGNOSIS — Z79.4 TYPE 2 DIABETES MELLITUS WITH DIABETIC NEPHROPATHY, WITH LONG-TERM CURRENT USE OF INSULIN (HCC): Primary | ICD-10-CM

## 2022-03-17 DIAGNOSIS — E78.2 MIXED HYPERLIPIDEMIA: ICD-10-CM

## 2022-03-17 DIAGNOSIS — N40.0 BENIGN PROSTATIC HYPERPLASIA WITHOUT LOWER URINARY TRACT SYMPTOMS: ICD-10-CM

## 2022-03-17 DIAGNOSIS — N18.32 STAGE 3B CHRONIC KIDNEY DISEASE (HCC): ICD-10-CM

## 2022-03-17 DIAGNOSIS — K42.9 UMBILICAL HERNIA WITHOUT OBSTRUCTION AND WITHOUT GANGRENE: ICD-10-CM

## 2022-03-17 DIAGNOSIS — F41.9 ANXIETY: ICD-10-CM

## 2022-03-17 DIAGNOSIS — K21.9 GASTROESOPHAGEAL REFLUX DISEASE WITHOUT ESOPHAGITIS: ICD-10-CM

## 2022-03-17 DIAGNOSIS — F41.0 PANIC ATTACK: ICD-10-CM

## 2022-03-17 DIAGNOSIS — I10 ESSENTIAL HYPERTENSION: ICD-10-CM

## 2022-03-17 DIAGNOSIS — E11.21 TYPE 2 DIABETES MELLITUS WITH DIABETIC NEPHROPATHY, WITH LONG-TERM CURRENT USE OF INSULIN (HCC): Primary | ICD-10-CM

## 2022-03-17 LAB — HBA1C MFR BLD: 6.4 %

## 2022-03-17 PROCEDURE — G8484 FLU IMMUNIZE NO ADMIN: HCPCS | Performed by: NURSE PRACTITIONER

## 2022-03-17 PROCEDURE — 3017F COLORECTAL CA SCREEN DOC REV: CPT | Performed by: NURSE PRACTITIONER

## 2022-03-17 PROCEDURE — 3044F HG A1C LEVEL LT 7.0%: CPT | Performed by: NURSE PRACTITIONER

## 2022-03-17 PROCEDURE — G8417 CALC BMI ABV UP PARAM F/U: HCPCS | Performed by: NURSE PRACTITIONER

## 2022-03-17 PROCEDURE — 1036F TOBACCO NON-USER: CPT | Performed by: NURSE PRACTITIONER

## 2022-03-17 PROCEDURE — 1123F ACP DISCUSS/DSCN MKR DOCD: CPT | Performed by: NURSE PRACTITIONER

## 2022-03-17 PROCEDURE — G8427 DOCREV CUR MEDS BY ELIG CLIN: HCPCS | Performed by: NURSE PRACTITIONER

## 2022-03-17 PROCEDURE — 2022F DILAT RTA XM EVC RTNOPTHY: CPT | Performed by: NURSE PRACTITIONER

## 2022-03-17 PROCEDURE — 99214 OFFICE O/P EST MOD 30 MIN: CPT | Performed by: NURSE PRACTITIONER

## 2022-03-17 PROCEDURE — 4040F PNEUMOC VAC/ADMIN/RCVD: CPT | Performed by: NURSE PRACTITIONER

## 2022-03-17 PROCEDURE — 83036 HEMOGLOBIN GLYCOSYLATED A1C: CPT | Performed by: NURSE PRACTITIONER

## 2022-03-17 RX ORDER — ALPRAZOLAM 1 MG/1
1 TABLET ORAL 3 TIMES DAILY PRN
Qty: 30 TABLET | Refills: 2 | Status: SHIPPED | OUTPATIENT
Start: 2022-03-17 | End: 2022-03-17 | Stop reason: SDUPTHER

## 2022-03-17 RX ORDER — FAMOTIDINE 20 MG/1
TABLET, FILM COATED ORAL
Qty: 30 TABLET | Refills: 2 | Status: SHIPPED | OUTPATIENT
Start: 2022-03-17 | End: 2022-03-17

## 2022-03-17 RX ORDER — ALPRAZOLAM 1 MG/1
1 TABLET ORAL 3 TIMES DAILY PRN
Qty: 100 TABLET | Refills: 2 | Status: SHIPPED | OUTPATIENT
Start: 2022-03-17 | End: 2022-03-21 | Stop reason: SDUPTHER

## 2022-03-17 RX ORDER — ATORVASTATIN CALCIUM 20 MG/1
20 TABLET, FILM COATED ORAL DAILY
Qty: 30 TABLET | Refills: 3 | Status: SHIPPED | OUTPATIENT
Start: 2022-03-17 | End: 2022-06-16 | Stop reason: SDUPTHER

## 2022-03-17 RX ORDER — ALLOPURINOL 100 MG/1
100 TABLET ORAL DAILY
Qty: 30 TABLET | Refills: 11 | Status: SHIPPED | OUTPATIENT
Start: 2022-03-17 | End: 2022-03-17

## 2022-03-17 ASSESSMENT — ENCOUNTER SYMPTOMS
EYE DISCHARGE: 0
SHORTNESS OF BREATH: 0
CHEST TIGHTNESS: 0
EYE PAIN: 0
BACK PAIN: 0
VOMITING: 0
EYE REDNESS: 0
WHEEZING: 0
COUGH: 0
NAUSEA: 0
SINUS PRESSURE: 0
RHINORRHEA: 0
ABDOMINAL DISTENTION: 0
STRIDOR: 0
CONSTIPATION: 0
DIARRHEA: 0
TROUBLE SWALLOWING: 0
SINUS PAIN: 0
ABDOMINAL PAIN: 0
EYE ITCHING: 0

## 2022-03-17 NOTE — PROGRESS NOTES
Tiarra Keith Sr. (:  1955) is a 74 y.o. male,Established patient, here for evaluation of the following chief complaint(s):  Diabetes      ASSESSMENT/PLAN:  1. Type 2 diabetes mellitus with diabetic nephropathy, with long-term current use of insulin ([facility]  Assessment & Plan:  Labs today  Orders:  -     POCT glycosylated hemoglobin (Hb A1C)  -     CBC with Auto Differential; Future  -     Comprehensive Metabolic Panel; Future  -     Hemoglobin A1C; Future  2. Gastroesophageal reflux disease without esophagitis  3. Mixed hyperlipidemia  -     atorvastatin (LIPITOR) 20 MG tablet; Take 1 tablet by mouth daily, Disp-30 tablet, R-3Normal  -     LIPID PANEL; Future  4. Panic attack  Assessment & Plan:  Stable may continue with Xanax 3 times daily and as needed  Orders:  -     ALPRAZolam (XANAX) 1 MG tablet; Take 1 tablet by mouth 3 times daily as needed (panic disorder) for up to 30 days. , Disp-100 tablet, R-2Normal  5. Benign prostatic hyperplasia without lower urinary tract symptoms  Assessment & Plan:  No results found for: PSA, PSADIA  We will check PSA today  Orders:  -     PSA, Prostatic Specific Antigen; Future  6. Anxiety  Assessment & Plan:  Patient denies this is a diagnosis. Prefers to say panic  7. Stage 3b chronic kidney disease ([facility]  Assessment & Plan:  Labs today  8. Essential hypertension  Assessment & Plan:  Stable  No medications  9. Umbilical hernia without obstruction and without gangrene  Assessment & Plan:   Asymptomatic, continue current medications and . No follow-ups on file. SUBJECTIVE/OBJECTIVE:  Estrada Covington presents today for follow-up on his type 2 diabetes, hyperlipidemia, BPH, panic disorder. Estrada Covington is very specific that he does not have anxiety however he has a panic disorder and his prescriptions must say that. He takes Xanax 1 mg 3 times a day with an occasional extra dose as needed. He has been doing this for a number of years.   He completed his colonoscopy and is to follow with a GI  He does not check his blood sugars      Current Outpatient Medications   Medication Sig Dispense Refill    atorvastatin (LIPITOR) 20 MG tablet Take 1 tablet by mouth daily 30 tablet 3    ALPRAZolam (XANAX) 1 MG tablet Take 1 tablet by mouth 3 times daily as needed (panic disorder) for up to 30 days. 100 tablet 2    vitamin D3 (CHOLECALCIFEROL) 10 MCG (400 UNIT) TABS tablet Take 400 Units by mouth daily      Ascorbic Acid (VITAMIN C) 250 MG tablet Take 250 mg by mouth daily      Biotin 1000 MCG TABS Take 1,000 mcg by mouth daily      triamcinolone (KENALOG) 0.1 % ointment Apply topically 2 times daily 80 g 0    blood glucose test strips (TRUE METRIX BLOOD GLUCOSE TEST) strip USE TO TEST 3-4 TIMES DAILY AS NEEDED 400 strip 5    TRUEplus Lancets 33G MISC USE three to  FOUR TIMES A  each 5    aspirin 325 MG EC tablet Take 325 mg by mouth daily       No current facility-administered medications for this visit. Review of Systems   Constitutional: Negative for chills, fatigue and fever. HENT: Negative for congestion, ear pain, hearing loss, rhinorrhea, sinus pressure, sinus pain, tinnitus and trouble swallowing. Eyes: Negative for pain, discharge, redness and itching. Respiratory: Negative for cough, chest tightness, shortness of breath, wheezing and stridor. Cardiovascular: Negative for chest pain, palpitations and leg swelling. Gastrointestinal: Negative for abdominal distention, abdominal pain, constipation, diarrhea, nausea and vomiting. Genitourinary: Negative for difficulty urinating, dysuria, hematuria and urgency. Musculoskeletal: Negative for back pain, joint swelling, myalgias and neck pain. Skin: Negative for rash and wound. Neurological: Negative for dizziness and headaches. Psychiatric/Behavioral: The patient is nervous/anxious and is hyperactive.         Vitals:    03/17/22 1354   BP: 128/74   Site: Left Upper Arm   Position: Sitting   Cuff Size: Medium Adult   Pulse: 84   Temp: 97.7 °F (36.5 °C)   SpO2: 94%   Weight: 199 lb (90.3 kg)       Physical Exam  Constitutional:       Appearance: He is well-developed. HENT:      Head: Normocephalic. Eyes:      Pupils: Pupils are equal, round, and reactive to light. Cardiovascular:      Rate and Rhythm: Normal rate and regular rhythm. Heart sounds: Normal heart sounds. Pulmonary:      Effort: Pulmonary effort is normal.      Breath sounds: Normal breath sounds. Musculoskeletal:         General: Normal range of motion. Cervical back: Normal range of motion. Skin:     General: Skin is warm and dry. Neurological:      Mental Status: He is alert and oriented to person, place, and time. Psychiatric:      Comments: At his baseline                 An electronic signature was used to authenticate this note.     --SHAI Wilson - CNP

## 2022-03-17 NOTE — TELEPHONE ENCOUNTER
Please see below:      ALPRAZolam [facility]) 1 MG tablet [[id number]]     Order Details  Dose: 1 mg Route: Oral Frequency: 3 TIMES DAILY PRN for panic disorder   Dispense Quantity: 100 tablet Refills: 2          Sig: Take 1 tablet by mouth 3 times daily as needed (panic disorder) for up to 30 days.           Pt states that the directions need to be wrote as: and as needed, instead of: as needed. Pt would like this resubmitted to the pharmacy.     Pharmacy    [place] [id number]   Phone:  [phone]  Fax:  [phone]

## 2022-03-21 DIAGNOSIS — F41.0 PANIC ATTACK: ICD-10-CM

## 2022-03-21 RX ORDER — ALPRAZOLAM 1 MG/1
1 TABLET ORAL 3 TIMES DAILY PRN
Qty: 100 TABLET | Refills: 2 | Status: SHIPPED | OUTPATIENT
Start: 2022-03-21 | End: 2022-06-16 | Stop reason: SDUPTHER

## 2022-03-21 RX ORDER — ALPRAZOLAM 1 MG/1
1 TABLET ORAL 3 TIMES DAILY PRN
Qty: 100 TABLET | Refills: 2 | Status: CANCELLED | OUTPATIENT
Start: 2022-03-21 | End: 2022-04-20

## 2022-03-21 NOTE — TELEPHONE ENCOUNTER
Pt sig should say: Take 1 tablet by mouth 3 times daily and as needed (panic disorder) for up to 30 days.     LOV 03/17/2022

## 2022-04-07 ENCOUNTER — OFFICE VISIT (OUTPATIENT)
Dept: ENDOCRINOLOGY | Age: 67
End: 2022-04-07
Payer: COMMERCIAL

## 2022-04-07 VITALS
OXYGEN SATURATION: 98 % | HEIGHT: 71 IN | HEART RATE: 74 BPM | BODY MASS INDEX: 28 KG/M2 | SYSTOLIC BLOOD PRESSURE: 138 MMHG | DIASTOLIC BLOOD PRESSURE: 76 MMHG | WEIGHT: 200 LBS

## 2022-04-07 DIAGNOSIS — E11.9 CONTROLLED TYPE 2 DIABETES MELLITUS WITHOUT COMPLICATION, WITHOUT LONG-TERM CURRENT USE OF INSULIN (HCC): Primary | ICD-10-CM

## 2022-04-07 DIAGNOSIS — E78.49 OTHER HYPERLIPIDEMIA: ICD-10-CM

## 2022-04-07 PROCEDURE — 2022F DILAT RTA XM EVC RTNOPTHY: CPT | Performed by: INTERNAL MEDICINE

## 2022-04-07 PROCEDURE — 3017F COLORECTAL CA SCREEN DOC REV: CPT | Performed by: INTERNAL MEDICINE

## 2022-04-07 PROCEDURE — 1036F TOBACCO NON-USER: CPT | Performed by: INTERNAL MEDICINE

## 2022-04-07 PROCEDURE — 1123F ACP DISCUSS/DSCN MKR DOCD: CPT | Performed by: INTERNAL MEDICINE

## 2022-04-07 PROCEDURE — 99213 OFFICE O/P EST LOW 20 MIN: CPT | Performed by: INTERNAL MEDICINE

## 2022-04-07 PROCEDURE — G8427 DOCREV CUR MEDS BY ELIG CLIN: HCPCS | Performed by: INTERNAL MEDICINE

## 2022-04-07 PROCEDURE — 3044F HG A1C LEVEL LT 7.0%: CPT | Performed by: INTERNAL MEDICINE

## 2022-04-07 PROCEDURE — G8417 CALC BMI ABV UP PARAM F/U: HCPCS | Performed by: INTERNAL MEDICINE

## 2022-04-07 PROCEDURE — 4040F PNEUMOC VAC/ADMIN/RCVD: CPT | Performed by: INTERNAL MEDICINE

## 2022-04-07 RX ORDER — GLUCOSAM/CHON-MSM1/C/MANG/BOSW 500-416.6
TABLET ORAL
Qty: 400 EACH | Refills: 5 | Status: SHIPPED | OUTPATIENT
Start: 2022-04-07 | End: 2022-10-13 | Stop reason: SDUPTHER

## 2022-04-07 RX ORDER — CALCIUM CITRATE/VITAMIN D3 200MG-6.25
TABLET ORAL
Qty: 400 STRIP | Refills: 5 | Status: SHIPPED | OUTPATIENT
Start: 2022-04-07 | End: 2022-10-13 | Stop reason: SDUPTHER

## 2022-04-07 NOTE — PROGRESS NOTES
Seen as f/u patient for diabetes    Interim:  Glucose stable  No issues    Reports issues with Lipitor, muscle aches    Diagnosed with Type 2 diabetes mellitus 5 years  Known diabetic complications: Nephropathy( cause?)     Current diabetic medications   onglyza 5mg-  Off of it as BG improved  GLipizide - stopped as had hypoglycemia- BG 38    Mild, controlled    Off metformin, has CKD    Last A1c 6.4%<--- 6.8%<-----<-----6.6%<------6.4%<------6.9%<---- 6.8%<-----6.4% <------6.6%<----6.5%<-----5.9 on 3/17<------6.1 on 12/16<------- 6.2% on 8/16 <------- 6.8 on 5/16 <--- 10.1<----- 7.5 on 8/15<--- 9.4 <---- 7.9    Prior visit with dietician: No  Current diet: on average, 3 meals per day  Started to restrict CHO  Current exercise: Walking  Current monitoring regimen: home blood tests 3-4  times daily     Has brought blood glucose log/meter:no  Home blood sugar records: ----  Any episodes of hypoglycemia? no    No Hx of CAD , PVD, CVA    Hyperlipidemia:stable, tolerating Zocor 20mg   on 1/13    LDL 72 on 9/17   on 12/18  LDL 57 on 12/19  lipitor 40mg  LDL 55 on 9/20   on 1/21   Now on lipitor 20mg    H/o crestor use-reports side effects    Repatha was denied per patient    Last eye exam: 9/20  Last foot exam: 12/19  Last microalbumin to creatinine ratio: 9/17   Cr 1.9 on 3/16---> 1.5 on 8/16---> 1.4 on 10/16--> 1.2 on 6/18--> 1.3---> 1.7    HTN: Stable, lisinopril 10mg- off it due to low BP    He has arthritis, he was inquiring about steroids and effect on DM    Past Medical History:   Diagnosis Date    Anxiety     HTN (hypertension)     Hyperlipemia     Panic disorder     Type II or unspecified type diabetes mellitus without mention of complication, not stated as uncontrolled     Umbilical hernia      Past Surgical History:   Procedure Laterality Date    CHOLECYSTECTOMY, LAPAROSCOPIC  09/16/2016    with gram    COLONOSCOPY N/A 2/24/2022    COLONOSCOPY WITH [facility]OPSY performed by Jewels Lopez, [facility] at [facility] [address] SINUS SURGERY      TONSILLECTOMY      UMBILICAL HERNIA REPAIR  09/16/2016     Current Outpatient Medications   Medication Sig Dispense Refill    ALPRAZolam (XANAX) 1 MG tablet Take 1 tablet by mouth 3 times daily as needed (panic disorder) for up to 30 days. TID and as needed. Max of 4 per day. Dispense 100 tablets only 100 tablet 2    atorvastatin (LIPITOR) 20 MG tablet Take 1 tablet by mouth daily 30 tablet 3    vitamin D3 (CHOLECALCIFEROL) 10 MCG (400 UNIT) TABS tablet Take 400 Units by mouth daily      Ascorbic Acid (VITAMIN C) 250 MG tablet Take 250 mg by mouth daily      Biotin 1000 MCG TABS Take 1,000 mcg by mouth daily      triamcinolone (KENALOG) 0.1 % ointment Apply topically 2 times daily 80 g 0    blood glucose test strips (TRUE METRIX BLOOD GLUCOSE TEST) strip USE TO TEST 3-4 TIMES DAILY AS NEEDED 400 strip 5    TRUEplus Lancets 33G MISC USE three to  FOUR TIMES A  each 5    aspirin 325 MG EC tablet Take 325 mg by mouth daily       No current facility-administered medications for this visit. Review of Systems  Scanned, reviewed      Objective:     Vitals:    04/07/22 1559   BP: 138/76   Pulse: 74   SpO2: 98%          /76   Pulse 74   Ht 5' 10.5\" (1.791 m)   Wt 200 lb (90.7 kg)   SpO2 98%   BMI 28.29 kg/m²   Wt Readings from Last 3 Encounters:   04/07/22 200 lb (90.7 kg)   03/17/22 199 lb (90.3 kg)   03/10/22 192 lb (87.1 kg)     Constitutional: Well-developed, appears stated age, cooperative, in no acute distress  H/E/N/M/T:atraumatic, normocephalic, external ears, nose, lips normal without lesions  Eyes: Lids, lashes, conjunctivae and sclerae normal, No proptosis, no redness  Neck: supple, symmetrical, no swelling  Skin: No obvious rashes or lesions present.   Skin and hair texture normal  Psychiatric: Judgement and Insight:  judgement and insight appear normal  Neuro: Normal without focal findings, speech is normal normal, speech is spontaneous  Chest: No labored breathing, no chest deformity, no stridor  Musculoskeletal: No joint deformity, swelling      8/18  Skeletal foot exam is normal, no skin lesions, toenails are normal, 10 g monofilament is detected , DP palpable, Callus+      Lab Reviewed   No components found for: CHLPL  Lab Results   Component Value Date    TRIG 72 11/03/2021    TRIG 46 03/23/2021    TRIG 60 09/08/2020     Lab Results   Component Value Date    HDL 44 11/03/2021    HDL 47 03/23/2021    HDL 44 09/08/2020     Lab Results   Component Value Date    LDLCALC 119 (H) 11/03/2021    LDLCALC 68 03/23/2021    LDLCALC 55 09/08/2020     Lab Results   Component Value Date    LABVLDL 14 11/03/2021    LABVLDL 9 03/23/2021    LABVLDL 12 09/08/2020     Lab Results   Component Value Date    LABA1C 6.4 03/17/2022       Assessment:     Jimmy West Sr. is a 74 y.o. male with :    1.T2DM: Longstanding, controlled, has CKD, need to avoid sensitizer and SGLT-2 inhibitor. Had low with Glipizide,Off DPP IV given lows, has lost weight, dietary changes, Blood glucose improved, A1c at goal, re enforced changes made by patient. He has not been exercising much, advised walking /life style change. 2.HTN: BP near goal , off medication  3. HLD: LDL at goal but statin intolerance, muscle aches. PCSK-9 inhibitor not covered per patient. He will be seeing cardiology  Takes Vit D 1000 IU , vit D 38, switched to D2, did not help  Can go back to D3  He is back on lipitor 20mg  4. CKDIII: saw Nephrology but not following now  5. Gout  6. Panic Disorder  7. ED: Libido is good, nl testosterone, as he would like evaluated but concerned about side effects, discussed it may not help ED much, advised see urology      Plan:      Hold metformin and onglyza   Advise to check blood sugar 1 times a day but he would like to monitor more frequently as helped controlled- recommend TID                                                               Patient to send blood sugar log for titration. Advise to exercise regularly. Advise to low simple carbohydrate and protein with each  meal diet. 40gm of CHO meal per day   Diabetes Care: recommend yearly eye exam, foot exam and urine microalbumin to   creatinine ratio.      -Hyperlipidemia, LDL goal is <100 mg/dl   -Hypertension: Continue same  -Daily ASA: 81mg  -Smoking status: non smoker

## 2022-05-13 RX ORDER — FEBUXOSTAT 40 MG/1
TABLET ORAL
Qty: 30 TABLET | Refills: 11 | Status: SHIPPED | OUTPATIENT
Start: 2022-05-13 | End: 2022-09-15 | Stop reason: SDUPTHER

## 2022-06-16 ENCOUNTER — OFFICE VISIT (OUTPATIENT)
Dept: FAMILY MEDICINE CLINIC | Age: 67
End: 2022-06-16
Payer: COMMERCIAL

## 2022-06-16 VITALS
HEART RATE: 80 BPM | DIASTOLIC BLOOD PRESSURE: 68 MMHG | BODY MASS INDEX: 28.34 KG/M2 | SYSTOLIC BLOOD PRESSURE: 136 MMHG | OXYGEN SATURATION: 97 % | TEMPERATURE: 98.2 F | WEIGHT: 202.4 LBS | HEIGHT: 71 IN

## 2022-06-16 DIAGNOSIS — E11.21 TYPE 2 DIABETES MELLITUS WITH DIABETIC NEPHROPATHY, UNSPECIFIED WHETHER LONG TERM INSULIN USE (HCC): ICD-10-CM

## 2022-06-16 DIAGNOSIS — I10 ESSENTIAL HYPERTENSION: Primary | ICD-10-CM

## 2022-06-16 DIAGNOSIS — E78.2 MIXED HYPERLIPIDEMIA: ICD-10-CM

## 2022-06-16 DIAGNOSIS — N18.32 STAGE 3B CHRONIC KIDNEY DISEASE (HCC): ICD-10-CM

## 2022-06-16 DIAGNOSIS — F41.0 PANIC ATTACK: ICD-10-CM

## 2022-06-16 PROCEDURE — 2022F DILAT RTA XM EVC RTNOPTHY: CPT | Performed by: FAMILY MEDICINE

## 2022-06-16 PROCEDURE — 1123F ACP DISCUSS/DSCN MKR DOCD: CPT | Performed by: FAMILY MEDICINE

## 2022-06-16 PROCEDURE — 3017F COLORECTAL CA SCREEN DOC REV: CPT | Performed by: FAMILY MEDICINE

## 2022-06-16 PROCEDURE — 99214 OFFICE O/P EST MOD 30 MIN: CPT | Performed by: FAMILY MEDICINE

## 2022-06-16 PROCEDURE — 3044F HG A1C LEVEL LT 7.0%: CPT | Performed by: FAMILY MEDICINE

## 2022-06-16 PROCEDURE — 1036F TOBACCO NON-USER: CPT | Performed by: FAMILY MEDICINE

## 2022-06-16 PROCEDURE — G8417 CALC BMI ABV UP PARAM F/U: HCPCS | Performed by: FAMILY MEDICINE

## 2022-06-16 PROCEDURE — G8427 DOCREV CUR MEDS BY ELIG CLIN: HCPCS | Performed by: FAMILY MEDICINE

## 2022-06-16 RX ORDER — ALPRAZOLAM 1 MG/1
1 TABLET ORAL 3 TIMES DAILY PRN
COMMUNITY
End: 2022-09-20 | Stop reason: SDUPTHER

## 2022-06-16 RX ORDER — ALPRAZOLAM 1 MG/1
1 TABLET ORAL 3 TIMES DAILY PRN
Qty: 100 TABLET | Refills: 2 | Status: SHIPPED | OUTPATIENT
Start: 2022-06-16 | End: 2022-07-16

## 2022-06-16 RX ORDER — ATORVASTATIN CALCIUM 20 MG/1
20 TABLET, FILM COATED ORAL DAILY
Qty: 90 TABLET | Refills: 3 | Status: SHIPPED | OUTPATIENT
Start: 2022-06-16 | End: 2022-09-20 | Stop reason: SDUPTHER

## 2022-06-16 ASSESSMENT — PATIENT HEALTH QUESTIONNAIRE - PHQ9
2. FEELING DOWN, DEPRESSED OR HOPELESS: 0
SUM OF ALL RESPONSES TO PHQ QUESTIONS 1-9: 0
SUM OF ALL RESPONSES TO PHQ QUESTIONS 1-9: 0
SUM OF ALL RESPONSES TO PHQ9 QUESTIONS 1 & 2: 0
1. LITTLE INTEREST OR PLEASURE IN DOING THINGS: 0
SUM OF ALL RESPONSES TO PHQ QUESTIONS 1-9: 0
SUM OF ALL RESPONSES TO PHQ QUESTIONS 1-9: 0

## 2022-06-16 ASSESSMENT — ENCOUNTER SYMPTOMS
CHEST TIGHTNESS: 0
EYE ITCHING: 0
EYE REDNESS: 0
BACK PAIN: 0
ABDOMINAL DISTENTION: 0
SINUS PRESSURE: 0
BLOOD IN STOOL: 0
FACIAL SWELLING: 0
ABDOMINAL PAIN: 0
ANAL BLEEDING: 0
COUGH: 0
STRIDOR: 0
SHORTNESS OF BREATH: 0
RHINORRHEA: 0
PHOTOPHOBIA: 0
CHOKING: 0
EYE DISCHARGE: 0
EYE PAIN: 0
COLOR CHANGE: 0
WHEEZING: 0
APNEA: 0

## 2022-06-16 NOTE — ASSESSMENT & PLAN NOTE
Well-controlled, continue current medications--please take the least you need  Controlled Substance Monitoring:    Acute and Chronic Pain Monitoring:   RX Monitoring 6/16/2022   Attestation -   Periodic Controlled Substance Monitoring Possible medication side effects, risk of tolerance/dependence & alternative treatments discussed. ;No signs of potential drug abuse or diversion identified. ;Assessed functional status.    Chronic Pain > 80 MEDD -

## 2022-06-16 NOTE — PROGRESS NOTES
Subjective:     Patient ID:Boone Crow is a 69 y.o. male. HPI  Treatment Adherence:   Medication compliance:  compliant most of the time  Diet compliance:  compliant most of the time  Weight trend: increasing  Current exercise: no regular exercise  Barriers: none    Diabetes Mellitus Type 2: Current symptoms/problems include none. Home blood sugar records: patient does not test  Any episodes of hypoglycemia? no  Daily Aspirin? Yes    Hypertension:  Home blood pressure monitoring: No.  He is adherent to a low sodium diet. Patient denies chest pain, shortness of breath, headache, lightheadedness, blurred vision, peripheral edema, palpitations, dry cough and fatigue. Antihypertensive medication side effects: no medication side effects noted. Use of agents associated with hypertension: none. Hyperlipidemia:  No new myalgias or GI upset on atorvastatin (Lipitor). Allergies   Allergen Reactions    Cephalexin     Crestor [Rosuvastatin] Other (See Comments)     myalgias    Levofloxacin     Penicillins     Succinylcholine      Does not want. Pt son had reaction to Succs and almost , pt was told not to ever take Succs.  Sulfa Antibiotics     Tetracyclines & Related     Verapamil     Vicodin [Hydrocodone-Acetaminophen]      Rash; ithching       Current Outpatient Medications   Medication Sig Dispense Refill    ALPRAZolam (XANAX) 1 MG tablet Take 1 mg by mouth 3 times daily as needed for Sleep.  ALPRAZolam (XANAX) 1 MG tablet Take 1 tablet by mouth 3 times daily as needed (panic disorder) for up to 30 days. TID and as needed. Max of 4 per day.  Dispense 100 tablets only 100 tablet 2    atorvastatin (LIPITOR) 20 MG tablet Take 1 tablet by mouth daily 90 tablet 3    ULORIC 40 MG TABS tablet TAKE ONE TABLET BY MOUTH DAILY 30 tablet 11    blood glucose test strips (TRUE METRIX BLOOD GLUCOSE TEST) strip USE TO TEST 3-4 TIMES DAILY AS NEEDED 400 strip 5    TRUEplus Lancets 33G MISC USE three to  FOUR TIMES A  each 5    vitamin D3 (CHOLECALCIFEROL) 10 MCG (400 UNIT) TABS tablet Take 400 Units by mouth daily      Ascorbic Acid (VITAMIN C) 250 MG tablet Take 250 mg by mouth daily      Biotin 1000 MCG TABS Take 1,000 mcg by mouth daily      aspirin 325 MG EC tablet Take 325 mg by mouth daily      triamcinolone (KENALOG) 0.1 % ointment Apply topically 2 times daily 80 g 0     No current facility-administered medications for this visit. Past Medical History:   Diagnosis Date    Anxiety     HTN (hypertension)     Hyperlipemia     Panic disorder     Type II or unspecified type diabetes mellitus without mention of complication, not stated as uncontrolled     Umbilical hernia        Past Surgical History:   Procedure Laterality Date    CHOLECYSTECTOMY, LAPAROSCOPIC  2016    with gram    COLONOSCOPY N/A 2022    COLONOSCOPY WITH BIOPSY performed by Leonor B[facility]illa [facility] at [facility] TONSILLECTOMY      UMBILICAL HERNIA REPAIR  2016       Social History     Socioeconomic History    Marital status:      Spouse name: Not on file    Number of children: Not on file    Years of education: Not on file    Highest education level: Not on file   Occupational History    Occupation: disabled   Tobacco Use    Smoking status: Former Smoker     Packs/day: 1.00     Years: 15.00     Pack years: 15.00     Types: Cigarettes     Quit date: 1995     Years since quittin.1    Smokeless tobacco: Never Used   Vaping Use    Vaping Use: Never used   Substance and Sexual Activity    Alcohol use:  Yes     Alcohol/week: 0.0 standard drinks     Comment: social    Drug use: No     Comment: denied    Sexual activity: Yes     Partners: Female   Other Topics Concern    Not on file   Social History Narrative    Live in girlfriend-3 kids (2 in town)     Social Determinants of Health     Financial Resource Strain: Medium Risk  Difficulty of Paying Living Expenses: Somewhat hard   Food Insecurity: No Food Insecurity    Worried About Running Out of Food in the Last Year: Never true    Ran Out of Food in the Last Year: Never true   Transportation Needs:     Lack of Transportation (Medical): Not on file    Lack of Transportation (Non-Medical):  Not on file   Physical Activity:     Days of Exercise per Week: Not on file    Minutes of Exercise per Session: Not on file   Stress:     Feeling of Stress : Not on file   Social Connections:     Frequency of Communication with Friends and Family: Not on file    Frequency of Social Gatherings with Friends and Family: Not on file    Attends Synagogue Services: Not on file    Active Member of [address] [place] [address] [employer] or Organizations: Not on file    Attends Club or Organization Meetings: Not on file    Marital Status: Not on file   Intimate Partner Violence:     Fear of Current or Ex-Partner: Not on file    Emotionally Abused: Not on file    Physically Abused: Not on file    Sexually Abused: Not on file   Housing Stability:     Unable to Pay for Housing in the Last Year: Not on file    Number of [place] in the Last Year: Not on file    Unstable Housing in the Last Year: Not on file       Family History   Problem Relation Age of Onset    Dementia Father     Dementia Sister        Immunization History   Administered Date(s) Administered    COVID-19, Pfizer Purple top, DILUTE for use, 12+ yrs, 30mcg/0.3mL dose 04/02/2021, 04/23/2021, 11/10/2021    Influenza 10/10/2012    Influenza, Intradermal, Preservative free 12/15/2014    Influenza, Quadv, IM, (6 mo and older Fluzone, Flulaval, Fluarix and 3 yrs and older Afluria) 10/13/2016    Influenza, Quadv, adjuvanted, 65 yrs +, IM, PF (Fluad) 12/17/2020, 09/29/2021    Influenza, Triv, inactivated, subunit, adjuvanted, IM (Fluad 65 yrs and older) 12/12/2019    Pneumococcal Polysaccharide ([id number]) 12/12/2019       Review of Systems  Review of Systems   Constitutional: Negative for activity change, appetite change, chills, diaphoresis, fatigue, fever and unexpected weight change. HENT: Negative for congestion, dental problem, drooling, ear discharge, ear pain, facial swelling, hearing loss, nosebleeds, postnasal drip, rhinorrhea, sinus pressure, sneezing and tinnitus. Eyes: Negative for photophobia, pain, discharge, redness, itching and visual disturbance. Respiratory: Negative for apnea, cough, choking, chest tightness, shortness of breath, wheezing and stridor. Cardiovascular: Negative for chest pain, palpitations and leg swelling. Gastrointestinal: Negative for abdominal distention, abdominal pain, anal bleeding and blood in stool. Genitourinary: Negative for decreased urine volume, difficulty urinating, dysuria, enuresis, flank pain, frequency, genital sores, hematuria, penile discharge, penile pain, penile swelling, scrotal swelling, testicular pain and urgency. Musculoskeletal: Negative for arthralgias, back pain, gait problem, joint swelling, myalgias, neck pain and neck stiffness. Skin: Negative for color change, pallor and rash. Neurological: Negative for dizziness, tremors, seizures, syncope, facial asymmetry, speech difficulty, weakness, light-headedness, numbness and headaches. Hematological: Negative for adenopathy. Does not bruise/bleed easily. Psychiatric/Behavioral: Negative for agitation, behavioral problems, confusion, decreased concentration, dysphoric mood, hallucinations, self-injury and sleep disturbance. The patient is not nervous/anxious and is not hyperactive. Objective:   Physical Exam  Physical Exam  Vitals reviewed. Constitutional:       General: He is not in acute distress. Appearance: He is well-developed. Eyes:      Conjunctiva/sclera: Conjunctivae normal.      Pupils: Pupils are equal, round, and reactive to light. Neck:      Thyroid: No thyromegaly. Vascular: No JVD. Trachea: No tracheal deviation. Cardiovascular:      Rate and Rhythm: Normal rate and regular rhythm. Heart sounds: Normal heart sounds. No murmur heard. No gallop. Pulmonary:      Effort: Pulmonary effort is normal. No respiratory distress. Breath sounds: Normal breath sounds. No stridor. No wheezing or rales. Chest:      Chest wall: No tenderness. Abdominal:      General: Bowel sounds are normal. There is no distension. Palpations: Abdomen is soft. There is no mass. Tenderness: There is no abdominal tenderness. Musculoskeletal:         General: No tenderness. Lymphadenopathy:      Cervical: No cervical adenopathy. Skin:     General: Skin is warm and dry. Coloration: Skin is not pale. Findings: No erythema or rash. Neurological:      Mental Status: He is alert and oriented to person, place, and time. Cranial Nerves: No cranial nerve deficit. Motor: No abnormal muscle tone. Coordination: Coordination normal.      Deep Tendon Reflexes: Reflexes normal.   Psychiatric:         Behavior: Behavior normal.         Thought Content: Thought content normal.         Judgment: Judgment normal.         Assessment and Plan:     Panic attack   Well-controlled, continue current medications--please take the least you need  Controlled Substance Monitoring:    Acute and Chronic Pain Monitoring:   RX Monitoring 6/16/2022   Attestation -   Periodic Controlled Substance Monitoring Possible medication side effects, risk of tolerance/dependence & alternative treatments discussed. ;No signs of potential drug abuse or diversion identified. ;Assessed functional status.    Chronic Pain > 80 MEDD -           Essential hypertension   Well-controlled, continue current medications    Type 2 diabetes mellitus with diabetic nephropathy (HCC)   Unclear control, changes made today: labs soon--sees endo    Mixed hyperlipidemia   Unclear control, changes made today: needs labs    CKD (chronic kidney disease) stage 3, GFR 30-59 ml/min   Unclear control, changes made today: labs

## 2022-08-12 ENCOUNTER — TELEPHONE (OUTPATIENT)
Dept: FAMILY MEDICINE CLINIC | Age: 67
End: 2022-08-12

## 2022-08-12 DIAGNOSIS — E11.21 TYPE 2 DIABETES MELLITUS WITH DIABETIC NEPHROPATHY, UNSPECIFIED WHETHER LONG TERM INSULIN USE (HCC): ICD-10-CM

## 2022-08-12 DIAGNOSIS — J06.9 VIRAL URI: ICD-10-CM

## 2022-08-12 DIAGNOSIS — J06.9 VIRAL URI: Primary | ICD-10-CM

## 2022-08-12 DIAGNOSIS — I10 ESSENTIAL HYPERTENSION: ICD-10-CM

## 2022-08-12 DIAGNOSIS — E78.2 MIXED HYPERLIPIDEMIA: ICD-10-CM

## 2022-08-12 LAB
A/G RATIO: 1.3 (ref 1.1–2.2)
ALBUMIN SERPL-MCNC: 3.9 G/DL (ref 3.4–5)
ALP BLD-CCNC: 63 U/L (ref 40–129)
ALT SERPL-CCNC: 19 U/L (ref 10–40)
ANION GAP SERPL CALCULATED.3IONS-SCNC: 14 MMOL/L (ref 3–16)
AST SERPL-CCNC: 28 U/L (ref 15–37)
BASOPHILS ABSOLUTE: 0 K/UL (ref 0–0.2)
BASOPHILS RELATIVE PERCENT: 0.1 %
BILIRUB SERPL-MCNC: 1 MG/DL (ref 0–1)
BUN BLDV-MCNC: 35 MG/DL (ref 7–20)
CALCIUM SERPL-MCNC: 9 MG/DL (ref 8.3–10.6)
CHLORIDE BLD-SCNC: 101 MMOL/L (ref 99–110)
CHOLESTEROL, TOTAL: 103 MG/DL (ref 0–199)
CO2: 22 MMOL/L (ref 21–32)
CREAT SERPL-MCNC: 1.8 MG/DL (ref 0.8–1.3)
CREATININE URINE: 77.7 MG/DL (ref 39–259)
EOSINOPHILS ABSOLUTE: 0.2 K/UL (ref 0–0.6)
EOSINOPHILS RELATIVE PERCENT: 3.9 %
GFR AFRICAN AMERICAN: 45
GFR NON-AFRICAN AMERICAN: 38
GLUCOSE BLD-MCNC: 123 MG/DL (ref 70–99)
HCT VFR BLD CALC: 37.8 % (ref 40.5–52.5)
HDLC SERPL-MCNC: 46 MG/DL (ref 40–60)
HEMOGLOBIN: 12.9 G/DL (ref 13.5–17.5)
LDL CHOLESTEROL CALCULATED: 47 MG/DL
LYMPHOCYTES ABSOLUTE: 1.3 K/UL (ref 1–5.1)
LYMPHOCYTES RELATIVE PERCENT: 20.7 %
MCH RBC QN AUTO: 31.5 PG (ref 26–34)
MCHC RBC AUTO-ENTMCNC: 34.1 G/DL (ref 31–36)
MCV RBC AUTO: 92.4 FL (ref 80–100)
MICROALBUMIN UR-MCNC: 1.9 MG/DL
MICROALBUMIN/CREAT UR-RTO: 24.5 MG/G (ref 0–30)
MONOCYTES ABSOLUTE: 0.8 K/UL (ref 0–1.3)
MONOCYTES RELATIVE PERCENT: 13.2 %
NEUTROPHILS ABSOLUTE: 3.9 K/UL (ref 1.7–7.7)
NEUTROPHILS RELATIVE PERCENT: 62.1 %
PDW BLD-RTO: 12.7 % (ref 12.4–15.4)
PLATELET # BLD: 133 K/UL (ref 135–450)
PMV BLD AUTO: 8.9 FL (ref 5–10.5)
POTASSIUM SERPL-SCNC: 4.4 MMOL/L (ref 3.5–5.1)
RBC # BLD: 4.1 M/UL (ref 4.2–5.9)
SODIUM BLD-SCNC: 137 MMOL/L (ref 136–145)
TOTAL PROTEIN: 7 G/DL (ref 6.4–8.2)
TRIGL SERPL-MCNC: 48 MG/DL (ref 0–150)
VLDLC SERPL CALC-MCNC: 10 MG/DL
WBC # BLD: 6.3 K/UL (ref 4–11)

## 2022-08-12 NOTE — TELEPHONE ENCOUNTER
----- Message from Eddie Alcantara sent at 8/12/2022  9:34 AM EDT -----  Subject: Message to Provider    QUESTIONS  Information for Provider? Patient has symptoms possibly of Covid/cold with   a heavy cough, sore throat, chills and with the medication he's taking, he   wants to know what medication he can take. He doesn't know if he should   get a Covid test. He has similar symptoms to a cold - but doesn't do   virtual visits.  ---------------------------------------------------------------------------  --------------  Melida TAVAREZ  [phone]; OK to leave message on voicemail  ---------------------------------------------------------------------------  --------------  SCRIPT ANSWERS  Relationship to Patient?  Self

## 2022-08-12 NOTE — TELEPHONE ENCOUNTER
Called pt and relayed message to him and he is going to [facility] to get this done. No further questions.

## 2022-08-14 LAB — SARS-COV-2, PCR: DETECTED

## 2022-08-16 NOTE — RESULT ENCOUNTER NOTE
Spoke with pt and went on and on about covid symptoms. Advised him to take over the counter med for lingering symptoms.

## 2022-09-14 NOTE — PROGRESS NOTES
[address] Luciano MD                                                                                                                [address] [address] [phone] ([phone] (H)      Primary provider: Cheikh Huff MD  Patient identification: Chidi Cueto Sr. ,: 1955,Sex: male         ASSESSMENT/PLAN:  Thalia Bryant was seen today for follow-up. Diagnoses and all orders for this visit:    Idiopathic chronic gout of multiple sites without tophus    Generalized osteoarthritis    Stage 3b chronic kidney disease ([facility] [address]    Other orders  -     ULORIC 40 MG TABS tablet; TAKE ONE TABLET BY MOUTH DAILY    Polyarticular gout-asymptomatic, continue Uloric 40 mg a day. Safety labs are reviewed, normal.  Prescription renewed. Osteoarthritis-mostly affecting his hips, knees and lower back. Stressed the importance of keeping optimal weight, regular exercises. Avoid all NSAIDs because of chronic kidney disease. Recommend taking acetaminophen if the need be. Follow-up in 6 months. Patient indicates understanding and agrees with the management plan. I reviewed patients medical information and medical history in the medical records. Note is transcribed using voice recognition software. Inadvertent computerized transcription errors may be present. ##################################################################    Subjective: Follow-up for polyarticular gout and generalized osteoarthritis. Interval changes-states that he is doing well in terms of gout, no flares after starting Uloric years ago. He continues to have pain in his hips, knees, lower back, with walking, weightbearing, is affecting his ADLs and recreational activities. He is using cane for ambulation now.   He continues to gain weight, mainly has central obesity. He sparingly takes acetaminophen for arthritic pain. He has not been taking any NSAIDs. Recent labs-creatinine is elevated. No side effects from medications. Rest of ROS negative. Current Outpatient Medications   Medication Sig Dispense Refill    ULORIC 40 MG TABS tablet TAKE ONE TABLET BY MOUTH DAILY 30 tablet 11    ALPRAZolam (XANAX) 1 MG tablet Take 1 mg by mouth 3 times daily as needed for Sleep. atorvastatin (LIPITOR) 20 MG tablet Take 1 tablet by mouth daily 90 tablet 3    blood glucose test strips (TRUE METRIX BLOOD GLUCOSE TEST) strip USE TO TEST 3-4 TIMES DAILY AS NEEDED 400 strip 5    TRUEplus Lancets 33G MISC USE three to  FOUR TIMES A  each 5    vitamin D3 (CHOLECALCIFEROL) 10 MCG (400 UNIT) TABS tablet Take 400 Units by mouth daily      Ascorbic Acid (VITAMIN C) 250 MG tablet Take 250 mg by mouth daily      Biotin 1000 MCG TABS Take 1,000 mcg by mouth daily      triamcinolone (KENALOG) 0.1 % ointment Apply topically 2 times daily 80 g 0    aspirin 325 MG EC tablet Take 325 mg by mouth daily       No current facility-administered medications for this visit. Allergies   Allergen Reactions    Cephalexin     Crestor [Rosuvastatin] Other (See Comments)     myalgias    Levofloxacin     Penicillins     Succinylcholine      Does not want. Pt son had reaction to Succs and almost , pt was told not to ever take Succs. Sulfa Antibiotics     Tetracyclines & Related     Verapamil     Vicodin [Hydrocodone-Acetaminophen]      Rash; ithching         OBJECTIVE  Physical    Vitals:    09/15/22 1432   BP: 122/80         General appearance/psychiatric: Well nourished and well groomed, normal judgment. Alert, appears stated age and cooperative. MKS:   Osteoarthritic changes noted in both knees with bony swelling, bony crepitus, full flexion extension. Bilateral hip-abduction is limited about 50%.   Asymptomatic OA changes in scattered DIPs, CMC's and feet joints. Using cane for ambulation because of lower back and hip pain. Skin: No rashes. Data:  Lab Results   Component Value Date/Time    WBC 6.3 08/12/2022 01:41 PM    RBC 4.10 08/12/2022 01:41 PM    HGB 12.9 08/12/2022 01:41 PM    HCT 37.8 08/12/2022 01:41 PM     08/12/2022 01:41 PM    MCV 92.4 08/12/2022 01:41 PM    MCH 31.5 08/12/2022 01:41 PM    MCHC 34.1 08/12/2022 01:41 PM    RDW 12.7 08/12/2022 01:41 PM    LYMPHOPCT 20.7 08/12/2022 01:41 PM    MONOPCT 13.2 08/12/2022 01:41 PM    BASOPCT 0.1 08/12/2022 01:41 PM    MONOSABS 0.8 08/12/2022 01:41 PM    LYMPHSABS 1.3 08/12/2022 01:41 PM    EOSABS 0.2 08/12/2022 01:41 PM    BASOSABS 0.0 08/12/2022 01:41 PM       Chemistry        Component Value Date/Time     08/12/2022 1341    K 4.4 08/12/2022 1341     08/12/2022 1341    CO2 22 08/12/2022 1341    BUN 35 (H) 08/12/2022 1341    CREATININE 1.8 (H) 08/12/2022 1341        Component Value Date/Time    CALCIUM 9.0 08/12/2022 1341    ALKPHOS 63 08/12/2022 1341    AST 28 08/12/2022 1341    ALT 19 08/12/2022 1341    BILITOT 1.0 08/12/2022 1341          Lab Results   Component Value Date    SEDRATE 14 08/18/2016        Total time spent 30 minutes-mainly listening to patient passively-like to express multiple concerns including it healthcare providers, health conditions and so forth. He is a very nice gentleman but office visit is awfully long because of lengthy one-way conversations.   Margret Tsai MD 09/15/22

## 2022-09-15 ENCOUNTER — OFFICE VISIT (OUTPATIENT)
Dept: RHEUMATOLOGY | Age: 67
End: 2022-09-15
Payer: COMMERCIAL

## 2022-09-15 VITALS
SYSTOLIC BLOOD PRESSURE: 122 MMHG | BODY MASS INDEX: 28.25 KG/M2 | WEIGHT: 201.8 LBS | DIASTOLIC BLOOD PRESSURE: 80 MMHG | HEIGHT: 71 IN

## 2022-09-15 DIAGNOSIS — N18.32 STAGE 3B CHRONIC KIDNEY DISEASE (HCC): ICD-10-CM

## 2022-09-15 DIAGNOSIS — M1A.09X0 IDIOPATHIC CHRONIC GOUT OF MULTIPLE SITES WITHOUT TOPHUS: Primary | ICD-10-CM

## 2022-09-15 DIAGNOSIS — M15.9 GENERALIZED OSTEOARTHRITIS: ICD-10-CM

## 2022-09-15 PROBLEM — M10.09 IDIOPATHIC GOUT OF MULTIPLE SITES: Status: ACTIVE | Noted: 2022-09-15

## 2022-09-15 PROCEDURE — G8417 CALC BMI ABV UP PARAM F/U: HCPCS | Performed by: INTERNAL MEDICINE

## 2022-09-15 PROCEDURE — 99214 OFFICE O/P EST MOD 30 MIN: CPT | Performed by: INTERNAL MEDICINE

## 2022-09-15 PROCEDURE — 3017F COLORECTAL CA SCREEN DOC REV: CPT | Performed by: INTERNAL MEDICINE

## 2022-09-15 PROCEDURE — 1123F ACP DISCUSS/DSCN MKR DOCD: CPT | Performed by: INTERNAL MEDICINE

## 2022-09-15 PROCEDURE — G8427 DOCREV CUR MEDS BY ELIG CLIN: HCPCS | Performed by: INTERNAL MEDICINE

## 2022-09-15 PROCEDURE — 1036F TOBACCO NON-USER: CPT | Performed by: INTERNAL MEDICINE

## 2022-09-15 RX ORDER — FEBUXOSTAT 40 MG/1
TABLET ORAL
Qty: 30 TABLET | Refills: 11 | Status: SHIPPED | OUTPATIENT
Start: 2022-09-15

## 2022-09-20 ENCOUNTER — OFFICE VISIT (OUTPATIENT)
Dept: FAMILY MEDICINE CLINIC | Age: 67
End: 2022-09-20
Payer: COMMERCIAL

## 2022-09-20 VITALS
HEART RATE: 85 BPM | TEMPERATURE: 97.7 F | DIASTOLIC BLOOD PRESSURE: 76 MMHG | OXYGEN SATURATION: 98 % | BODY MASS INDEX: 28.01 KG/M2 | WEIGHT: 198 LBS | SYSTOLIC BLOOD PRESSURE: 128 MMHG

## 2022-09-20 DIAGNOSIS — F41.0 PANIC ATTACK: Primary | ICD-10-CM

## 2022-09-20 DIAGNOSIS — E78.2 MIXED HYPERLIPIDEMIA: ICD-10-CM

## 2022-09-20 DIAGNOSIS — K05.20 ACUTE PERIODONTITIS: ICD-10-CM

## 2022-09-20 PROCEDURE — 1036F TOBACCO NON-USER: CPT | Performed by: NURSE PRACTITIONER

## 2022-09-20 PROCEDURE — G8427 DOCREV CUR MEDS BY ELIG CLIN: HCPCS | Performed by: NURSE PRACTITIONER

## 2022-09-20 PROCEDURE — G8417 CALC BMI ABV UP PARAM F/U: HCPCS | Performed by: NURSE PRACTITIONER

## 2022-09-20 PROCEDURE — 1123F ACP DISCUSS/DSCN MKR DOCD: CPT | Performed by: NURSE PRACTITIONER

## 2022-09-20 PROCEDURE — 99214 OFFICE O/P EST MOD 30 MIN: CPT | Performed by: NURSE PRACTITIONER

## 2022-09-20 PROCEDURE — 3017F COLORECTAL CA SCREEN DOC REV: CPT | Performed by: NURSE PRACTITIONER

## 2022-09-20 RX ORDER — ALPRAZOLAM 1 MG/1
TABLET ORAL
Qty: 100 TABLET | Refills: 2 | Status: SHIPPED | OUTPATIENT
Start: 2022-09-20 | End: 2022-10-20

## 2022-09-20 RX ORDER — CHLORHEXIDINE GLUCONATE 0.12 MG/ML
15 RINSE ORAL 2 TIMES DAILY
Qty: 420 ML | Refills: 5 | Status: SHIPPED | OUTPATIENT
Start: 2022-09-20 | End: 2022-10-04

## 2022-09-20 RX ORDER — ATORVASTATIN CALCIUM 20 MG/1
20 TABLET, FILM COATED ORAL DAILY
Qty: 90 TABLET | Refills: 3 | Status: SHIPPED | OUTPATIENT
Start: 2022-09-20

## 2022-09-20 RX ORDER — AZITHROMYCIN 250 MG/1
250 TABLET, FILM COATED ORAL SEE ADMIN INSTRUCTIONS
Qty: 6 TABLET | Refills: 0 | Status: SHIPPED | OUTPATIENT
Start: 2022-09-20 | End: 2022-09-25

## 2022-09-20 NOTE — PROGRESS NOTES
Allan Erickson Sr. (:  1955) is a 69 y.o. male,Established patient, here for evaluation of the following chief complaint(s):  Medication Check      ASSESSMENT/PLAN:  1. Panic attack  -     ALPRAZolam (XANAX) 1 MG tablet; May take 1 tablet 3 times daily and as needed. , Disp-100 tablet, R-2Normal  2. Mixed hyperlipidemia  -     atorvastatin (LIPITOR) 20 MG tablet; Take 1 tablet by mouth daily, Disp-90 tablet, R-3Normal  3. Acute periodontitis  -     chlorhexidine (PERIDEX) 0.12 % solution; Take 15 mLs by mouth 2 times daily for 14 days, Disp-420 mL, R-5Normal  -     azithromycin (ZITHROMAX) 250 MG tablet; Take 1 tablet by mouth See Admin Instructions for 5 days 500mg on day 1 followed by 250mg on days 2 - 5, Disp-6 tablet, R-0Normal      No follow-ups on file. SUBJECTIVE/OBJECTIVE:      Current Outpatient Medications   Medication Sig Dispense Refill    ALPRAZolam (XANAX) 1 MG tablet May take 1 tablet 3 times daily and as needed.  100 tablet 2    atorvastatin (LIPITOR) 20 MG tablet Take 1 tablet by mouth daily 90 tablet 3    chlorhexidine (PERIDEX) 0.12 % solution Take 15 mLs by mouth 2 times daily for 14 days 420 mL 5    azithromycin (ZITHROMAX) 250 MG tablet Take 1 tablet by mouth See Admin Instructions for 5 days 500mg on day 1 followed by 250mg on days 2 - 5 6 tablet 0    ULORIC 40 MG TABS tablet TAKE ONE TABLET BY MOUTH DAILY 30 tablet 11    blood glucose test strips (TRUE METRIX BLOOD GLUCOSE TEST) strip USE TO TEST 3-4 TIMES DAILY AS NEEDED 400 strip 5    TRUEplus Lancets 33G MISC USE three to  FOUR TIMES A  each 5    vitamin D3 (CHOLECALCIFEROL) 10 MCG (400 UNIT) TABS tablet Take 400 Units by mouth daily      Ascorbic Acid (VITAMIN C) 250 MG tablet Take 250 mg by mouth daily      Biotin 1000 MCG TABS Take 1,000 mcg by mouth daily      triamcinolone (KENALOG) 0.1 % ointment Apply topically 2 times daily 80 g 0    aspirin 325 MG EC tablet Take 325 mg by mouth daily       No current facility-administered medications for this visit. Review of Systems   HENT:  Positive for dental problem. Believes a dental abscess is forming   Psychiatric/Behavioral:  The patient is nervous/anxious. Vitals:    09/20/22 1519   Weight: 198 lb (89.8 kg)       Physical Exam  Constitutional:       Appearance: Normal appearance. He is well-developed and normal weight. HENT:      Head: Normocephalic. Right Ear: Tympanic membrane normal.      Left Ear: Tympanic membrane normal.      Mouth/Throat:      Mouth: Mucous membranes are moist.   Eyes:      Pupils: Pupils are equal, round, and reactive to light. Cardiovascular:      Rate and Rhythm: Normal rate. Pulmonary:      Effort: Pulmonary effort is normal.   Musculoskeletal:         General: Normal range of motion. Cervical back: Normal range of motion. Skin:     General: Skin is warm and dry. Neurological:      Mental Status: He is alert and oriented to person, place, and time. Psychiatric:         Mood and Affect: Mood normal.         Behavior: Behavior normal.         Thought Content: Thought content normal.               An electronic signature was used to authenticate this note.     --SHAI Solis - CNP

## 2022-10-13 ENCOUNTER — OFFICE VISIT (OUTPATIENT)
Dept: ENDOCRINOLOGY | Age: 67
End: 2022-10-13
Payer: COMMERCIAL

## 2022-10-13 VITALS
HEIGHT: 71 IN | OXYGEN SATURATION: 95 % | SYSTOLIC BLOOD PRESSURE: 132 MMHG | HEART RATE: 63 BPM | BODY MASS INDEX: 27.58 KG/M2 | WEIGHT: 197 LBS | DIASTOLIC BLOOD PRESSURE: 80 MMHG

## 2022-10-13 DIAGNOSIS — E11.9 CONTROLLED TYPE 2 DIABETES MELLITUS WITHOUT COMPLICATION, WITHOUT LONG-TERM CURRENT USE OF INSULIN (HCC): Primary | ICD-10-CM

## 2022-10-13 LAB — HBA1C MFR BLD: 6.4 %

## 2022-10-13 PROCEDURE — 2022F DILAT RTA XM EVC RTNOPTHY: CPT | Performed by: INTERNAL MEDICINE

## 2022-10-13 PROCEDURE — 1036F TOBACCO NON-USER: CPT | Performed by: INTERNAL MEDICINE

## 2022-10-13 PROCEDURE — G8484 FLU IMMUNIZE NO ADMIN: HCPCS | Performed by: INTERNAL MEDICINE

## 2022-10-13 PROCEDURE — 3044F HG A1C LEVEL LT 7.0%: CPT | Performed by: INTERNAL MEDICINE

## 2022-10-13 PROCEDURE — 1123F ACP DISCUSS/DSCN MKR DOCD: CPT | Performed by: INTERNAL MEDICINE

## 2022-10-13 PROCEDURE — 3017F COLORECTAL CA SCREEN DOC REV: CPT | Performed by: INTERNAL MEDICINE

## 2022-10-13 PROCEDURE — 99212 OFFICE O/P EST SF 10 MIN: CPT | Performed by: INTERNAL MEDICINE

## 2022-10-13 PROCEDURE — G8427 DOCREV CUR MEDS BY ELIG CLIN: HCPCS | Performed by: INTERNAL MEDICINE

## 2022-10-13 PROCEDURE — 83036 HEMOGLOBIN GLYCOSYLATED A1C: CPT | Performed by: INTERNAL MEDICINE

## 2022-10-13 PROCEDURE — G8417 CALC BMI ABV UP PARAM F/U: HCPCS | Performed by: INTERNAL MEDICINE

## 2022-10-13 RX ORDER — CALCIUM CITRATE/VITAMIN D3 200MG-6.25
TABLET ORAL
Qty: 400 STRIP | Refills: 5 | Status: SHIPPED | OUTPATIENT
Start: 2022-10-13

## 2022-10-13 RX ORDER — GLUCOSAM/CHON-MSM1/C/MANG/BOSW 500-416.6
TABLET ORAL
Qty: 400 EACH | Refills: 5 | Status: SHIPPED | OUTPATIENT
Start: 2022-10-13

## 2022-10-13 NOTE — PROGRESS NOTES
Seen as f/u patient for diabetes    Interim:  Glucose higher  No issues    Reports issues with Lipitor, muscle aches    Diagnosed with Type 2 diabetes mellitus 5 years  Known diabetic complications: Nephropathy( cause?)     Current diabetic medications   onglyza 5mg-  Off of it as BG improved  GLipizide - stopped as had hypoglycemia- BG 38    Mild, controlled    Off metformin, has CKD    Last A1c 7%<------6.4%<--- 6.8%<-----<-----6.6%<------6.4%<------6.9%<---- 6.8%<-----6.4% <------6.6%<----6.5%<-----5.9 on 3/17<------6.1 on 12/16<------- 6.2% on 8/16 <------- 6.8 on 5/16 <--- 10.1<----- 7.5 on 8/15<--- 9.4 <---- 7.9    Prior visit with dietician: No  Current diet: on average, 3 meals per day  Started to restrict CHO  Current exercise: Walking  Current monitoring regimen: home blood tests 3-4  times daily     Has brought blood glucose log/meter yes  Home blood sugar records:80-100s  Any episodes of hypoglycemia? no    No Hx of CAD , PVD, CVA    Hyperlipidemia:stable, tolerating Zocor 20mg   on 1/13    LDL 72 on 9/17   on 12/18  LDL 57 on 12/19  lipitor 40mg  LDL 55 on 9/20   on 1/21   Now on lipitor 20mg    H/o crestor use-reports side effects    Repatha was denied per patient    Last eye exam: 9/20  Last foot exam: 12/19  Last microalbumin to creatinine ratio: 9/17   Cr 1.9 on 3/16---> 1.5 on 8/16---> 1.4 on 10/16--> 1.2 on 6/18--> 1.3---> 1.7    HTN: Stable, lisinopril 10mg- off it due to low BP    He has arthritis, he was inquiring about steroids and effect on DM    Past Medical History:   Diagnosis Date    Anxiety     HTN (hypertension)     Hyperlipemia     Panic disorder     Type II or unspecified type diabetes mellitus without mention of complication, not stated as uncontrolled     Umbilical hernia      Past Surgical History:   Procedure Laterality Date    CHOLECYSTECTOMY, LAPAROSCOPIC  09/16/2016    with gram    COLONOSCOPY N/A 2/24/2022    COLONOSCOPY WITH BIOPSY performed by Sri Larson Gianfranco Fuentes MD at [address] [place] [address] Drive  09/16/2016     Current Outpatient Medications   Medication Sig Dispense Refill    ALPRAZolam (XANAX) 1 MG tablet May take 1 tablet 3 times daily and as needed. 100 tablet 2    atorvastatin (LIPITOR) 20 MG tablet Take 1 tablet by mouth daily 90 tablet 3    ULORIC 40 MG TABS tablet TAKE ONE TABLET BY MOUTH DAILY 30 tablet 11    blood glucose test strips (TRUE METRIX BLOOD GLUCOSE TEST) strip USE TO TEST 3-4 TIMES DAILY AS NEEDED 400 strip 5    TRUEplus Lancets 33G MISC USE three to  FOUR TIMES A  each 5    vitamin D3 (CHOLECALCIFEROL) 10 MCG (400 UNIT) TABS tablet Take 400 Units by mouth daily      Ascorbic Acid (VITAMIN C) 250 MG tablet Take 250 mg by mouth daily      Biotin 1000 MCG TABS Take 1,000 mcg by mouth daily      triamcinolone (KENALOG) 0.1 % ointment Apply topically 2 times daily 80 g 0    aspirin 325 MG EC tablet Take 325 mg by mouth daily       No current facility-administered medications for this visit. Review of Systems  Constitutional: Negative for weight loss and malaise/fatigue. Negative for fever and chills. HENT: Negative for hearing loss, ear pain, nosebleeds, neck pain and tinnitus. Eyes: Negative for blurred vision. Negative for double vision, photophobia and pain. Respiratory: Negative for cough and sputum production. Cardiovascular: Negative for chest pain, palpitations and leg swelling. Gastrointestinal: Negative for nausea, vomiting and abdominal pain. Genitourinary: Negative for dysuria, urgency and frequency. Musculoskeletal: Negative for back pain. No joint pain  Skin: Negative for itching and rash. Neurological: Negative for dizziness. Negative for tingling, tremors, focal weakness and headaches. Endo/Heme/Allergies: see HPI  Psychiatric/Behavioral: Negative for depression and substance abuse.          Objective:     Vitals:    10/13/22 1540   BP: 132/80   Pulse:    SpO2:            There were no vitals taken for this visit. Wt Readings from Last 3 Encounters:   09/20/22 198 lb (89.8 kg)   09/15/22 201 lb 12.8 oz (91.5 kg)   06/16/22 202 lb 6.4 oz (91.8 kg)     Constitutional: Well-developed, appears stated age, cooperative, in no acute distress  H/E/N/M/T:atraumatic, normocephalic, external ears, nose, lips normal without lesions  Eyes: Lids, lashes, conjunctivae and sclerae normal, No proptosis, no redness  Neck: supple, symmetrical, no swelling  Skin: No obvious rashes or lesions present. Skin and hair texture normal  Psychiatric: Judgement and Insight:  judgement and insight appear normal  Neuro: Normal without focal findings, speech is normal normal, speech is spontaneous  Chest: No labored breathing, no chest deformity, no stridor  Musculoskeletal: No joint deformity, swelling      8/18  Skeletal foot exam is normal, no skin lesions, toenails are normal, 10 g monofilament is detected , DP palpable, Callus+      Lab Reviewed   No components found for: CHLPL  Lab Results   Component Value Date    TRIG 48 08/12/2022    TRIG 72 11/03/2021    TRIG 46 03/23/2021     Lab Results   Component Value Date    HDL 46 08/12/2022    HDL 44 11/03/2021    HDL 47 03/23/2021     Lab Results   Component Value Date    LDLCALC 47 08/12/2022    LDLCALC 119 (H) 11/03/2021    LDLCALC 68 03/23/2021     Lab Results   Component Value Date    LABVLDL 10 08/12/2022    LABVLDL 14 11/03/2021    LABVLDL 9 03/23/2021     Lab Results   Component Value Date    LABA1C 6.4 03/17/2022       Assessment:     Eugene Barr Sr. is a 69 y.o. male with :    1.T2DM: Longstanding, controlled, has CKD, need to avoid sensitizer and SGLT-2 inhibitor. Had low with Glipizide,Off DPP IV given lows, has lost weight, dietary changes, Blood glucose improved, A1c at goal, re enforced changes made by patient. He has not been exercising much, advised walking /life style change.   A1c at goal.Add tradjenta or [place] and [place] if needed  2. HTN: BP at goal , off medication  3. HLD: LDL at goal but statin intolerance, muscle aches. PCSK-9 inhibitor not covered per patient. He will be seeing cardiology  Takes Vit D 1000 IU , vit D 38, switched to D2, did not help  Can go back to D3  He is back on lipitor 20mg  4. CKDIII: saw Nephrology but not following now  5. Gout  6. Panic Disorder  7. ED: Libido is good, nl testosterone, as he would like evaluated but concerned about side effects, discussed it may not help ED much, advised see urology      Plan:      Hold metformin and onglyza   Advise to check blood sugar 1 times a day but he would like to monitor more frequently as helped controlled- recommend TID                                                               Patient to send blood sugar log for titration. Advise to exercise regularly. Advise to low simple carbohydrate and protein with each  meal diet. 40gm of CHO meal per day   Diabetes Care: recommend yearly eye exam, foot exam and urine microalbumin to   creatinine ratio.      -Hyperlipidemia, LDL goal is <100 mg/dl   -Hypertension: Continue same  -Daily ASA: 81mg  -Smoking status: non smoker

## 2022-10-26 ENCOUNTER — NURSE ONLY (OUTPATIENT)
Dept: FAMILY MEDICINE CLINIC | Age: 67
End: 2022-10-26
Payer: COMMERCIAL

## 2022-10-26 PROCEDURE — 90471 IMMUNIZATION ADMIN: CPT | Performed by: FAMILY MEDICINE

## 2022-10-26 PROCEDURE — 90694 VACC AIIV4 NO PRSRV 0.5ML IM: CPT | Performed by: FAMILY MEDICINE

## 2022-12-06 ENCOUNTER — TELEPHONE (OUTPATIENT)
Dept: FAMILY MEDICINE CLINIC | Age: 67
End: 2022-12-06

## 2022-12-06 NOTE — TELEPHONE ENCOUNTER
Pt called and would like Ivette Nicole to call him because he states that he might have the flu and would like to know if he should take the muscinex D or DM? Please call pt.     LOV: 9/20/22

## 2022-12-20 ENCOUNTER — OFFICE VISIT (OUTPATIENT)
Dept: FAMILY MEDICINE CLINIC | Age: 67
End: 2022-12-20
Payer: COMMERCIAL

## 2022-12-20 VITALS
HEART RATE: 71 BPM | BODY MASS INDEX: 27.44 KG/M2 | OXYGEN SATURATION: 97 % | DIASTOLIC BLOOD PRESSURE: 72 MMHG | SYSTOLIC BLOOD PRESSURE: 136 MMHG | TEMPERATURE: 97.1 F | WEIGHT: 194 LBS

## 2022-12-20 DIAGNOSIS — F41.9 ANXIETY: Primary | ICD-10-CM

## 2022-12-20 PROBLEM — F41.0 PANIC ATTACK: Status: RESOLVED | Noted: 2022-03-17 | Resolved: 2022-12-20

## 2022-12-20 PROCEDURE — 3017F COLORECTAL CA SCREEN DOC REV: CPT | Performed by: NURSE PRACTITIONER

## 2022-12-20 PROCEDURE — G8417 CALC BMI ABV UP PARAM F/U: HCPCS | Performed by: NURSE PRACTITIONER

## 2022-12-20 PROCEDURE — G8484 FLU IMMUNIZE NO ADMIN: HCPCS | Performed by: NURSE PRACTITIONER

## 2022-12-20 PROCEDURE — 1036F TOBACCO NON-USER: CPT | Performed by: NURSE PRACTITIONER

## 2022-12-20 PROCEDURE — 3078F DIAST BP <80 MM HG: CPT | Performed by: NURSE PRACTITIONER

## 2022-12-20 PROCEDURE — 3074F SYST BP LT 130 MM HG: CPT | Performed by: NURSE PRACTITIONER

## 2022-12-20 PROCEDURE — 1123F ACP DISCUSS/DSCN MKR DOCD: CPT | Performed by: NURSE PRACTITIONER

## 2022-12-20 PROCEDURE — G8427 DOCREV CUR MEDS BY ELIG CLIN: HCPCS | Performed by: NURSE PRACTITIONER

## 2022-12-20 PROCEDURE — 99213 OFFICE O/P EST LOW 20 MIN: CPT | Performed by: NURSE PRACTITIONER

## 2022-12-20 RX ORDER — ALPRAZOLAM 1 MG/1
1 TABLET ORAL 3 TIMES DAILY
COMMUNITY
End: 2022-12-20 | Stop reason: SDUPTHER

## 2022-12-20 RX ORDER — ALPRAZOLAM 1 MG/1
TABLET ORAL
Qty: 100 TABLET | Refills: 2 | Status: SHIPPED | OUTPATIENT
Start: 2022-12-20 | End: 2024-03-20

## 2022-12-20 ASSESSMENT — ENCOUNTER SYMPTOMS
NAUSEA: 0
COUGH: 0
EYE REDNESS: 0
SINUS PAIN: 0
ABDOMINAL PAIN: 0
SINUS PRESSURE: 0
RHINORRHEA: 0
ABDOMINAL DISTENTION: 0
EYE ITCHING: 0
VOMITING: 0
BACK PAIN: 0
STRIDOR: 0
WHEEZING: 0
CHEST TIGHTNESS: 0
TROUBLE SWALLOWING: 0
DIARRHEA: 0
SHORTNESS OF BREATH: 0
CONSTIPATION: 0
EYE PAIN: 0
EYE DISCHARGE: 0

## 2022-12-20 NOTE — PROGRESS NOTES
Ericka Hidalgo Sr. (:  1955) is a 69 y.o. male,Established patient, here for evaluation of the following chief complaint(s):  Medication Check      ASSESSMENT/PLAN:  1. Anxiety  Assessment & Plan:  Alprazolam refilled  He is still taking 1 tablet 3 times a day and as needed  No concern for diversion  Follow-up 3 months    Orders:  -     ALPRAZolam (XANAX) 1 MG tablet; 3 times daily and as needed, Disp-100 tablet, R-2Normal      Return in about 3 months (around 3/20/2023). SUBJECTIVE/OBJECTIVE:  Presents for refill on alprazolam.  Has a history of anxiety. Has tried to come off this medication in the past without good results. He will remain on this. He does not mix it with alcohol. OARRS report consistent with use. Current Outpatient Medications   Medication Sig Dispense Refill    ALPRAZolam (XANAX) 1 MG tablet 3 times daily and as needed 100 tablet 2    blood glucose test strips (TRUE METRIX BLOOD GLUCOSE TEST) strip USE TO TEST 3-4 TIMES DAILY AS NEEDED 400 strip 5    TRUEplus Lancets 33G MISC USE three to  FOUR TIMES A  each 5    atorvastatin (LIPITOR) 20 MG tablet Take 1 tablet by mouth daily 90 tablet 3    ULORIC 40 MG TABS tablet TAKE ONE TABLET BY MOUTH DAILY 30 tablet 11    vitamin D3 (CHOLECALCIFEROL) 10 MCG (400 UNIT) TABS tablet Take 400 Units by mouth daily      Ascorbic Acid (VITAMIN C) 250 MG tablet Take 250 mg by mouth daily      Biotin 1000 MCG TABS Take 1,000 mcg by mouth daily      triamcinolone (KENALOG) 0.1 % ointment Apply topically 2 times daily 80 g 0    aspirin 325 MG EC tablet Take 325 mg by mouth daily       No current facility-administered medications for this visit. Review of Systems   Constitutional:  Negative for chills, fatigue and fever. HENT:  Negative for congestion, ear pain, hearing loss, rhinorrhea, sinus pressure, sinus pain, tinnitus and trouble swallowing. Eyes:  Negative for pain, discharge, redness and itching.    Respiratory:  Negative for cough, chest tightness, shortness of breath, wheezing and stridor. Cardiovascular:  Negative for chest pain, palpitations and leg swelling. Gastrointestinal:  Negative for abdominal distention, abdominal pain, constipation, diarrhea, nausea and vomiting. Genitourinary:  Negative for difficulty urinating, dysuria, hematuria and urgency. Musculoskeletal:  Negative for back pain, joint swelling, myalgias and neck pain. Skin:  Negative for rash and wound. Neurological:  Negative for dizziness and headaches. All other systems reviewed and are negative. Vitals:    12/20/22 1533   BP: 136/72   Site: Right Upper Arm   Position: Sitting   Cuff Size: Medium Adult   Pulse: 71   Temp: 97.1 °F (36.2 °C)   SpO2: 97%   Weight: 194 lb (88 kg)       Physical Exam  Constitutional:       Appearance: Normal appearance. He is well-developed and normal weight. HENT:      Head: Normocephalic. Mouth/Throat:      Mouth: Mucous membranes are moist.   Eyes:      Pupils: Pupils are equal, round, and reactive to light. Cardiovascular:      Rate and Rhythm: Normal rate and regular rhythm. Pulmonary:      Effort: Pulmonary effort is normal.      Breath sounds: Normal breath sounds. Musculoskeletal:         General: Normal range of motion. Cervical back: Normal range of motion. Skin:     General: Skin is warm and dry. Neurological:      Mental Status: He is alert and oriented to person, place, and time. Psychiatric:         Mood and Affect: Mood normal.         Behavior: Behavior normal.               An electronic signature was used to authenticate this note.     --Lavelle Julio, APRN - CNP

## 2023-03-08 ENCOUNTER — TELEPHONE (OUTPATIENT)
Dept: ENDOCRINOLOGY | Age: 68
End: 2023-03-08

## 2023-03-08 NOTE — TELEPHONE ENCOUNTER
Pt wants to know what Dr. Adin Hamlin recommends to get his sugars down? They are in the range of 180-200 go down to 132-112. He is only eating 2 meals a day sometimes 1 and snacks. Does not want [place] and [place].       He would like a call back   # [phone]

## 2023-03-09 NOTE — TELEPHONE ENCOUNTER
Left detailed message informing patient that we will check A1c at office visit next month and the MD will discuss with him possibly starting glipizide.

## 2023-03-16 ENCOUNTER — OFFICE VISIT (OUTPATIENT)
Dept: RHEUMATOLOGY | Age: 68
End: 2023-03-16
Payer: COMMERCIAL

## 2023-03-16 VITALS
WEIGHT: 194 LBS | BODY MASS INDEX: 27.16 KG/M2 | DIASTOLIC BLOOD PRESSURE: 80 MMHG | SYSTOLIC BLOOD PRESSURE: 120 MMHG | HEIGHT: 71 IN

## 2023-03-16 DIAGNOSIS — M1A.09X0 IDIOPATHIC CHRONIC GOUT OF MULTIPLE SITES WITHOUT TOPHUS: Primary | ICD-10-CM

## 2023-03-16 PROCEDURE — 3074F SYST BP LT 130 MM HG: CPT | Performed by: INTERNAL MEDICINE

## 2023-03-16 PROCEDURE — 3078F DIAST BP <80 MM HG: CPT | Performed by: INTERNAL MEDICINE

## 2023-03-16 PROCEDURE — 1036F TOBACCO NON-USER: CPT | Performed by: INTERNAL MEDICINE

## 2023-03-16 PROCEDURE — G8417 CALC BMI ABV UP PARAM F/U: HCPCS | Performed by: INTERNAL MEDICINE

## 2023-03-16 PROCEDURE — G8427 DOCREV CUR MEDS BY ELIG CLIN: HCPCS | Performed by: INTERNAL MEDICINE

## 2023-03-16 PROCEDURE — G8484 FLU IMMUNIZE NO ADMIN: HCPCS | Performed by: INTERNAL MEDICINE

## 2023-03-16 PROCEDURE — 3017F COLORECTAL CA SCREEN DOC REV: CPT | Performed by: INTERNAL MEDICINE

## 2023-03-16 PROCEDURE — 99214 OFFICE O/P EST MOD 30 MIN: CPT | Performed by: INTERNAL MEDICINE

## 2023-03-16 PROCEDURE — 1123F ACP DISCUSS/DSCN MKR DOCD: CPT | Performed by: INTERNAL MEDICINE

## 2023-03-16 RX ORDER — FEBUXOSTAT 40 MG/1
TABLET ORAL
Qty: 30 TABLET | Refills: 5 | Status: SHIPPED | OUTPATIENT
Start: 2023-03-16

## 2023-03-17 NOTE — PROGRESS NOTES
[address] Delmar Luciano MD                                                                                                                [address], [facility] [address]                                                (B) [phone] (F)      Primary provider: Rajani Laguerre MD  Patient identification: Haja Barraza Sr. ,: 1955,Sex: male         ASSESSMENT/PLAN:  Jaylon Wills was seen today for follow-up. Diagnoses and all orders for this visit:    Idiopathic chronic gout of multiple sites without tophus    Other orders  -     ULORIC 40 MG TABS tablet; TAKE ONE TABLET BY MOUTH DAILY    Polyarticular gout-asymptomatic, continue Uloric 40 mg a day. Prescription renewed. Osteoarthritis-mostly affecting his hips, knees and lower back. Tylenol prn    Avoid all NSAIDs because of chronic kidney disease. Patient indicates understanding and agrees with the management plan. I reviewed patients medical information and medical history in the medical records. Note is transcribed using voice recognition software. Inadvertent computerized transcription errors may be present. ##################################################################    Subjective: Follow-up for polyarticular gout and generalized osteoarthritis. Interval changes- other than hip pain from OA, he is doing well. No gout flares. On Uloric. No side effects,  He sparingly takes acetaminophen for arthritic pain. He has not been taking any NSAIDs. Recent labs-creatinine is elevated. No side effects from medications. Rest of ROS negative.     Current Outpatient Medications   Medication Sig Dispense Refill    ULORIC 40 MG TABS tablet TAKE ONE TABLET BY MOUTH DAILY 30 tablet 5    ALPRAZolam (XANAX) 1 MG tablet 3 times daily and as needed 100 tablet 2    blood glucose test strips (TRUE METRIX BLOOD GLUCOSE TEST) strip USE TO TEST 3-4 TIMES DAILY AS NEEDED 400 strip 5    TRUEplus Lancets 33G MISC USE three to  FOUR TIMES A  each 5    atorvastatin (LIPITOR) 20 MG tablet Take 1 tablet by mouth daily 90 tablet 3    vitamin D3 (CHOLECALCIFEROL) 10 MCG (400 UNIT) TABS tablet Take 400 Units by mouth daily      Ascorbic Acid (VITAMIN C) 250 MG tablet Take 250 mg by mouth daily      Biotin 1000 MCG TABS Take 1,000 mcg by mouth daily      triamcinolone (KENALOG) 0.1 % ointment Apply topically 2 times daily 80 g 0    aspirin 325 MG EC tablet Take 325 mg by mouth daily       No current facility-administered medications for this visit. Allergies   Allergen Reactions    Cephalexin     Crestor [Rosuvastatin] Other (See Comments)     myalgias    Levofloxacin     Penicillins     Succinylcholine      Does not want. Pt son had reaction to Succs and almost , pt was told not to ever take Succs. Sulfa Antibiotics     Tetracyclines & Related     Verapamil     Vicodin [Hydrocodone-Acetaminophen]      Rash; ithching         OBJECTIVE  Physical    Vitals:    23 1427   BP: 120/80         General appearance/psychiatric: Well nourished and well groomed, normal judgment. Alert, appears stated age and cooperative. MKS: exam findings unchanged as below-  Osteoarthritic changes noted in both knees with bony swelling, bony crepitus, full flexion extension. Bilateral hip-abduction is limited about 50%. Asymptomatic OA changes in scattered DIPs, CMC's and feet joints. Using cane for ambulation because of lower back and hip pain. Skin: No rashes.     Data:  Lab Results   Component Value Date/Time    WBC 6.3 2022 01:41 PM    RBC 4.10 2022 01:41 PM    HGB 12.9 2022 01:41 PM    HCT 37.8 2022 01:41 PM     2022 01:41 PM    MCV 92.4 2022 01:41 PM    MCH 31.5 2022 01:41 PM    MCHC 34.1 2022 01:41 PM    RDW 12.7 2022 01:41 PM    LYMPHOPCT 20.7 2022 01:41 PM MONOPCT 13.2 08/12/2022 01:41 PM    BASOPCT 0.1 08/12/2022 01:41 PM    MONOSABS 0.8 08/12/2022 01:41 PM    LYMPHSABS 1.3 08/12/2022 01:41 PM    EOSABS 0.2 08/12/2022 01:41 PM    BASOSABS 0.0 08/12/2022 01:41 PM       Chemistry        Component Value Date/Time     08/12/2022 1341    K 4.4 08/12/2022 1341     08/12/2022 1341    CO2 22 08/12/2022 1341    BUN 35 (H) 08/12/2022 1341    CREATININE 1.8 (H) 08/12/2022 1341        Component Value Date/Time    CALCIUM 9.0 08/12/2022 1341    ALKPHOS 63 08/12/2022 1341    AST 28 08/12/2022 1341    ALT 19 08/12/2022 1341    BILITOT 1.0 08/12/2022 1341          Lab Results   Component Value Date    SEDRATE 14 08/18/2016        Total time spent 30 minutes-mainly listening to patient passively-like to express multiple concerns including it healthcare providers, health conditions and so forth. He is a very nice gentleman but office visit is awfully long because of lengthy one-way conversations.   Aminata Quiles MD 03/17/23

## 2023-03-21 ENCOUNTER — OFFICE VISIT (OUTPATIENT)
Dept: FAMILY MEDICINE CLINIC | Age: 68
End: 2023-03-21
Payer: COMMERCIAL

## 2023-03-21 VITALS
OXYGEN SATURATION: 98 % | BODY MASS INDEX: 28.72 KG/M2 | DIASTOLIC BLOOD PRESSURE: 76 MMHG | WEIGHT: 203 LBS | TEMPERATURE: 97.5 F | HEART RATE: 75 BPM | SYSTOLIC BLOOD PRESSURE: 132 MMHG

## 2023-03-21 DIAGNOSIS — F41.9 ANXIETY: ICD-10-CM

## 2023-03-21 DIAGNOSIS — M1A.09X0 IDIOPATHIC CHRONIC GOUT OF MULTIPLE SITES WITHOUT TOPHUS: ICD-10-CM

## 2023-03-21 DIAGNOSIS — I10 ESSENTIAL HYPERTENSION: ICD-10-CM

## 2023-03-21 DIAGNOSIS — N18.32 STAGE 3B CHRONIC KIDNEY DISEASE (HCC): ICD-10-CM

## 2023-03-21 DIAGNOSIS — E11.21 TYPE 2 DIABETES MELLITUS WITH DIABETIC NEPHROPATHY, UNSPECIFIED WHETHER LONG TERM INSULIN USE (HCC): Primary | ICD-10-CM

## 2023-03-21 PROBLEM — C18.9 MALIGNANT NEOPLASM OF COLON, UNSPECIFIED PART OF COLON (HCC): Status: RESOLVED | Noted: 2023-03-21 | Resolved: 2023-03-21

## 2023-03-21 PROBLEM — C18.9 MALIGNANT NEOPLASM OF COLON, UNSPECIFIED PART OF COLON (HCC): Status: ACTIVE | Noted: 2023-03-21

## 2023-03-21 PROCEDURE — 2022F DILAT RTA XM EVC RTNOPTHY: CPT | Performed by: NURSE PRACTITIONER

## 2023-03-21 PROCEDURE — 3078F DIAST BP <80 MM HG: CPT | Performed by: NURSE PRACTITIONER

## 2023-03-21 PROCEDURE — 3075F SYST BP GE 130 - 139MM HG: CPT | Performed by: NURSE PRACTITIONER

## 2023-03-21 PROCEDURE — G8427 DOCREV CUR MEDS BY ELIG CLIN: HCPCS | Performed by: NURSE PRACTITIONER

## 2023-03-21 PROCEDURE — 3017F COLORECTAL CA SCREEN DOC REV: CPT | Performed by: NURSE PRACTITIONER

## 2023-03-21 PROCEDURE — 1123F ACP DISCUSS/DSCN MKR DOCD: CPT | Performed by: NURSE PRACTITIONER

## 2023-03-21 PROCEDURE — G8484 FLU IMMUNIZE NO ADMIN: HCPCS | Performed by: NURSE PRACTITIONER

## 2023-03-21 PROCEDURE — 99214 OFFICE O/P EST MOD 30 MIN: CPT | Performed by: NURSE PRACTITIONER

## 2023-03-21 PROCEDURE — 3046F HEMOGLOBIN A1C LEVEL >9.0%: CPT | Performed by: NURSE PRACTITIONER

## 2023-03-21 PROCEDURE — 1036F TOBACCO NON-USER: CPT | Performed by: NURSE PRACTITIONER

## 2023-03-21 PROCEDURE — G8417 CALC BMI ABV UP PARAM F/U: HCPCS | Performed by: NURSE PRACTITIONER

## 2023-03-21 RX ORDER — ALPRAZOLAM 1 MG/1
TABLET ORAL
Qty: 100 TABLET | Refills: 2 | Status: SHIPPED | OUTPATIENT
Start: 2023-03-21 | End: 2023-03-21

## 2023-03-21 RX ORDER — ALPRAZOLAM 1 MG/1
TABLET ORAL
Qty: 120 TABLET | Refills: 2 | Status: SHIPPED | OUTPATIENT
Start: 2023-03-21 | End: 2024-06-19

## 2023-03-21 ASSESSMENT — PATIENT HEALTH QUESTIONNAIRE - PHQ9
2. FEELING DOWN, DEPRESSED OR HOPELESS: 0
SUM OF ALL RESPONSES TO PHQ QUESTIONS 1-9: 0
SUM OF ALL RESPONSES TO PHQ9 QUESTIONS 1 & 2: 0
1. LITTLE INTEREST OR PLEASURE IN DOING THINGS: 0

## 2023-03-21 NOTE — ASSESSMENT & PLAN NOTE
Decreasing, follows nephro and rheum  No NSAIDS    Lab Results   Component Value Date    LABGLOM 38 (A) 08/12/2022    LABGLOM 40 (A) 09/02/2021    LABGLOM 47 (A) 04/22/2021    LABGLOM 40 (A) 03/23/2021    LABGLOM 55 (A) 12/13/2019

## 2023-03-21 NOTE — ASSESSMENT & PLAN NOTE
Lab Results   Component Value Date    LABA1C 6.4 10/13/2022    LABA1C 6.4 03/17/2022    LABA1C 6.8 08/23/2021    LABA1C 6.4 02/04/2021    LABA1C 6.5 09/08/2020   Repeat labs in October with annual

## 2023-03-21 NOTE — PROGRESS NOTES
atorvastatin (LIPITOR) 20 MG tablet Take 1 tablet by mouth daily 90 tablet 3    vitamin D3 (CHOLECALCIFEROL) 10 MCG (400 UNIT) TABS tablet Take 400 Units by mouth daily      Ascorbic Acid (VITAMIN C) 250 MG tablet Take 250 mg by mouth daily      Biotin 1000 MCG TABS Take 1,000 mcg by mouth daily      triamcinolone (KENALOG) 0.1 % ointment Apply topically 2 times daily 80 g 0    aspirin 325 MG EC tablet Take 325 mg by mouth daily       No current facility-administered medications for this visit. Review of Systems   All other systems reviewed and are negative. Vitals:    03/21/23 1512   BP: 132/76   Site: Left Upper Arm   Position: Sitting   Cuff Size: Medium Adult   Pulse: 75   Temp: 97.5 °F (36.4 °C)   SpO2: 98%   Weight: 203 lb (92.1 kg)       Physical Exam  Constitutional:       Appearance: Normal appearance. He is well-developed and normal weight. HENT:      Head: Normocephalic. Mouth/Throat:      Mouth: Mucous membranes are moist.   Eyes:      Pupils: Pupils are equal, round, and reactive to light. Cardiovascular:      Rate and Rhythm: Normal rate and regular rhythm. Pulmonary:      Effort: Pulmonary effort is normal.   Musculoskeletal:         General: Normal range of motion. Cervical back: Normal range of motion. Skin:     General: Skin is warm and dry. Neurological:      Mental Status: He is alert and oriented to person, place, and time. Psychiatric:         Mood and Affect: Mood normal.               An electronic signature was used to authenticate this note.     --Wolf Quinonez, SHAI - CNP

## 2023-06-20 ENCOUNTER — OFFICE VISIT (OUTPATIENT)
Dept: FAMILY MEDICINE CLINIC | Age: 68
End: 2023-06-20
Payer: COMMERCIAL

## 2023-06-20 VITALS
WEIGHT: 200 LBS | BODY MASS INDEX: 28.29 KG/M2 | DIASTOLIC BLOOD PRESSURE: 76 MMHG | SYSTOLIC BLOOD PRESSURE: 124 MMHG | HEART RATE: 89 BPM | TEMPERATURE: 97.8 F | OXYGEN SATURATION: 97 %

## 2023-06-20 DIAGNOSIS — I10 ESSENTIAL HYPERTENSION: Primary | ICD-10-CM

## 2023-06-20 DIAGNOSIS — N40.0 BPH WITHOUT OBSTRUCTION/LOWER URINARY TRACT SYMPTOMS: ICD-10-CM

## 2023-06-20 DIAGNOSIS — E11.21 TYPE 2 DIABETES MELLITUS WITH DIABETIC NEPHROPATHY, UNSPECIFIED WHETHER LONG TERM INSULIN USE (HCC): ICD-10-CM

## 2023-06-20 DIAGNOSIS — E78.2 MIXED HYPERLIPIDEMIA: ICD-10-CM

## 2023-06-20 DIAGNOSIS — M1A.09X0 IDIOPATHIC CHRONIC GOUT OF MULTIPLE SITES WITHOUT TOPHUS: ICD-10-CM

## 2023-06-20 DIAGNOSIS — F41.9 ANXIETY: ICD-10-CM

## 2023-06-20 PROCEDURE — 3051F HG A1C>EQUAL 7.0%<8.0%: CPT | Performed by: NURSE PRACTITIONER

## 2023-06-20 PROCEDURE — 1036F TOBACCO NON-USER: CPT | Performed by: NURSE PRACTITIONER

## 2023-06-20 PROCEDURE — 2022F DILAT RTA XM EVC RTNOPTHY: CPT | Performed by: NURSE PRACTITIONER

## 2023-06-20 PROCEDURE — 3017F COLORECTAL CA SCREEN DOC REV: CPT | Performed by: NURSE PRACTITIONER

## 2023-06-20 PROCEDURE — G8427 DOCREV CUR MEDS BY ELIG CLIN: HCPCS | Performed by: NURSE PRACTITIONER

## 2023-06-20 PROCEDURE — G8417 CALC BMI ABV UP PARAM F/U: HCPCS | Performed by: NURSE PRACTITIONER

## 2023-06-20 PROCEDURE — 3074F SYST BP LT 130 MM HG: CPT | Performed by: NURSE PRACTITIONER

## 2023-06-20 PROCEDURE — 1123F ACP DISCUSS/DSCN MKR DOCD: CPT | Performed by: NURSE PRACTITIONER

## 2023-06-20 PROCEDURE — 99214 OFFICE O/P EST MOD 30 MIN: CPT | Performed by: NURSE PRACTITIONER

## 2023-06-20 PROCEDURE — 3078F DIAST BP <80 MM HG: CPT | Performed by: NURSE PRACTITIONER

## 2023-06-20 RX ORDER — ALPRAZOLAM 1 MG/1
TABLET ORAL
Qty: 120 TABLET | Refills: 2 | Status: SHIPPED | OUTPATIENT
Start: 2023-06-20 | End: 2023-07-20

## 2023-06-20 SDOH — ECONOMIC STABILITY: FOOD INSECURITY: WITHIN THE PAST 12 MONTHS, THE FOOD YOU BOUGHT JUST DIDN'T LAST AND YOU DIDN'T HAVE MONEY TO GET MORE.: NEVER TRUE

## 2023-06-20 SDOH — ECONOMIC STABILITY: INCOME INSECURITY: HOW HARD IS IT FOR YOU TO PAY FOR THE VERY BASICS LIKE FOOD, HOUSING, MEDICAL CARE, AND HEATING?: NOT HARD AT ALL

## 2023-06-20 SDOH — ECONOMIC STABILITY: FOOD INSECURITY: WITHIN THE PAST 12 MONTHS, YOU WORRIED THAT YOUR FOOD WOULD RUN OUT BEFORE YOU GOT MONEY TO BUY MORE.: NEVER TRUE

## 2023-06-20 SDOH — ECONOMIC STABILITY: HOUSING INSECURITY
IN THE LAST 12 MONTHS, WAS THERE A TIME WHEN YOU DID NOT HAVE A STEADY PLACE TO SLEEP OR SLEPT IN A SHELTER (INCLUDING NOW)?: NO

## 2023-06-20 NOTE — PROGRESS NOTES
Frandy Zavala Sr. (:  1955) is a 77 y.o. male,Established patient, here for evaluation of the following chief complaint(s):  Medication Check      ASSESSMENT/PLAN:  1. Essential hypertension  Assessment & Plan:   Well-controlled, continue current medications  Orders:  -     Comprehensive Metabolic Panel; Future  2. Mixed hyperlipidemia  -     LIPID PANEL; Future  -     CBC with Auto Differential; Future  3. Type 2 diabetes mellitus with diabetic nephropathy, unspecified whether long term insulin use (HCC)  -     Hemoglobin A1C; Future  4. Anxiety  Assessment & Plan:  Stable on meds  Xanax TID and PRN- unable to stop this  No concern for diversion  Orders:  -     ALPRAZolam (XANAX) 1 MG tablet; 3 times daily and as needed (MAX 4 per day), Disp-120 tablet, R-2Normal  5. BPH without obstruction/lower urinary tract symptoms  -     PSA, Prostatic Specific Antigen; Future  6. Idiopathic chronic gout of multiple sites without tophus  -     Uric Acid; Future    CPE in 3 months  Labs fasting prior to appt  No follow-ups on file.     SUBJECTIVE/OBJECTIVE:  Here for follow up  Doing well  Needs labs drawn    Current Outpatient Medications   Medication Sig Dispense Refill    ALPRAZolam (XANAX) 1 MG tablet 3 times daily and as needed (MAX 4 per day) 120 tablet 2    Handicap Placard MISC by Does not apply route X5 years    Dispense 2 placards 1 each 0    TRUEplus Lancets 33G MISC USE three to  FOUR TIMES A  each 5    blood glucose test strips (TRUE METRIX BLOOD GLUCOSE TEST) strip USE TO TEST 3-4 TIMES DAILY AS NEEDED 400 strip 5    Handicap Placard MISC by Does not apply route Lifetime- 1 each 0    ULORIC 40 MG TABS tablet TAKE ONE TABLET BY MOUTH DAILY 30 tablet 5    atorvastatin (LIPITOR) 20 MG tablet Take 1 tablet by mouth daily 90 tablet 3    vitamin D3 (CHOLECALCIFEROL) 10 MCG (400 UNIT) TABS tablet Take 1 tablet by mouth daily      Ascorbic Acid (VITAMIN C) 250 MG tablet Take 1 tablet by mouth daily

## 2023-06-21 NOTE — ASSESSMENT & PLAN NOTE
Lab Results   Component Value Date    LABA1C 7.4 04/13/2023    LABA1C 6.4 10/13/2022    LABA1C 6.4 03/17/2022    LABA1C 6.8 08/23/2021    LABA1C 6.4 02/04/2021   Rec cont to decrease carbs and cont to drop weight  He stopped Metformin d/t renal concerns, anxious about any meds that require renal clearance  F/U 3 months

## 2023-08-03 ENCOUNTER — OFFICE VISIT (OUTPATIENT)
Dept: ENDOCRINOLOGY | Age: 68
End: 2023-08-03
Payer: COMMERCIAL

## 2023-08-03 VITALS
RESPIRATION RATE: 15 BRPM | DIASTOLIC BLOOD PRESSURE: 82 MMHG | SYSTOLIC BLOOD PRESSURE: 138 MMHG | HEIGHT: 71 IN | TEMPERATURE: 97 F | OXYGEN SATURATION: 98 % | HEART RATE: 78 BPM | WEIGHT: 200.2 LBS | BODY MASS INDEX: 28.03 KG/M2

## 2023-08-03 DIAGNOSIS — E11.8 CONTROLLED TYPE 2 DIABETES MELLITUS WITH COMPLICATION, WITHOUT LONG-TERM CURRENT USE OF INSULIN (HCC): Primary | ICD-10-CM

## 2023-08-03 DIAGNOSIS — I10 PRIMARY HYPERTENSION: ICD-10-CM

## 2023-08-03 LAB — HBA1C MFR BLD: 7.3 %

## 2023-08-03 PROCEDURE — 3017F COLORECTAL CA SCREEN DOC REV: CPT | Performed by: INTERNAL MEDICINE

## 2023-08-03 PROCEDURE — 99214 OFFICE O/P EST MOD 30 MIN: CPT | Performed by: INTERNAL MEDICINE

## 2023-08-03 PROCEDURE — 1123F ACP DISCUSS/DSCN MKR DOCD: CPT | Performed by: INTERNAL MEDICINE

## 2023-08-03 PROCEDURE — 3051F HG A1C>EQUAL 7.0%<8.0%: CPT | Performed by: INTERNAL MEDICINE

## 2023-08-03 PROCEDURE — 3079F DIAST BP 80-89 MM HG: CPT | Performed by: INTERNAL MEDICINE

## 2023-08-03 PROCEDURE — G8417 CALC BMI ABV UP PARAM F/U: HCPCS | Performed by: INTERNAL MEDICINE

## 2023-08-03 PROCEDURE — 2022F DILAT RTA XM EVC RTNOPTHY: CPT | Performed by: INTERNAL MEDICINE

## 2023-08-03 PROCEDURE — 3075F SYST BP GE 130 - 139MM HG: CPT | Performed by: INTERNAL MEDICINE

## 2023-08-03 PROCEDURE — 1036F TOBACCO NON-USER: CPT | Performed by: INTERNAL MEDICINE

## 2023-08-03 PROCEDURE — G8427 DOCREV CUR MEDS BY ELIG CLIN: HCPCS | Performed by: INTERNAL MEDICINE

## 2023-08-03 PROCEDURE — 83037 HB GLYCOSYLATED A1C HOME DEV: CPT | Performed by: INTERNAL MEDICINE

## 2023-08-03 RX ORDER — CALCIUM CITRATE/VITAMIN D3 200MG-6.25
TABLET ORAL
Qty: 400 STRIP | Refills: 5 | Status: SHIPPED | OUTPATIENT
Start: 2023-08-03

## 2023-08-03 RX ORDER — GLUCOSAM/CHON-MSM1/C/MANG/BOSW 500-416.6
TABLET ORAL
Qty: 400 EACH | Refills: 5 | Status: SHIPPED | OUTPATIENT
Start: 2023-08-03

## 2023-08-03 NOTE — PROGRESS NOTES
Seen as f/u patient for diabetes    Interim:    No issues  Weight gain    Reports issues with Lipitor, muscle aches    Diagnosed with Type 2 diabetes mellitus > 5 years  Known diabetic complications: Nephropathy( cause?)     Current diabetic medications   onglyza 5mg-  Off of it as BG improved  GLipizide - stopped as had hypoglycemia- BG 38    Mild, controlled    Off metformin, has CKD    Last A1c 7.3%<-----7.4%<--- 7%<------6.4%<--- 6.8%<-----<-----6.6%<------6.4%<------6.9%<---- 6.8%<-----6.4% <------6.6%<----6.5%<-----5.9 on 3/17<------6.1 on 12/16<------- 6.2% on 8/16 <------- 6.8 on 5/16 <--- 10.1<----- 7.5 on 8/15<--- 9.4 <---- 7.9    Prior visit with dietician: No  Current diet: on average, 3 meals per day  Started to restrict CHO  Current exercise: Walking  Current monitoring regimen: home blood tests 3-4  times daily     Has brought blood glucose log/meter yes  Home blood sugar records:  Any episodes of hypoglycemia? no    No Hx of CAD , PVD, CVA    Hyperlipidemia:stable, tolerating Zocor 20mg   on 1/13    LDL 72 on 9/17   on 12/18  LDL 57 on 12/19  lipitor 40mg  LDL 55 on 9/20   on 1/21   Now on lipitor 20mg    H/o crestor use-reports side effects    Repatha was denied per patient    Last eye exam: 10/22  Last foot exam: 12/19  Last microalbumin to creatinine ratio: 9/17   Cr 1.9 on 3/16---> 1.5 on 8/16---> 1.4 on 10/16--> 1.2 on 6/18--> 1.3---> 1.7    HTN: Stable, lisinopril 10mg- off it due to low BP    He has arthritis, he was inquiring about steroids and effect on DM    Past Medical History:   Diagnosis Date    Anxiety     HTN (hypertension)     Hyperlipemia     Panic disorder     Type II or unspecified type diabetes mellitus without mention of complication, not stated as uncontrolled     Umbilical hernia      Past Surgical History:   Procedure Laterality Date    CHOLECYSTECTOMY, LAPAROSCOPIC  09/16/2016    with gram    COLONOSCOPY N/A 2/24/2022    COLONOSCOPY WITH BIOPSY Spontaneous, unlabored and symmetrical 07-Mar-2017 22:30

## 2023-08-04 LAB
CREAT UR-MCNC: 130.6 MG/DL (ref 39–259)
MICROALBUMIN UR DL<=1MG/L-MCNC: <1.2 MG/DL
MICROALBUMIN/CREAT UR: NORMAL MG/G (ref 0–30)

## 2023-08-07 ENCOUNTER — TELEPHONE (OUTPATIENT)
Dept: ENDOCRINOLOGY | Age: 68
End: 2023-08-07

## 2023-08-08 NOTE — TELEPHONE ENCOUNTER
His urine test looked good.  We did not do the blood kidney test. PCP will do that usually at annual physical

## 2023-08-08 NOTE — TELEPHONE ENCOUNTER
Spoke to pt and his question is basically is his urine test results good? Also wanting to make sure his kidneys are okay?

## 2023-08-14 DIAGNOSIS — N40.0 BPH WITHOUT OBSTRUCTION/LOWER URINARY TRACT SYMPTOMS: ICD-10-CM

## 2023-08-14 DIAGNOSIS — I10 ESSENTIAL HYPERTENSION: ICD-10-CM

## 2023-08-14 DIAGNOSIS — E78.2 MIXED HYPERLIPIDEMIA: ICD-10-CM

## 2023-08-14 DIAGNOSIS — M1A.09X0 IDIOPATHIC CHRONIC GOUT OF MULTIPLE SITES WITHOUT TOPHUS: ICD-10-CM

## 2023-08-14 LAB
ALBUMIN SERPL-MCNC: 4.2 G/DL (ref 3.4–5)
ALBUMIN/GLOB SERPL: 1.6 {RATIO} (ref 1.1–2.2)
ALP SERPL-CCNC: 65 U/L (ref 40–129)
ALT SERPL-CCNC: 18 U/L (ref 10–40)
ANION GAP SERPL CALCULATED.3IONS-SCNC: 13 MMOL/L (ref 3–16)
AST SERPL-CCNC: 20 U/L (ref 15–37)
BASOPHILS # BLD: 0.1 K/UL (ref 0–0.2)
BASOPHILS NFR BLD: 0.8 %
BILIRUB SERPL-MCNC: 1 MG/DL (ref 0–1)
BUN SERPL-MCNC: 28 MG/DL (ref 7–20)
CALCIUM SERPL-MCNC: 9.1 MG/DL (ref 8.3–10.6)
CHLORIDE SERPL-SCNC: 106 MMOL/L (ref 99–110)
CHOLEST SERPL-MCNC: 117 MG/DL (ref 0–199)
CO2 SERPL-SCNC: 24 MMOL/L (ref 21–32)
CREAT SERPL-MCNC: 1.9 MG/DL (ref 0.8–1.3)
DEPRECATED RDW RBC AUTO: 12.6 % (ref 12.4–15.4)
EOSINOPHIL # BLD: 0.3 K/UL (ref 0–0.6)
EOSINOPHIL NFR BLD: 4.2 %
GFR SERPLBLD CREATININE-BSD FMLA CKD-EPI: 37 ML/MIN/{1.73_M2}
GLUCOSE SERPL-MCNC: 154 MG/DL (ref 70–99)
HCT VFR BLD AUTO: 39.9 % (ref 40.5–52.5)
HDLC SERPL-MCNC: 39 MG/DL (ref 40–60)
HGB BLD-MCNC: 13.8 G/DL (ref 13.5–17.5)
LDLC SERPL CALC-MCNC: 63 MG/DL
LYMPHOCYTES # BLD: 1.8 K/UL (ref 1–5.1)
LYMPHOCYTES NFR BLD: 27.6 %
MCH RBC QN AUTO: 32.2 PG (ref 26–34)
MCHC RBC AUTO-ENTMCNC: 34.6 G/DL (ref 31–36)
MCV RBC AUTO: 93.2 FL (ref 80–100)
MONOCYTES # BLD: 0.6 K/UL (ref 0–1.3)
MONOCYTES NFR BLD: 8.6 %
NEUTROPHILS # BLD: 3.9 K/UL (ref 1.7–7.7)
NEUTROPHILS NFR BLD: 58.8 %
PLATELET # BLD AUTO: 140 K/UL (ref 135–450)
PMV BLD AUTO: 9 FL (ref 5–10.5)
POTASSIUM SERPL-SCNC: 4.4 MMOL/L (ref 3.5–5.1)
PROT SERPL-MCNC: 6.8 G/DL (ref 6.4–8.2)
PSA SERPL DL<=0.01 NG/ML-MCNC: 1.31 NG/ML (ref 0–4)
RBC # BLD AUTO: 4.28 M/UL (ref 4.2–5.9)
SODIUM SERPL-SCNC: 143 MMOL/L (ref 136–145)
TRIGL SERPL-MCNC: 74 MG/DL (ref 0–150)
URATE SERPL-MCNC: 3 MG/DL (ref 3.5–7.2)
VLDLC SERPL CALC-MCNC: 15 MG/DL
WBC # BLD AUTO: 6.6 K/UL (ref 4–11)

## 2023-08-17 ENCOUNTER — TELEPHONE (OUTPATIENT)
Dept: ENDOCRINOLOGY | Age: 68
End: 2023-08-17

## 2023-08-17 NOTE — TELEPHONE ENCOUNTER
Pt called in again requesting a call back from Herlinda. I let him know unless an emergency we allow 24-48 hours to reach out and she is with pt and will call when she can.

## 2023-08-17 NOTE — TELEPHONE ENCOUNTER
Patient is stating that his family doctor keeps putting him on other medications and he feels like his family doctor is \"overriding\" Dr. Shilo Cardenas. Please call him [phone]    Patient is requesting Annabel Baig to call him back.

## 2023-08-18 NOTE — TELEPHONE ENCOUNTER
It looks like his PCP is wanting him to start Jardiance, figured I would get you input before I call him, thanks

## 2023-08-18 NOTE — TELEPHONE ENCOUNTER
Looks like PCP did lab work and kidney function was a little lower so they placed him on jardiance. Clemencia Oka is a diabetes medication but it is also used for kidney protection. In his case, it was not needed for blood glucose but more so for the kidneys.  He can discuss with PCP zora about kidney management or seeing a kidney specialist

## 2023-08-22 ENCOUNTER — TELEPHONE (OUTPATIENT)
Dept: FAMILY MEDICINE CLINIC | Age: 68
End: 2023-08-22

## 2023-08-22 NOTE — TELEPHONE ENCOUNTER
Pt would like to discuss Jardiance and lab results further with Estee Partida or someone clinical. Pt is unsure about the medication and has questions and concerns.

## 2023-08-23 NOTE — TELEPHONE ENCOUNTER
Spoke with pt. He is not going to take the Jardiance. He even called [facility] and they didn't even give him a 100% that it brent be good for his kidneys. The pharmacy also. He read up on it. He has an upcoming appt with Hemanth Evans. Amelia Erickson only has a 28-38% approval. The medication is only 5-11 years old. There isn't even a generic. He is too afraid that this is going to hurt his kidneys. he said he has a diabetic dr and he does not think he needs to be on it. His diabetic is part of [facility]. He has other questions about the labs that are SLIGHTLY out of range. Wants Phyllis to know that he needs to have the [facility]. Dr. Lucero Dorsey was getting it for him and the he will not take the generic version. Pt states that Dr. Lucero Dorsey was calling and getting the brand name for pt     He said that it was weird that he didn't get a call from Hemanth Evans about his labs. He is also questioning the flag he received that is marked as an A. He doesn't understand what that means. He is going to wait until his next appt with Dr. Nicki Muller. He said if needed Phyllis can call Dr. Nicki Muller to discuss.  States that he has waived all HIPPA     Script is at the pharmacy but he wants to speak with Hemanth Evans first.

## 2023-08-24 RX ORDER — FEBUXOSTAT 40 MG/1
TABLET ORAL
Qty: 30 TABLET | Refills: 5 | Status: SHIPPED | OUTPATIENT
Start: 2023-08-24

## 2023-09-21 ENCOUNTER — OFFICE VISIT (OUTPATIENT)
Dept: FAMILY MEDICINE CLINIC | Age: 68
End: 2023-09-21
Payer: COMMERCIAL

## 2023-09-21 VITALS
SYSTOLIC BLOOD PRESSURE: 130 MMHG | TEMPERATURE: 97.3 F | OXYGEN SATURATION: 95 % | WEIGHT: 201 LBS | BODY MASS INDEX: 28.43 KG/M2 | DIASTOLIC BLOOD PRESSURE: 72 MMHG | HEART RATE: 73 BPM

## 2023-09-21 DIAGNOSIS — F41.9 ANXIETY: ICD-10-CM

## 2023-09-21 DIAGNOSIS — E78.2 MIXED HYPERLIPIDEMIA: ICD-10-CM

## 2023-09-21 PROCEDURE — 3078F DIAST BP <80 MM HG: CPT | Performed by: NURSE PRACTITIONER

## 2023-09-21 PROCEDURE — 3075F SYST BP GE 130 - 139MM HG: CPT | Performed by: NURSE PRACTITIONER

## 2023-09-21 PROCEDURE — G8417 CALC BMI ABV UP PARAM F/U: HCPCS | Performed by: NURSE PRACTITIONER

## 2023-09-21 PROCEDURE — 99213 OFFICE O/P EST LOW 20 MIN: CPT | Performed by: NURSE PRACTITIONER

## 2023-09-21 PROCEDURE — 1123F ACP DISCUSS/DSCN MKR DOCD: CPT | Performed by: NURSE PRACTITIONER

## 2023-09-21 PROCEDURE — 3017F COLORECTAL CA SCREEN DOC REV: CPT | Performed by: NURSE PRACTITIONER

## 2023-09-21 PROCEDURE — 1036F TOBACCO NON-USER: CPT | Performed by: NURSE PRACTITIONER

## 2023-09-21 PROCEDURE — G8427 DOCREV CUR MEDS BY ELIG CLIN: HCPCS | Performed by: NURSE PRACTITIONER

## 2023-09-21 RX ORDER — ALPRAZOLAM 1 MG/1
TABLET ORAL
Qty: 120 TABLET | Refills: 2 | Status: SHIPPED | OUTPATIENT
Start: 2023-09-21 | End: 2023-10-21

## 2023-09-21 RX ORDER — ATORVASTATIN CALCIUM 20 MG/1
20 TABLET, FILM COATED ORAL DAILY
Qty: 90 TABLET | Refills: 3 | Status: SHIPPED | OUTPATIENT
Start: 2023-09-21

## 2023-09-21 NOTE — PROGRESS NOTES
Mouth/Throat:      Mouth: Mucous membranes are moist.   Eyes:      Pupils: Pupils are equal, round, and reactive to light. Cardiovascular:      Rate and Rhythm: Normal rate and regular rhythm. Pulmonary:      Effort: Pulmonary effort is normal.   Musculoskeletal:         General: Normal range of motion. Cervical back: Normal range of motion. Skin:     General: Skin is warm and dry. Neurological:      Mental Status: He is alert and oriented to person, place, and time. An electronic signature was used to authenticate this note.     --SHAI Valdovinos - CNP

## 2023-09-26 NOTE — TELEPHONE ENCOUNTER
Pt called today to say he has not heard back about his Test strips. He is out of his test strips and need them called in to [facility]/or take care of the Prior Auth right away. Start hydroxyzine as needed for insomnia.Discussed insomnia condition course.  Advised of first-line medications for this condition.  Also discussed sleep hygiene.  Information was given below.  Good sleep habits (sometimes referred to as sleep hygiene) can help you get a good nights sleep.    Some habits that can improve your sleep health:  -Be consistent. Go to bed at the same time each night and get up at the same time each morning, including on the weekends  -Make sure your bedroom is quiet, dark, relaxing, and at a comfortable temperature  -Remove electronic devices, such as TVs, computers, and smart phones, from the bedroom  -Avoid large meals, caffeine, and alcohol before bedtime  -Get some exercise. Being physically active during the day can help you fall asleep more easily at night.

## 2023-11-01 ENCOUNTER — NURSE ONLY (OUTPATIENT)
Dept: FAMILY MEDICINE CLINIC | Age: 68
End: 2023-11-01
Payer: COMMERCIAL

## 2023-11-01 DIAGNOSIS — Z23 NEED FOR INFLUENZA VACCINATION: Primary | ICD-10-CM

## 2023-12-07 ENCOUNTER — OFFICE VISIT (OUTPATIENT)
Dept: ENDOCRINOLOGY | Age: 68
End: 2023-12-07
Payer: COMMERCIAL

## 2023-12-07 VITALS
BODY MASS INDEX: 28.42 KG/M2 | WEIGHT: 203 LBS | OXYGEN SATURATION: 97 % | SYSTOLIC BLOOD PRESSURE: 128 MMHG | TEMPERATURE: 98 F | DIASTOLIC BLOOD PRESSURE: 78 MMHG | RESPIRATION RATE: 15 BRPM | HEART RATE: 78 BPM | HEIGHT: 71 IN

## 2023-12-07 DIAGNOSIS — E11.8 CONTROLLED TYPE 2 DIABETES MELLITUS WITH COMPLICATION, WITHOUT LONG-TERM CURRENT USE OF INSULIN (HCC): Primary | ICD-10-CM

## 2023-12-07 DIAGNOSIS — E78.49 OTHER HYPERLIPIDEMIA: ICD-10-CM

## 2023-12-07 LAB — HBA1C MFR BLD: 7.3 %

## 2023-12-07 PROCEDURE — 3074F SYST BP LT 130 MM HG: CPT | Performed by: INTERNAL MEDICINE

## 2023-12-07 PROCEDURE — G8484 FLU IMMUNIZE NO ADMIN: HCPCS | Performed by: INTERNAL MEDICINE

## 2023-12-07 PROCEDURE — 1123F ACP DISCUSS/DSCN MKR DOCD: CPT | Performed by: INTERNAL MEDICINE

## 2023-12-07 PROCEDURE — 1036F TOBACCO NON-USER: CPT | Performed by: INTERNAL MEDICINE

## 2023-12-07 PROCEDURE — G8417 CALC BMI ABV UP PARAM F/U: HCPCS | Performed by: INTERNAL MEDICINE

## 2023-12-07 PROCEDURE — 2022F DILAT RTA XM EVC RTNOPTHY: CPT | Performed by: INTERNAL MEDICINE

## 2023-12-07 PROCEDURE — 3017F COLORECTAL CA SCREEN DOC REV: CPT | Performed by: INTERNAL MEDICINE

## 2023-12-07 PROCEDURE — 99214 OFFICE O/P EST MOD 30 MIN: CPT | Performed by: INTERNAL MEDICINE

## 2023-12-07 PROCEDURE — 83036 HEMOGLOBIN GLYCOSYLATED A1C: CPT | Performed by: INTERNAL MEDICINE

## 2023-12-07 PROCEDURE — G8427 DOCREV CUR MEDS BY ELIG CLIN: HCPCS | Performed by: INTERNAL MEDICINE

## 2023-12-07 PROCEDURE — 3078F DIAST BP <80 MM HG: CPT | Performed by: INTERNAL MEDICINE

## 2023-12-07 PROCEDURE — 3051F HG A1C>EQUAL 7.0%<8.0%: CPT | Performed by: INTERNAL MEDICINE

## 2023-12-07 RX ORDER — CALCIUM CITRATE/VITAMIN D3 200MG-6.25
TABLET ORAL
Qty: 400 STRIP | Refills: 5 | Status: SHIPPED | OUTPATIENT
Start: 2023-12-07

## 2024-01-02 ENCOUNTER — TELEPHONE (OUTPATIENT)
Dept: FAMILY MEDICINE CLINIC | Age: 69
End: 2024-01-02

## 2024-01-02 RX ORDER — FEBUXOSTAT 40 MG/1
TABLET ORAL
Qty: 30 TABLET | Refills: 5 | Status: SHIPPED | OUTPATIENT
Start: 2024-01-02 | End: 2024-01-04 | Stop reason: SDUPTHER

## 2024-01-02 NOTE — TELEPHONE ENCOUNTER
PT IS ASKING FOR PHYLLIS TO SEND ANOTHER RX TO THE PHARMACY BECAUSE THEY ARE SAYING HE DOESN'T HAVE ANY REFILLS AND HE NEEDS A PA SENT AS WELL BECAUSE ONLY HAS 1 PILL LEFT AND HE CAN'T GO WITHOUT THIS MEDICATION. BRAND NAME ONLY       ULORIC 40 MG TABS tablet [[id number]]    Order Details  Dose, Route, Frequency: As Directed   Dispense Quantity: 30 tablet Refills: 5          Sig: TAKE ONE TABLET BY MOUTH DAILY         Start Date: 08/24/23 End Date: --   Written Date: 08/24/23 Expiration Date: 08/23/24   Original Order: ULORIC 40 MG TABS tablet [[id number]]   Providers    Authorizing Provider: Phyllis Michaels APRN - CNP NPI: [id number]   Ordering User: Phyllis Michaels APRN - CNP          Pharmacy    [facility] [id number] [place] [address] [place]  - P [phone] - F [phone]  [address] [place] [address]  Phone: [phone]  Fax: [phone]

## 2024-01-03 ENCOUNTER — TELEPHONE (OUTPATIENT)
Dept: FAMILY MEDICINE CLINIC | Age: 69
End: 2024-01-03

## 2024-01-03 NOTE — TELEPHONE ENCOUNTER
Submitted PA for Uloric 40MG tablets (Brand)  [facility]ia [employer] Key: [id number]  STATUS: PENDING.    Follow up done daily; if no decision with in three days we will refax.  If another three days goes by with no decision will call the insurance for status.

## 2024-01-04 RX ORDER — FEBUXOSTAT 40 MG/1
TABLET ORAL
Qty: 30 TABLET | Refills: 5 | Status: SHIPPED | OUTPATIENT
Start: 2024-01-04

## 2024-01-04 NOTE — TELEPHONE ENCOUNTER
APPROVAL for Uloric 40MG tablets (Brand) 01/03/24-01/01/25; letter attached.    If this requires a response please respond to the pool ( P [facility] MEDICATION PRE-AUTH).      Thank you please advise patient.

## 2024-01-16 PROCEDURE — 90694 VACC AIIV4 NO PRSRV 0.5ML IM: CPT | Performed by: FAMILY MEDICINE

## 2024-01-16 PROCEDURE — 90471 IMMUNIZATION ADMIN: CPT | Performed by: FAMILY MEDICINE

## 2024-01-23 ENCOUNTER — TELEPHONE (OUTPATIENT)
Dept: ENDOCRINOLOGY | Age: 69
End: 2024-01-23

## 2024-01-23 RX ORDER — GLIPIZIDE 2.5 MG/1
2.5 TABLET, EXTENDED RELEASE ORAL DAILY
Qty: 30 TABLET | Refills: 3 | Status: SHIPPED | OUTPATIENT
Start: 2024-01-23 | End: 2024-01-25

## 2024-01-23 NOTE — TELEPHONE ENCOUNTER
Pt called in and said sugars have been running high and he needs this to be fixed he said. He said he has not been eating or leaving the house. Wants Solo to call him. He said he wants to eat regular foods with carbs and he wants a medicine to fix it.

## 2024-01-24 NOTE — TELEPHONE ENCOUNTER
Pt calling and states he never did get a call from the nurse to talk. Pt states Jardiance has side effects on your kidney. Pt did not know anything about the other medication being sent in for the Glipizide and would like to discuss    CB# [phone]

## 2024-01-24 NOTE — TELEPHONE ENCOUNTER
Spoke to pt..he will  and try taking the glipizide, does it have to be in the morning or would he be able to take it at night. Also wanting to know if it has to be with food because if his sugar is high then he isnt going to be eating anytime soon would it be okay to take it and not eat right away....

## 2024-01-25 ENCOUNTER — OFFICE VISIT (OUTPATIENT)
Dept: ENDOCRINOLOGY | Age: 69
End: 2024-01-25
Payer: COMMERCIAL

## 2024-01-25 VITALS
DIASTOLIC BLOOD PRESSURE: 78 MMHG | WEIGHT: 195 LBS | HEIGHT: 71 IN | SYSTOLIC BLOOD PRESSURE: 118 MMHG | HEART RATE: 81 BPM | OXYGEN SATURATION: 97 % | TEMPERATURE: 98 F | BODY MASS INDEX: 27.3 KG/M2 | RESPIRATION RATE: 14 BRPM

## 2024-01-25 DIAGNOSIS — E78.49 OTHER HYPERLIPIDEMIA: ICD-10-CM

## 2024-01-25 DIAGNOSIS — I10 PRIMARY HYPERTENSION: ICD-10-CM

## 2024-01-25 DIAGNOSIS — E11.8 CONTROLLED TYPE 2 DIABETES MELLITUS WITH COMPLICATION, WITHOUT LONG-TERM CURRENT USE OF INSULIN (HCC): Primary | ICD-10-CM

## 2024-01-25 PROCEDURE — 2022F DILAT RTA XM EVC RTNOPTHY: CPT | Performed by: INTERNAL MEDICINE

## 2024-01-25 PROCEDURE — 1036F TOBACCO NON-USER: CPT | Performed by: INTERNAL MEDICINE

## 2024-01-25 PROCEDURE — G8427 DOCREV CUR MEDS BY ELIG CLIN: HCPCS | Performed by: INTERNAL MEDICINE

## 2024-01-25 PROCEDURE — G8484 FLU IMMUNIZE NO ADMIN: HCPCS | Performed by: INTERNAL MEDICINE

## 2024-01-25 PROCEDURE — 3046F HEMOGLOBIN A1C LEVEL >9.0%: CPT | Performed by: INTERNAL MEDICINE

## 2024-01-25 PROCEDURE — 99214 OFFICE O/P EST MOD 30 MIN: CPT | Performed by: INTERNAL MEDICINE

## 2024-01-25 PROCEDURE — 3017F COLORECTAL CA SCREEN DOC REV: CPT | Performed by: INTERNAL MEDICINE

## 2024-01-25 PROCEDURE — G8417 CALC BMI ABV UP PARAM F/U: HCPCS | Performed by: INTERNAL MEDICINE

## 2024-01-25 PROCEDURE — 3074F SYST BP LT 130 MM HG: CPT | Performed by: INTERNAL MEDICINE

## 2024-01-25 PROCEDURE — 1123F ACP DISCUSS/DSCN MKR DOCD: CPT | Performed by: INTERNAL MEDICINE

## 2024-01-25 PROCEDURE — 3078F DIAST BP <80 MM HG: CPT | Performed by: INTERNAL MEDICINE

## 2024-01-25 NOTE — TELEPHONE ENCOUNTER
We can please have staff schedule a visit to discuss  I can see him at [place] on Wednesday at 12pm  Other option is VV  Oral options are limited as he would like to avoid other oral medications, we may need to start low dose insulin.

## 2024-01-25 NOTE — PROGRESS NOTES
intolerance, muscle aches. PCSK-9 inhibitor not covered per patient. He will be seeing cardiology  Takes Vit D 1000 IU , vit D 38, switched to D2, did not help  Can go back to D3  He is back on lipitor 20mg  LDL at goal  4.CKDIII: saw Nephrology but not following now  5.Gout  6.Panic Disorder  7.ED: Libido is good, nl testosterone, as he would like evaluated but concerned about side effects, discussed it may not help ED much, advised see urology      Plan:      May add humalog SSI   Advise to check blood sugar 1 times a day but he would like to monitor more frequently as helped controlled- recommend TID                                                               Patient to send blood sugar log for titration.   Advise to exercise regularly. Advise to low simple carbohydrate and protein with each  meal diet. 40gm of CHO meal per day   Diabetes Care: recommend yearly eye exam, foot exam and urine microalbumin to   creatinine ratio.     -Hyperlipidemia, LDL goal is <100 mg/dl   -Hypertension: Continue same  -Daily ASA: 81mg  -Smoking status: non smoker

## 2024-01-25 NOTE — TELEPHONE ENCOUNTER
Spoke to pt, he said this is the same thing you gave him back in 2016 that shot his numbers down so low he had to stop taking it immediately, I offered him a virtual to speak to you sooner, he refused

## 2024-01-25 NOTE — TELEPHONE ENCOUNTER
Pt refused vv and Abram. Pt is scheduled today at 4:40 [facility] to discuss options for his sugar

## 2024-01-27 LAB — FRUCTOSAMINE SERPL-SCNC: 347 UMOL/L (ref 205–285)

## 2024-03-12 ENCOUNTER — TELEPHONE (OUTPATIENT)
Dept: FAMILY MEDICINE CLINIC | Age: 69
End: 2024-03-12

## 2024-03-12 NOTE — TELEPHONE ENCOUNTER
Pt had a LAB/MA visit 11/01/2024 for his flu shot, but its documented he received in 01/16/2024. Pt did not even have OV that day. Please if possible find out what happened and contact pt after.

## 2024-03-20 ASSESSMENT — PATIENT HEALTH QUESTIONNAIRE - PHQ9
SUM OF ALL RESPONSES TO PHQ9 QUESTIONS 1 & 2: 0
SUM OF ALL RESPONSES TO PHQ9 QUESTIONS 1 & 2: 0
2. FEELING DOWN, DEPRESSED OR HOPELESS: NOT AT ALL
SUM OF ALL RESPONSES TO PHQ QUESTIONS 1-9: 0
1. LITTLE INTEREST OR PLEASURE IN DOING THINGS: NOT AT ALL
SUM OF ALL RESPONSES TO PHQ QUESTIONS 1-9: 0
1. LITTLE INTEREST OR PLEASURE IN DOING THINGS: NOT AT ALL
SUM OF ALL RESPONSES TO PHQ QUESTIONS 1-9: 0
2. FEELING DOWN, DEPRESSED OR HOPELESS: NOT AT ALL
SUM OF ALL RESPONSES TO PHQ QUESTIONS 1-9: 0

## 2024-03-21 ENCOUNTER — OFFICE VISIT (OUTPATIENT)
Dept: FAMILY MEDICINE CLINIC | Age: 69
End: 2024-03-21
Payer: COMMERCIAL

## 2024-03-21 VITALS
WEIGHT: 202 LBS | SYSTOLIC BLOOD PRESSURE: 144 MMHG | HEART RATE: 76 BPM | TEMPERATURE: 97.1 F | BODY MASS INDEX: 28.57 KG/M2 | OXYGEN SATURATION: 96 % | DIASTOLIC BLOOD PRESSURE: 80 MMHG

## 2024-03-21 DIAGNOSIS — E11.22 TYPE 2 DIABETES MELLITUS WITH CHRONIC KIDNEY DISEASE, WITHOUT LONG-TERM CURRENT USE OF INSULIN, UNSPECIFIED CKD STAGE (HCC): ICD-10-CM

## 2024-03-21 DIAGNOSIS — F41.9 ANXIETY: Primary | ICD-10-CM

## 2024-03-21 DIAGNOSIS — R03.0 ELEVATED BLOOD PRESSURE READING: ICD-10-CM

## 2024-03-21 PROCEDURE — 99214 OFFICE O/P EST MOD 30 MIN: CPT | Performed by: NURSE PRACTITIONER

## 2024-03-21 PROCEDURE — G8484 FLU IMMUNIZE NO ADMIN: HCPCS | Performed by: NURSE PRACTITIONER

## 2024-03-21 PROCEDURE — G8427 DOCREV CUR MEDS BY ELIG CLIN: HCPCS | Performed by: NURSE PRACTITIONER

## 2024-03-21 PROCEDURE — 3079F DIAST BP 80-89 MM HG: CPT | Performed by: NURSE PRACTITIONER

## 2024-03-21 PROCEDURE — 1123F ACP DISCUSS/DSCN MKR DOCD: CPT | Performed by: NURSE PRACTITIONER

## 2024-03-21 PROCEDURE — 3077F SYST BP >= 140 MM HG: CPT | Performed by: NURSE PRACTITIONER

## 2024-03-21 PROCEDURE — G8417 CALC BMI ABV UP PARAM F/U: HCPCS | Performed by: NURSE PRACTITIONER

## 2024-03-21 PROCEDURE — 3017F COLORECTAL CA SCREEN DOC REV: CPT | Performed by: NURSE PRACTITIONER

## 2024-03-21 PROCEDURE — 2022F DILAT RTA XM EVC RTNOPTHY: CPT | Performed by: NURSE PRACTITIONER

## 2024-03-21 PROCEDURE — 1036F TOBACCO NON-USER: CPT | Performed by: NURSE PRACTITIONER

## 2024-03-21 PROCEDURE — 3046F HEMOGLOBIN A1C LEVEL >9.0%: CPT | Performed by: NURSE PRACTITIONER

## 2024-03-21 RX ORDER — BLOOD PRESSURE TEST KIT
1 KIT MISCELLANEOUS DAILY
Qty: 1 KIT | Refills: 0 | Status: SHIPPED | OUTPATIENT
Start: 2024-03-21

## 2024-03-21 RX ORDER — ALPRAZOLAM 1 MG/1
TABLET ORAL
Qty: 100 TABLET | Refills: 2 | Status: SHIPPED | OUTPATIENT
Start: 2024-03-21 | End: 2024-06-21

## 2024-03-21 RX ORDER — ALPRAZOLAM 1 MG/1
TABLET ORAL
Qty: 100 TABLET | Refills: 0 | Status: SHIPPED | OUTPATIENT
Start: 2024-03-21 | End: 2024-03-21

## 2024-03-21 RX ORDER — ALPRAZOLAM 1 MG/1
TABLET ORAL
COMMUNITY
Start: 2024-02-18

## 2024-03-21 ASSESSMENT — ENCOUNTER SYMPTOMS
CONSTIPATION: 0
ABDOMINAL PAIN: 0
NAUSEA: 0
STRIDOR: 0
EYE REDNESS: 0
SINUS PRESSURE: 0
EYE DISCHARGE: 0
RHINORRHEA: 0
WHEEZING: 0
SHORTNESS OF BREATH: 0
SINUS PAIN: 0
BACK PAIN: 0
CHEST TIGHTNESS: 0
DIARRHEA: 0
VOMITING: 0

## 2024-03-21 NOTE — ASSESSMENT & PLAN NOTE
Blood pressure elevated to 140s/80s today. Blood pressure ordered. Patient advised to take blood pressure at the same time daily and keep a log. Call the office to follow up in 1 week.

## 2024-03-21 NOTE — PROGRESS NOTES
Boone Messina . (:  1955) is a 68 y.o. male,Established patient, here for evaluation of the following chief complaint(s):  Medication Refill      ASSESSMENT/PLAN:  1. Anxiety  Assessment & Plan:  Well- controlled, continue current medications   Orders:  -     ALPRAZolam (XANAX) 1 MG tablet; Take 1 tablet 3 times a day and as needed not to exceed 4 mg per day, Disp-100 tablet, R-0Normal  2. Type 2 diabetes mellitus with chronic kidney disease, without long-term current use of insulin, unspecified CKD stage (HCC)  Assessment & Plan:  Managed by a specialist. Continue current treatment plan.   3. Elevated blood pressure reading  Assessment & Plan:  Blood pressure elevated to 140s/80s today. Blood pressure ordered. Patient advised to take blood pressure at the same time daily and keep a log. Call the office to follow up in 1 week.   Orders:  -     Blood Pressure KIT; DAILY Starting Thu 3/21/2024, Disp-1 kit, R-0, Normal      No follow-ups on file.    SUBJECTIVE/OBJECTIVE:  Patient presents today for medication refills. States he sees endocrine in April related to his diabetes and will have A1c checked at that time.   Has occ abd pains r/t his ventral hernia when lifting, but resolves without issues when rested. Otherwise well    Current Outpatient Medications   Medication Sig Dispense Refill    ALPRAZolam (XANAX) 1 MG tablet       ALPRAZolam (XANAX) 1 MG tablet Take 1 tablet 3 times a day and as needed not to exceed 4 mg per day 100 tablet 0    Blood Pressure KIT 1 Device by Does not apply route daily 1 kit 0    ULORIC 40 MG TABS tablet TAKE ONE TABLET BY MOUTH DAILY 30 tablet 5    blood glucose test strips (TRUE METRIX BLOOD GLUCOSE TEST) strip USE TO TEST 3-4 TIMES DAILY AS NEEDED 400 strip 5    atorvastatin (LIPITOR) 20 MG tablet TAKE ONE TABLET BY MOUTH DAILY 90 tablet 3    TRUEplus Lancets 33G MISC USE three to  FOUR TIMES A  each 5    Handicap Placard MISC by Does not apply route X5

## 2024-03-26 ENCOUNTER — TELEPHONE (OUTPATIENT)
Dept: FAMILY MEDICINE CLINIC | Age: 69
End: 2024-03-26

## 2024-03-26 DIAGNOSIS — F41.9 ANXIETY: ICD-10-CM

## 2024-03-26 RX ORDER — ALPRAZOLAM 1 MG/1
TABLET ORAL
Qty: 120 TABLET | Refills: 2 | Status: SHIPPED | OUTPATIENT
Start: 2024-03-26 | End: 2024-06-26

## 2024-03-26 NOTE — TELEPHONE ENCOUNTER
Pt is upset because Phyllis sent the wrong qty of his XANAX 1 mg RX to the pharmacy. Please correct and re send it. It should be a QTY of 120 tabs, with 2 refills. He stated that she sent a qty of 100 tabs with 2 refills which is incorrect. History does show he normally gets qty of 120.   He is out of medication and very upset about it. If any questions please call the pt back, thx          ALPRAZolam (XANAX) 1 MG tablet [[id number]]    Order Details  Dose, Route, Frequency: As Directed   Dispense Quantity: 100 tablet Refills: 2          Sig: Take 1 tablet 3 times a day and as needed not to exceed 4 mg per day         Start Date: 03/21/24 End Date: 06/21/24   Written Date: 03/21/24 Expiration Date: 09/17/24       Associated Diagnoses: Anxiety [F41.9]   Original Order: ALPRAZolam (XANAX) 1 MG tablet [[id number]]   Providers    Authorizing Provider: Phyllis Michaels APRN - CNP NPI: [id number]   Ordering User: Phyllis Michaels APRN - CNP          Pharmacy    [facility] [id number] - P [phone] - F [phone]  [address] [place] [id number]  Phone: [phone]  Fax: [phone]

## 2024-04-23 ENCOUNTER — TELEPHONE (OUTPATIENT)
Dept: ENDOCRINOLOGY | Age: 69
End: 2024-04-23

## 2024-04-23 NOTE — TELEPHONE ENCOUNTER
Pt called back, he is concerned his numbers are running between 160-206, when he checks his sugar he is sticking multiple fingers and getting large differences in the numbers. He refuses to eat when his numbers are high, so he says he is going most of the day without eating. He is going to try and make it to the appt thurs, Any advice for him?

## 2024-04-23 NOTE — TELEPHONE ENCOUNTER
Checked with Tere, pt said he is worried about missing the appt and upsetting you, he said he is almost 69 and his A1c is 7 and he is afraid he is going to have a stroke..he wants something done about his blood sugars
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
## 2024-04-23 NOTE — TELEPHONE ENCOUNTER
He can keep his appt.  If misses, will order A1c.  Based on his last evaluation, he was okay and at goal.

## 2024-04-23 NOTE — TELEPHONE ENCOUNTER
Left message letting pt know to keep appt for now and if he is unable to make it we will order labs and follow up with him for any changes

## 2024-04-25 ENCOUNTER — OFFICE VISIT (OUTPATIENT)
Dept: ENDOCRINOLOGY | Age: 69
End: 2024-04-25
Payer: COMMERCIAL

## 2024-04-25 VITALS
RESPIRATION RATE: 16 BRPM | SYSTOLIC BLOOD PRESSURE: 130 MMHG | HEART RATE: 68 BPM | OXYGEN SATURATION: 96 % | DIASTOLIC BLOOD PRESSURE: 80 MMHG | WEIGHT: 199 LBS | HEIGHT: 71 IN | BODY MASS INDEX: 27.86 KG/M2

## 2024-04-25 DIAGNOSIS — E11.8 CONTROLLED TYPE 2 DIABETES MELLITUS WITH COMPLICATION, WITHOUT LONG-TERM CURRENT USE OF INSULIN (HCC): Primary | ICD-10-CM

## 2024-04-25 DIAGNOSIS — E78.49 OTHER HYPERLIPIDEMIA: ICD-10-CM

## 2024-04-25 LAB — HBA1C MFR BLD: 7 %

## 2024-04-25 PROCEDURE — G8417 CALC BMI ABV UP PARAM F/U: HCPCS | Performed by: INTERNAL MEDICINE

## 2024-04-25 PROCEDURE — 1036F TOBACCO NON-USER: CPT | Performed by: INTERNAL MEDICINE

## 2024-04-25 PROCEDURE — 2022F DILAT RTA XM EVC RTNOPTHY: CPT | Performed by: INTERNAL MEDICINE

## 2024-04-25 PROCEDURE — 1123F ACP DISCUSS/DSCN MKR DOCD: CPT | Performed by: INTERNAL MEDICINE

## 2024-04-25 PROCEDURE — 3017F COLORECTAL CA SCREEN DOC REV: CPT | Performed by: INTERNAL MEDICINE

## 2024-04-25 PROCEDURE — 3046F HEMOGLOBIN A1C LEVEL >9.0%: CPT | Performed by: INTERNAL MEDICINE

## 2024-04-25 PROCEDURE — G8427 DOCREV CUR MEDS BY ELIG CLIN: HCPCS | Performed by: INTERNAL MEDICINE

## 2024-04-25 PROCEDURE — 3079F DIAST BP 80-89 MM HG: CPT | Performed by: INTERNAL MEDICINE

## 2024-04-25 PROCEDURE — 83036 HEMOGLOBIN GLYCOSYLATED A1C: CPT | Performed by: INTERNAL MEDICINE

## 2024-04-25 PROCEDURE — 99214 OFFICE O/P EST MOD 30 MIN: CPT | Performed by: INTERNAL MEDICINE

## 2024-04-25 PROCEDURE — 3075F SYST BP GE 130 - 139MM HG: CPT | Performed by: INTERNAL MEDICINE

## 2024-04-25 RX ORDER — CALCIUM CITRATE/VITAMIN D3 200MG-6.25
TABLET ORAL
Qty: 400 STRIP | Refills: 5 | Status: SHIPPED | OUTPATIENT
Start: 2024-04-25

## 2024-04-25 NOTE — PROGRESS NOTES
Seen as f/u patient for diabetes    Interim:    High glucose  Wants to avoid glipizide due to lows in the past    He is on jardiance per PCP  He has Concern about taking it  States called company about jardiance    No issues  Weight gain    Reports issues with Lipitor, muscle aches    Diagnosed with Type 2 diabetes mellitus > 5 years  Known diabetic complications: Nephropathy( cause?)     Current diabetic medications   onglyza 5mg-  Off of it as BG improved  GLipizide - stopped as had hypoglycemia- BG 38    Mild, controlled    Off metformin, has CKD    Last A1c 7%<--- 7.3%<-----7.3%<-----7.4%<--- 7%<------6.4%<--- 6.8%<-----<-----6.6%<------6.4%<------6.9%<---- 6.8%<-----6.4% <------6.6%<----6.5%<-----5.9 on 3/17<------6.1 on 12/16<------- 6.2% on 8/16 <------- 6.8 on 5/16 <--- 10.1<----- 7.5 on 8/15<--- 9.4 <---- 7.9    Prior visit with dietician: No  Current diet: on average, 3 meals per day  Started to restrict CHO  Current exercise: Walking  Current monitoring regimen: home blood tests 3-4  times daily     Has brought blood glucose log/meter yes  Home blood sugar records:  99- 180    Any episodes of hypoglycemia? no    No Hx of CAD , PVD, CVA    Hyperlipidemia:stable, tolerating Zocor 20mg   on 1/13    LDL 72 on 9/17   on 12/18  LDL 57 on 12/19  lipitor 40mg  LDL 55 on 9/20   on 1/21   Now on lipitor 20mg  LDL 63 on 8/23    H/o crestor use-reports side effects    Repatha was denied per patient    Last eye exam: 10/22  Last foot exam: 12/19  Last microalbumin to creatinine ratio: 8/23   Cr 1.9 on 3/16---> 1.5 on 8/16---> 1.4 on 10/16--> 1.2 on 6/18--> 1.3---> 1.7---> 1.9    HTN: Stable, lisinopril 10mg- off it due to low BP    He has arthritis, he was inquiring about steroids and effect on DM    Past Medical History:   Diagnosis Date    Anxiety     HTN (hypertension)     Hyperlipemia     Panic disorder     Type II or unspecified type diabetes mellitus without mention of complication, not

## 2024-06-20 ENCOUNTER — OFFICE VISIT (OUTPATIENT)
Dept: FAMILY MEDICINE CLINIC | Age: 69
End: 2024-06-20
Payer: COMMERCIAL

## 2024-06-20 VITALS
WEIGHT: 201.6 LBS | BODY MASS INDEX: 28.22 KG/M2 | SYSTOLIC BLOOD PRESSURE: 120 MMHG | OXYGEN SATURATION: 96 % | DIASTOLIC BLOOD PRESSURE: 70 MMHG | TEMPERATURE: 97.7 F | HEIGHT: 71 IN | HEART RATE: 79 BPM

## 2024-06-20 DIAGNOSIS — F41.9 ANXIETY: ICD-10-CM

## 2024-06-20 DIAGNOSIS — M1A.09X0 IDIOPATHIC CHRONIC GOUT OF MULTIPLE SITES WITHOUT TOPHUS: ICD-10-CM

## 2024-06-20 DIAGNOSIS — N18.32 STAGE 3B CHRONIC KIDNEY DISEASE (HCC): ICD-10-CM

## 2024-06-20 DIAGNOSIS — N40.1 BENIGN PROSTATIC HYPERPLASIA WITH URINARY FREQUENCY: ICD-10-CM

## 2024-06-20 DIAGNOSIS — R35.0 BENIGN PROSTATIC HYPERPLASIA WITH URINARY FREQUENCY: ICD-10-CM

## 2024-06-20 DIAGNOSIS — E11.22 TYPE 2 DIABETES MELLITUS WITH CHRONIC KIDNEY DISEASE, WITHOUT LONG-TERM CURRENT USE OF INSULIN, UNSPECIFIED CKD STAGE (HCC): Primary | ICD-10-CM

## 2024-06-20 DIAGNOSIS — E78.2 MIXED HYPERLIPIDEMIA: ICD-10-CM

## 2024-06-20 DIAGNOSIS — I10 ESSENTIAL HYPERTENSION: ICD-10-CM

## 2024-06-20 PROCEDURE — 1123F ACP DISCUSS/DSCN MKR DOCD: CPT | Performed by: NURSE PRACTITIONER

## 2024-06-20 PROCEDURE — 3078F DIAST BP <80 MM HG: CPT | Performed by: NURSE PRACTITIONER

## 2024-06-20 PROCEDURE — 3051F HG A1C>EQUAL 7.0%<8.0%: CPT | Performed by: NURSE PRACTITIONER

## 2024-06-20 PROCEDURE — 3074F SYST BP LT 130 MM HG: CPT | Performed by: NURSE PRACTITIONER

## 2024-06-20 PROCEDURE — G8417 CALC BMI ABV UP PARAM F/U: HCPCS | Performed by: NURSE PRACTITIONER

## 2024-06-20 PROCEDURE — 2022F DILAT RTA XM EVC RTNOPTHY: CPT | Performed by: NURSE PRACTITIONER

## 2024-06-20 PROCEDURE — G2211 COMPLEX E/M VISIT ADD ON: HCPCS | Performed by: NURSE PRACTITIONER

## 2024-06-20 PROCEDURE — 99215 OFFICE O/P EST HI 40 MIN: CPT | Performed by: NURSE PRACTITIONER

## 2024-06-20 PROCEDURE — 3017F COLORECTAL CA SCREEN DOC REV: CPT | Performed by: NURSE PRACTITIONER

## 2024-06-20 PROCEDURE — 1036F TOBACCO NON-USER: CPT | Performed by: NURSE PRACTITIONER

## 2024-06-20 PROCEDURE — G8427 DOCREV CUR MEDS BY ELIG CLIN: HCPCS | Performed by: NURSE PRACTITIONER

## 2024-06-20 RX ORDER — ALPRAZOLAM 1 MG/1
TABLET ORAL
Qty: 120 TABLET | Refills: 2 | Status: SHIPPED | OUTPATIENT
Start: 2024-06-20 | End: 2024-09-20

## 2024-06-20 RX ORDER — FEBUXOSTAT 40 MG/1
TABLET ORAL
Qty: 90 TABLET | Refills: 3 | Status: SHIPPED | OUTPATIENT
Start: 2024-06-20

## 2024-06-20 RX ORDER — TAMSULOSIN HYDROCHLORIDE 0.4 MG/1
0.4 CAPSULE ORAL DAILY
Qty: 30 CAPSULE | Refills: 5 | Status: SHIPPED | OUTPATIENT
Start: 2024-06-20

## 2024-06-20 SDOH — ECONOMIC STABILITY: INCOME INSECURITY: HOW HARD IS IT FOR YOU TO PAY FOR THE VERY BASICS LIKE FOOD, HOUSING, MEDICAL CARE, AND HEATING?: NOT HARD AT ALL

## 2024-06-20 SDOH — ECONOMIC STABILITY: FOOD INSECURITY: WITHIN THE PAST 12 MONTHS, YOU WORRIED THAT YOUR FOOD WOULD RUN OUT BEFORE YOU GOT MONEY TO BUY MORE.: NEVER TRUE

## 2024-06-20 SDOH — ECONOMIC STABILITY: FOOD INSECURITY: WITHIN THE PAST 12 MONTHS, THE FOOD YOU BOUGHT JUST DIDN'T LAST AND YOU DIDN'T HAVE MONEY TO GET MORE.: NEVER TRUE

## 2024-06-20 ASSESSMENT — ENCOUNTER SYMPTOMS
WHEEZING: 0
CONSTIPATION: 0
ABDOMINAL PAIN: 0
EYE PAIN: 0
SINUS PAIN: 0
RHINORRHEA: 0
CHEST TIGHTNESS: 0
SINUS PRESSURE: 0
BACK PAIN: 0
STRIDOR: 0
ABDOMINAL DISTENTION: 0
EYE ITCHING: 0
EYE REDNESS: 0
DIARRHEA: 0
EYE DISCHARGE: 0
SHORTNESS OF BREATH: 0
NAUSEA: 0
TROUBLE SWALLOWING: 0
COUGH: 0
VOMITING: 0

## 2024-06-20 NOTE — PROGRESS NOTES
Boone Messina Sr. (:  1955) is a 69 y.o. male,Established patient, here for evaluation of the following chief complaint(s):  3 Month Follow-Up      ASSESSMENT/PLAN:  1. Type 2 diabetes mellitus with chronic kidney disease, without long-term current use of insulin, unspecified CKD stage (HCC)  Assessment & Plan:  Lab Results   Component Value Date    LABA1C 7.0 2024    LABA1C 7.3 2023    LABA1C 7.3 2023    LABA1C 7.4 2023    LABA1C 6.4 10/13/2022     Chronic-well-controlled for age.  Patient does follow with endocrinology for this.  A1c improved over the last year.  Will recheck in 3 months.  Is very hesitant to take any medications for fear of renal damage.  2. Anxiety  Assessment & Plan:  Chronic-stable fairly well-controlled on Xanax daily.  Has been on this dose for many years.  Does not tolerate changes.  [place]AR[place] report is reviewed and consistent with use.  No concern for diversion or misuse of this medication.  Refills provided.  Orders:  -     ALPRAZolam (XANAX) 1 MG tablet; Take 1 tablet 3 times a day and as needed not to exceed 4 mg per day, Disp-120 tablet, R-2Normal  3. Stage 3b chronic kidney disease (HCC)  Assessment & Plan:     Lab Results   Component Value Date    LABGLOM 43 (A) 2024    LABGLOM 37 (A) 2023    LABGLOM 38 (A) 2022    LABGLOM 40 (A) 2021    LABGLOM 47 (A) 2021     Chronic-stable.  No change in medications at this time.  Encourage adequate hydration.  Orders:  -     CBC with Auto Differential; Future  -     Comprehensive Metabolic Panel; Future  4. Benign prostatic hyperplasia with urinary frequency  Assessment & Plan:  Lab Results   Component Value Date    PSA 1.20 2024     New problem-nocturia likely related to increased fluid intake and evening.  Patient encouraged to stop p.o. fluids at 7 PM.  Will start Proscar at 5 PM [facility]  Orders:  -     PSA, Prostatic Specific Antigen

## 2024-06-21 PROBLEM — N40.1 BENIGN PROSTATIC HYPERPLASIA WITH URINARY FREQUENCY: Status: ACTIVE | Noted: 2024-06-21

## 2024-06-21 PROBLEM — N40.0 BENIGN PROSTATIC HYPERPLASIA WITHOUT LOWER URINARY TRACT SYMPTOMS: Status: RESOLVED | Noted: 2022-03-17 | Resolved: 2024-06-21

## 2024-06-21 PROBLEM — R03.0 ELEVATED BLOOD PRESSURE READING: Status: RESOLVED | Noted: 2024-03-21 | Resolved: 2024-06-21

## 2024-06-21 PROBLEM — R35.0 BENIGN PROSTATIC HYPERPLASIA WITH URINARY FREQUENCY: Status: ACTIVE | Noted: 2024-06-21

## 2024-06-21 LAB
ALBUMIN SERPL-MCNC: 4.2 G/DL (ref 3.4–5)
ALBUMIN/GLOB SERPL: 1.6 {RATIO} (ref 1.1–2.2)
ALP SERPL-CCNC: 66 U/L (ref 40–129)
ALT SERPL-CCNC: 19 U/L (ref 10–40)
ANION GAP SERPL CALCULATED.3IONS-SCNC: 12 MMOL/L (ref 3–16)
AST SERPL-CCNC: 19 U/L (ref 15–37)
BASOPHILS # BLD: 0 K/UL (ref 0–0.2)
BASOPHILS NFR BLD: 0.4 %
BILIRUB SERPL-MCNC: 0.7 MG/DL (ref 0–1)
BUN SERPL-MCNC: 41 MG/DL (ref 7–20)
CALCIUM SERPL-MCNC: 9.4 MG/DL (ref 8.3–10.6)
CHLORIDE SERPL-SCNC: 107 MMOL/L (ref 99–110)
CHOLEST SERPL-MCNC: 122 MG/DL (ref 0–199)
CO2 SERPL-SCNC: 23 MMOL/L (ref 21–32)
CREAT SERPL-MCNC: 1.7 MG/DL (ref 0.8–1.3)
DEPRECATED RDW RBC AUTO: 12.5 % (ref 12.4–15.4)
EOSINOPHIL # BLD: 0.2 K/UL (ref 0–0.6)
EOSINOPHIL NFR BLD: 3.7 %
GFR SERPLBLD CREATININE-BSD FMLA CKD-EPI: 43 ML/MIN/{1.73_M2}
GLUCOSE SERPL-MCNC: 159 MG/DL (ref 70–99)
HCT VFR BLD AUTO: 38.8 % (ref 40.5–52.5)
HDLC SERPL-MCNC: 42 MG/DL (ref 40–60)
HGB BLD-MCNC: 13 G/DL (ref 13.5–17.5)
LDLC SERPL CALC-MCNC: 64 MG/DL
LYMPHOCYTES # BLD: 1.9 K/UL (ref 1–5.1)
LYMPHOCYTES NFR BLD: 29.8 %
MCH RBC QN AUTO: 31.6 PG (ref 26–34)
MCHC RBC AUTO-ENTMCNC: 33.5 G/DL (ref 31–36)
MCV RBC AUTO: 94.3 FL (ref 80–100)
MONOCYTES # BLD: 0.5 K/UL (ref 0–1.3)
MONOCYTES NFR BLD: 8 %
NEUTROPHILS # BLD: 3.7 K/UL (ref 1.7–7.7)
NEUTROPHILS NFR BLD: 58.1 %
PLATELET # BLD AUTO: 177 K/UL (ref 135–450)
PMV BLD AUTO: 8.5 FL (ref 5–10.5)
POTASSIUM SERPL-SCNC: 4.6 MMOL/L (ref 3.5–5.1)
PROT SERPL-MCNC: 6.9 G/DL (ref 6.4–8.2)
PSA SERPL DL<=0.01 NG/ML-MCNC: 1.2 NG/ML (ref 0–4)
RBC # BLD AUTO: 4.11 M/UL (ref 4.2–5.9)
SODIUM SERPL-SCNC: 142 MMOL/L (ref 136–145)
TRIGL SERPL-MCNC: 78 MG/DL (ref 0–150)
VLDLC SERPL CALC-MCNC: 16 MG/DL
WBC # BLD AUTO: 6.3 K/UL (ref 4–11)

## 2024-06-21 NOTE — ASSESSMENT & PLAN NOTE
Chronic-stable fairly well-controlled on Xanax daily.  Has been on this dose for many years.  Does not tolerate changes.  OARRS report is reviewed and consistent with use.  No concern for diversion or misuse of this medication.  Refills provided.

## 2024-06-21 NOTE — ASSESSMENT & PLAN NOTE
Lab Results   Component Value Date    LABA1C 7.0 04/25/2024    LABA1C 7.3 12/07/2023    LABA1C 7.3 08/03/2023    LABA1C 7.4 04/13/2023    LABA1C 6.4 10/13/2022     Chronic-well-controlled for age.  Patient does follow with endocrinology for this.  A1c improved over the last year.  Will recheck in 3 months.  Is very hesitant to take any medications for fear of renal damage.

## 2024-06-21 NOTE — ASSESSMENT & PLAN NOTE
Lab Results   Component Value Date    PSA 1.20 06/20/2024     New problem-nocturia likely related to increased fluid intake and evening.  Patient encouraged to stop p.o. fluids at 7 PM.  Will start Proscar at 5 PM follow-up with results by My[facility] in 2 weeks.

## 2024-06-21 NOTE — ASSESSMENT & PLAN NOTE
Lab Results   Component Value Date    URICACID 3.0 (L) 08/14/2023     Chronic-stable and well-controlled.  Patient to continue on Uloric daily.  Sees nephrology for this.

## 2024-06-21 NOTE — ASSESSMENT & PLAN NOTE
Lab Results   Component Value Date    LABGLOM 43 (A) 06/20/2024    LABGLOM 37 (A) 08/14/2023    LABGLOM 38 (A) 08/12/2022    LABGLOM 40 (A) 09/02/2021    LABGLOM 47 (A) 04/22/2021     Chronic-stable.  No change in medications at this time.  Encourage adequate hydration.

## 2024-06-21 NOTE — ASSESSMENT & PLAN NOTE
Lab Results   Component Value Date    CHOL 122 06/20/2024    CHOL 117 08/14/2023    CHOL 103 08/12/2022     Lab Results   Component Value Date    TRIG 78 06/20/2024    TRIG 74 08/14/2023    TRIG 48 08/12/2022     Lab Results   Component Value Date    HDL 42 06/20/2024    HDL 39 (L) 08/14/2023    HDL 46 08/12/2022     Lab Results   Component Value Date    LDL 64 06/20/2024    LDL 63 08/14/2023    LDL 47 08/12/2022     Lab Results   Component Value Date    VLDL 16 06/20/2024    VLDL 15 08/14/2023    VLDL 10 08/12/2022     Lab Results   Component Value Date    CHOLHDLRATIO 4.8 (H) 05/05/2011     Chronic-stable and well-controlled.  Patient to continue Lipitor 20 mg daily.  Tolerating this dose well without muscle cramps or side effects.

## 2024-07-25 ENCOUNTER — TELEPHONE (OUTPATIENT)
Dept: ENDOCRINOLOGY | Age: 69
End: 2024-07-25

## 2024-07-25 NOTE — TELEPHONE ENCOUNTER
PT called in to change appointment from 9/19/2024 to 11/21/2024 had another appointment.  He wants to discuss his numbers they are really high and he wants to discuss insulin the epi pen type not the other. [phone]

## 2024-07-26 NOTE — TELEPHONE ENCOUNTER
Patient called back in the afternoon. States he and Dr Sousa discussed starting insulin at last visit and that he should reach out to REJI Banda if he was interested in starting it. Informed patient that Solo is out of the office today and that she would reach out to him next week.

## 2024-07-29 DIAGNOSIS — E11.8 CONTROLLED TYPE 2 DIABETES MELLITUS WITH COMPLICATION, WITHOUT LONG-TERM CURRENT USE OF INSULIN (HCC): Primary | ICD-10-CM

## 2024-07-31 DIAGNOSIS — E11.8 CONTROLLED TYPE 2 DIABETES MELLITUS WITH COMPLICATION, WITHOUT LONG-TERM CURRENT USE OF INSULIN (HCC): Primary | ICD-10-CM

## 2024-08-06 ENCOUNTER — OFFICE VISIT (OUTPATIENT)
Dept: ENDOCRINOLOGY | Age: 69
End: 2024-08-06
Payer: COMMERCIAL

## 2024-08-06 VITALS
DIASTOLIC BLOOD PRESSURE: 76 MMHG | RESPIRATION RATE: 15 BRPM | HEIGHT: 70 IN | WEIGHT: 200 LBS | OXYGEN SATURATION: 96 % | BODY MASS INDEX: 28.63 KG/M2 | HEART RATE: 72 BPM | SYSTOLIC BLOOD PRESSURE: 134 MMHG

## 2024-08-06 DIAGNOSIS — E11.8 CONTROLLED TYPE 2 DIABETES MELLITUS WITH COMPLICATION, WITHOUT LONG-TERM CURRENT USE OF INSULIN (HCC): Primary | ICD-10-CM

## 2024-08-06 LAB — HBA1C MFR BLD: 7.3 %

## 2024-08-06 PROCEDURE — 3051F HG A1C>EQUAL 7.0%<8.0%: CPT | Performed by: INTERNAL MEDICINE

## 2024-08-06 PROCEDURE — 3017F COLORECTAL CA SCREEN DOC REV: CPT | Performed by: INTERNAL MEDICINE

## 2024-08-06 PROCEDURE — 99213 OFFICE O/P EST LOW 20 MIN: CPT | Performed by: INTERNAL MEDICINE

## 2024-08-06 PROCEDURE — 83036 HEMOGLOBIN GLYCOSYLATED A1C: CPT | Performed by: INTERNAL MEDICINE

## 2024-08-06 PROCEDURE — G8417 CALC BMI ABV UP PARAM F/U: HCPCS | Performed by: INTERNAL MEDICINE

## 2024-08-06 PROCEDURE — 1123F ACP DISCUSS/DSCN MKR DOCD: CPT | Performed by: INTERNAL MEDICINE

## 2024-08-06 PROCEDURE — 1036F TOBACCO NON-USER: CPT | Performed by: INTERNAL MEDICINE

## 2024-08-06 PROCEDURE — G8427 DOCREV CUR MEDS BY ELIG CLIN: HCPCS | Performed by: INTERNAL MEDICINE

## 2024-08-06 PROCEDURE — 2022F DILAT RTA XM EVC RTNOPTHY: CPT | Performed by: INTERNAL MEDICINE

## 2024-08-06 PROCEDURE — 3078F DIAST BP <80 MM HG: CPT | Performed by: INTERNAL MEDICINE

## 2024-08-06 PROCEDURE — 3075F SYST BP GE 130 - 139MM HG: CPT | Performed by: INTERNAL MEDICINE

## 2024-08-06 RX ORDER — CALCIUM CITRATE/VITAMIN D3 200MG-6.25
TABLET ORAL
Qty: 400 STRIP | Refills: 5 | Status: SHIPPED | OUTPATIENT
Start: 2024-08-06 | End: 2024-08-06 | Stop reason: SDUPTHER

## 2024-08-06 RX ORDER — CALCIUM CITRATE/VITAMIN D3 200MG-6.25
TABLET ORAL
Qty: 450 STRIP | Refills: 5 | Status: SHIPPED | OUTPATIENT
Start: 2024-08-06

## 2024-08-06 RX ORDER — INSULIN LISPRO 100 [IU]/ML
INJECTION, SOLUTION INTRAVENOUS; SUBCUTANEOUS
Qty: 1 ADJUSTABLE DOSE PRE-FILLED PEN SYRINGE | Refills: 3 | Status: SHIPPED | OUTPATIENT
Start: 2024-08-06

## 2024-08-06 RX ORDER — FLURBIPROFEN SODIUM 0.3 MG/ML
1 SOLUTION/ DROPS OPHTHALMIC DAILY
Qty: 100 EACH | Refills: 6 | Status: SHIPPED | OUTPATIENT
Start: 2024-08-06

## 2024-08-06 RX ORDER — GLUCOSAM/CHON-MSM1/C/MANG/BOSW 500-416.6
TABLET ORAL
Qty: 450 EACH | Refills: 5 | Status: SHIPPED | OUTPATIENT
Start: 2024-08-06

## 2024-08-06 NOTE — PROGRESS NOTES
Seen as f/u patient for diabetes    Interim:    High glucose  Wants to avoid glipizide due to lows in the past    He is on jardiance per PCP  He has Concern about taking it  States called company about jardiance    No issues  Weight gain    Reports issues with Lipitor, muscle aches    Diagnosed with Type 2 diabetes mellitus > 5 years  Known diabetic complications: Nephropathy( cause?)     Current diabetic medications   onglyza 5mg-  Off of it as BG improved  GLipizide - stopped as had hypoglycemia- BG 38    Mild, controlled    Off metformin, has CKD    Last A1c 7.3%<----- 7%<--- 7.3%<-----7.3%<-----7.4%<--- 7%<------6.4%<--- 6.8%<-----<-----6.6%<------6.4%<------6.9%<---- 6.8%<-----6.4% <------6.6%<----6.5%<-----5.9 on 3/17<------6.1 on 12/16<------- 6.2% on 8/16 <------- 6.8 on 5/16 <--- 10.1<----- 7.5 on 8/15<--- 9.4 <---- 7.9    Prior visit with dietician: No  Current diet: on average, 3 meals per day  Started to restrict CHO  Current exercise: Walking  Current monitoring regimen: home blood tests 3-4  times daily     Has brought blood glucose log/meter yes  Home blood sugar records:  99- 180    Any episodes of hypoglycemia? no    No Hx of CAD , PVD, CVA    Hyperlipidemia:stable, tolerating Zocor 20mg   on 1/13    LDL 72 on 9/17   on 12/18  LDL 57 on 12/19  lipitor 40mg  LDL 55 on 9/20   on 1/21   Now on lipitor 20mg  LDL 63 on 8/23    H/o crestor use-reports side effects    Repatha was denied per patient    Last eye exam: 10/22  Last foot exam: 12/19  Last microalbumin to creatinine ratio: 8/23   Cr 1.9 on 3/16---> 1.5 on 8/16---> 1.4 on 10/16--> 1.2 on 6/18--> 1.3---> 1.7---> 1.9    HTN: Stable, lisinopril 10mg- off it due to low BP    He has arthritis, he was inquiring about steroids and effect on DM    Past Medical History:   Diagnosis Date    Anxiety     HTN (hypertension)     Hyperlipemia     Panic disorder     Type II or unspecified type diabetes mellitus without mention of

## 2024-08-07 LAB
CREAT UR-MCNC: 74.5 MG/DL (ref 39–259)
MICROALBUMIN UR DL<=1MG/L-MCNC: <1.2 MG/DL
MICROALBUMIN/CREAT UR: NORMAL MG/G (ref 0–30)

## 2024-08-08 ENCOUNTER — TELEPHONE (OUTPATIENT)
Dept: FAMILY MEDICINE CLINIC | Age: 69
End: 2024-08-08

## 2024-08-08 NOTE — TELEPHONE ENCOUNTER
Sent patient a my chart message letting him know that Dr. Leyva said it's ok to be seen by anyone who is available.

## 2024-08-08 NOTE — TELEPHONE ENCOUNTER
Pt calling in complaining of a possible abdominal hernia. He says that near his belly button is a rock hard lump that is pushing out. He has had hernia repair surgery in the past in a different location. He says that this lump can make it difficult to walk sometimes. Pt is concerned and was looking to schedule with PCP. 's next available is in a couple weeks, pt was okay with this but only if  thought he was safe and okay to wait. Pt prefers late afternoons. Please advise.

## 2024-08-14 ENCOUNTER — TELEPHONE (OUTPATIENT)
Dept: ENDOCRINOLOGY | Age: 69
End: 2024-08-14

## 2024-08-14 NOTE — TELEPHONE ENCOUNTER
Patient called at 6:30 PM today complaining that his blood glucose is high up to 320.    I had a lengthy conversation with him regarding what happened.  He stated that his blood glucose [id number].  At 3 PM he ate his first meal today and took 2 units of Humalog.  He had a large meal because he was concerned about risk of hypoglycemia.  Patient stated that he has stiff skin and he noted that the needle was  bended after injection.  He called pharmacy, and was told that he most likely did not get insulin and was advised to give repeat injection.  Patient was upset and called us to clarify what he is supposed to do.  At 6:12 his blood glucose was 276 and 320.  Patient was not going to eat today.    Advised patient to make sure that he has adequate hydration.  If he is able to walk or move, I advised to do so.  Also advised to try relax because patient appeared to be stressed out from the situation.  Considering his bad experience with the first insulin injection, and the fact that he was not going to eat today, I advised him to not do any other insulin injections today.    I advised patient that I will send a message to Dr. Sousa regarding further instructions.  Patient stated understanding.

## 2024-08-15 DIAGNOSIS — E11.8 CONTROLLED TYPE 2 DIABETES MELLITUS WITH COMPLICATION, WITHOUT LONG-TERM CURRENT USE OF INSULIN (HCC): Primary | ICD-10-CM

## 2024-08-15 NOTE — TELEPHONE ENCOUNTER
Can we please schedule patient for insulin training also at the hospital as previously advised. He would need more detail training.   Also would advise to avoid a very high carb meal even with insulin

## 2024-08-15 NOTE — TELEPHONE ENCOUNTER
Will hold on insulin until repeat insulin training. Referred to [facility] Diabetes Medication management for close monitoring on insulin therapy.

## 2024-08-22 ENCOUNTER — PHARMACY VISIT (OUTPATIENT)
Dept: PHARMACY | Age: 69
End: 2024-08-22

## 2024-08-22 DIAGNOSIS — N18.32 STAGE 3B CHRONIC KIDNEY DISEASE (HCC): ICD-10-CM

## 2024-08-22 DIAGNOSIS — E11.8 DIABETES MELLITUS TYPE 2 WITH COMPLICATIONS (HCC): Primary | ICD-10-CM

## 2024-08-22 NOTE — PATIENT INSTRUCTIONS
Patient Instructions:    Take Humalog before meals according to the scale below:  Blood Sugar:                      Number of Units  Less than 150                                  0  151-200                                           2  201-250                                           3  251 or greater                                 4  Call us at [phone] or call Dr. Sousa if you are having blood sugars over 300 mg/dl or less than 70 mg/dl.  Write down your meals on the log provided.  Continue to log your blood sugars and log your insulin doses.

## 2024-08-27 NOTE — PROGRESS NOTES
[facility]  [facility]  Pharmacy  Diabetes Service    [address], [place] [address]  Phone: [phone]  Fax: [phone]    NAME: Boone Messina Sr.  MEDICAL RECORD NUMBER:  [id number]  AGE: 69 y.o.   GENDER: male  : 1955  EPISODE DATE:  2024       Boone Messina was referred to the Wellness Center by Matthew Sousa MD   for Diabetes services.   Special Instructions per the Referral Include: Patient Education      ICD-10-CM    1. Diabetes mellitus type 2 with complications (HCC)  E11.8       2. Stage 3b chronic kidney disease (HCC)  N18.32           Referral goals: No diabetes goals were included on the referral     If diabetes goals are not included on the referral, ADA guidelines will be used.     Medication adjustments or recommendations will follow ADA guidelines.     For patients with Type 2 diabetes, adjustments and recommendations will take into consideration ASCVD, indicators of high risk ASCVD, Heart Failure, and CKD. Adjustments and recommendations will also be made according but not limited to: efficacy, weight loss, minimizing hypoglycemia in high risk patients, patient preferences and cost.     Artemio Shook is a 69 y.o. here for the Diabetes Service for self-management education, medication review including over the counter medications and herbal products, overall wellbeing assessment, transition of care and any needed adjustments with updates and recommendations communicated to the referring physician.      Boone Messina appears well and in no acute distress. Comes with no ambulatory device assistance. Is here today with their spouse for diabetes management. Boone appears ready, willing and able to engage in self-management activities.     Pertinent findings:    Hyperglycemia Symptoms:   none    Hypoglycemia Symptoms:   none    Other general findings:  - Patient is newly started on mealtime insulin and needs education on  encounter.      [facility] Follow-up:  1 month    Electronically signed by Luz Malik RPH on 8/27/2024 at 11:14 AM     Billing Points:    Complex education (new pt, bridging instructions, specific dietary instruction) - 3 points  Adjust dosage and/or reconcile meds (fill pill box) >/= 11 medications - 6 points  MULTIPLE SYSTEM UNCOMPLICATED (including but not limited to: basic assessment uncomplicated plus one or more of the following; assessment requiring additional monitoring like additional labs, EKG, etc.; assessment of medication list with multiple changes and compliance; one abnormal assessment finding and/or voiced concern) - 4 points  Glucose pattern Log Review - 2 points      For Pharmacy Admin Tracking Only    Intervention Detail:   Total # of Interventions Recommended: 3  Total # of Interventions Accepted: 3  Time Spent (min): 60

## 2024-09-05 ENCOUNTER — OFFICE VISIT (OUTPATIENT)
Dept: FAMILY MEDICINE CLINIC | Age: 69
End: 2024-09-05
Payer: COMMERCIAL

## 2024-09-05 VITALS
SYSTOLIC BLOOD PRESSURE: 122 MMHG | DIASTOLIC BLOOD PRESSURE: 78 MMHG | WEIGHT: 200.2 LBS | HEART RATE: 75 BPM | BODY MASS INDEX: 28.73 KG/M2 | OXYGEN SATURATION: 97 %

## 2024-09-05 DIAGNOSIS — R10.84 GENERALIZED ABDOMINAL PAIN: Primary | ICD-10-CM

## 2024-09-05 DIAGNOSIS — F41.9 ANXIETY: ICD-10-CM

## 2024-09-05 DIAGNOSIS — N18.32 STAGE 3B CHRONIC KIDNEY DISEASE (HCC): ICD-10-CM

## 2024-09-05 DIAGNOSIS — E11.22 TYPE 2 DIABETES MELLITUS WITH CHRONIC KIDNEY DISEASE, WITHOUT LONG-TERM CURRENT USE OF INSULIN, UNSPECIFIED CKD STAGE (HCC): ICD-10-CM

## 2024-09-05 PROCEDURE — 3051F HG A1C>EQUAL 7.0%<8.0%: CPT | Performed by: FAMILY MEDICINE

## 2024-09-05 PROCEDURE — 1123F ACP DISCUSS/DSCN MKR DOCD: CPT | Performed by: FAMILY MEDICINE

## 2024-09-05 PROCEDURE — 3017F COLORECTAL CA SCREEN DOC REV: CPT | Performed by: FAMILY MEDICINE

## 2024-09-05 PROCEDURE — 1036F TOBACCO NON-USER: CPT | Performed by: FAMILY MEDICINE

## 2024-09-05 PROCEDURE — 2022F DILAT RTA XM EVC RTNOPTHY: CPT | Performed by: FAMILY MEDICINE

## 2024-09-05 PROCEDURE — G8427 DOCREV CUR MEDS BY ELIG CLIN: HCPCS | Performed by: FAMILY MEDICINE

## 2024-09-05 PROCEDURE — G8417 CALC BMI ABV UP PARAM F/U: HCPCS | Performed by: FAMILY MEDICINE

## 2024-09-05 PROCEDURE — 3078F DIAST BP <80 MM HG: CPT | Performed by: FAMILY MEDICINE

## 2024-09-05 PROCEDURE — 3074F SYST BP LT 130 MM HG: CPT | Performed by: FAMILY MEDICINE

## 2024-09-05 PROCEDURE — 99214 OFFICE O/P EST MOD 30 MIN: CPT | Performed by: FAMILY MEDICINE

## 2024-09-05 ASSESSMENT — ENCOUNTER SYMPTOMS
DIARRHEA: 0
ABDOMINAL PAIN: 1
NAUSEA: 0
FLATUS: 0
CONSTIPATION: 0
HEMATOCHEZIA: 0
BELCHING: 0
VOMITING: 0

## 2024-09-05 ASSESSMENT — CROHNS DISEASE ACTIVITY INDEX (CDAI): CDAI SCORE: 0

## 2024-09-05 NOTE — ASSESSMENT & PLAN NOTE
Chronic, at goal (stable), continue current treatment plan  Controlled Substance Monitoring:    Acute and Chronic Pain Monitoring:   RX Monitoring Periodic Controlled Substance Monitoring Chronic Pain > 50 MEDD   9/5/2024   2:40 PM Possible medication side effects, risk of tolerance/dependence & alternative treatments discussed.;No signs of potential drug abuse or diversion identified.;Assessed functional status (ability to engage in work or other purposeful activities, the pain intensity and its interference with activities of daily living, quality of family life and social activities, and the physical activity) Obtained or confirmed \"Consent for Opioid Use\" on file.

## 2024-09-16 ENCOUNTER — HOSPITAL ENCOUNTER (OUTPATIENT)
Dept: CT IMAGING | Age: 69
Discharge: HOME | End: 2024-09-16
Payer: COMMERCIAL

## 2024-09-16 DIAGNOSIS — R10.84 GENERALIZED ABDOMINAL PAIN: ICD-10-CM

## 2024-09-16 PROCEDURE — 74176 CT ABD & PELVIS W/O CONTRAST: CPT

## 2024-09-19 ENCOUNTER — OFFICE VISIT (OUTPATIENT)
Dept: FAMILY MEDICINE CLINIC | Age: 69
End: 2024-09-19
Payer: COMMERCIAL

## 2024-09-19 VITALS
WEIGHT: 200.4 LBS | TEMPERATURE: 97.3 F | HEIGHT: 70 IN | BODY MASS INDEX: 28.69 KG/M2 | OXYGEN SATURATION: 95 % | HEART RATE: 83 BPM | DIASTOLIC BLOOD PRESSURE: 82 MMHG | SYSTOLIC BLOOD PRESSURE: 126 MMHG

## 2024-09-19 DIAGNOSIS — F41.9 ANXIETY: ICD-10-CM

## 2024-09-19 DIAGNOSIS — E78.2 MIXED HYPERLIPIDEMIA: ICD-10-CM

## 2024-09-19 DIAGNOSIS — M17.10 ARTHRITIS OF KNEE: Primary | ICD-10-CM

## 2024-09-19 PROCEDURE — G8427 DOCREV CUR MEDS BY ELIG CLIN: HCPCS | Performed by: NURSE PRACTITIONER

## 2024-09-19 PROCEDURE — 1123F ACP DISCUSS/DSCN MKR DOCD: CPT | Performed by: NURSE PRACTITIONER

## 2024-09-19 PROCEDURE — 3079F DIAST BP 80-89 MM HG: CPT | Performed by: NURSE PRACTITIONER

## 2024-09-19 PROCEDURE — 1036F TOBACCO NON-USER: CPT | Performed by: NURSE PRACTITIONER

## 2024-09-19 PROCEDURE — 3017F COLORECTAL CA SCREEN DOC REV: CPT | Performed by: NURSE PRACTITIONER

## 2024-09-19 PROCEDURE — 99214 OFFICE O/P EST MOD 30 MIN: CPT | Performed by: NURSE PRACTITIONER

## 2024-09-19 PROCEDURE — G8417 CALC BMI ABV UP PARAM F/U: HCPCS | Performed by: NURSE PRACTITIONER

## 2024-09-19 PROCEDURE — 3074F SYST BP LT 130 MM HG: CPT | Performed by: NURSE PRACTITIONER

## 2024-09-19 RX ORDER — ALPRAZOLAM 1 MG
TABLET ORAL
Qty: 120 TABLET | Refills: 2 | Status: SHIPPED | OUTPATIENT
Start: 2024-09-19 | End: 2024-12-20

## 2024-09-19 RX ORDER — ATORVASTATIN CALCIUM 20 MG/1
20 TABLET, FILM COATED ORAL DAILY
Qty: 90 TABLET | Refills: 3 | Status: SHIPPED | OUTPATIENT
Start: 2024-09-19

## 2024-10-10 SDOH — HEALTH STABILITY: PHYSICAL HEALTH: ON AVERAGE, HOW MANY DAYS PER WEEK DO YOU ENGAGE IN MODERATE TO STRENUOUS EXERCISE (LIKE A BRISK WALK)?: 4 DAYS

## 2024-10-10 SDOH — HEALTH STABILITY: PHYSICAL HEALTH: ON AVERAGE, HOW MANY MINUTES DO YOU ENGAGE IN EXERCISE AT THIS LEVEL?: 30 MIN

## 2024-10-11 ENCOUNTER — OFFICE VISIT (OUTPATIENT)
Dept: ORTHOPEDIC SURGERY | Age: 69
End: 2024-10-11
Payer: COMMERCIAL

## 2024-10-11 VITALS — BODY MASS INDEX: 28.63 KG/M2 | WEIGHT: 200 LBS | HEIGHT: 70 IN

## 2024-10-11 DIAGNOSIS — M25.562 PAIN IN BOTH KNEES, UNSPECIFIED CHRONICITY: Primary | ICD-10-CM

## 2024-10-11 DIAGNOSIS — M25.561 PAIN IN BOTH KNEES, UNSPECIFIED CHRONICITY: Primary | ICD-10-CM

## 2024-10-11 DIAGNOSIS — Z91.81 AT HIGH RISK FOR FALLS: ICD-10-CM

## 2024-10-11 PROCEDURE — G8417 CALC BMI ABV UP PARAM F/U: HCPCS | Performed by: ORTHOPAEDIC SURGERY

## 2024-10-11 PROCEDURE — 1036F TOBACCO NON-USER: CPT | Performed by: ORTHOPAEDIC SURGERY

## 2024-10-11 PROCEDURE — G8484 FLU IMMUNIZE NO ADMIN: HCPCS | Performed by: ORTHOPAEDIC SURGERY

## 2024-10-11 PROCEDURE — G8427 DOCREV CUR MEDS BY ELIG CLIN: HCPCS | Performed by: ORTHOPAEDIC SURGERY

## 2024-10-11 PROCEDURE — 1123F ACP DISCUSS/DSCN MKR DOCD: CPT | Performed by: ORTHOPAEDIC SURGERY

## 2024-10-11 PROCEDURE — 3017F COLORECTAL CA SCREEN DOC REV: CPT | Performed by: ORTHOPAEDIC SURGERY

## 2024-10-11 PROCEDURE — 99203 OFFICE O/P NEW LOW 30 MIN: CPT | Performed by: ORTHOPAEDIC SURGERY

## 2024-10-11 NOTE — PROGRESS NOTES
New Patient: SPINE    10/11/2024     CHIEF COMPLAINT:    Chief Complaint   Patient presents with    Knee Pain     N Bilateral Knee        HISTORY OF PRESENT ILLNESS:              The patient is a 69 y.o. male presents to the office today with a new problem.  Patient here with a chief complaint of bilateral lower extremity weakness.  He states his legs feel like \"noodles\".  He does not have any significant knee pain.  He is a diabetic and does feel he has neuropathy also.  He does complain of low back pain.  Past Medical History:   Diagnosis Date    Anxiety     HTN (hypertension)     Hyperlipemia     Panic disorder     Type II or unspecified type diabetes mellitus without mention of complication, not stated as uncontrolled     Umbilical hernia                The pain assessment was noted & reviewed in the medical record today.     Current/Past Treatment:   Physical Therapy:   Chiropractic:     Injection:     Medications:            NSAIDS:             Muscle relaxer:              Steriods:              Neuropathic medications:              Opioids:            Other:   Surgery/Consult:    Work Status/Functionality:     Past Medical History: Medical history form was reviewed today & scanned into the media tab  Past Medical History:   Diagnosis Date    Anxiety     HTN (hypertension)     Hyperlipemia     Panic disorder     Type II or unspecified type diabetes mellitus without mention of complication, not stated as uncontrolled     Umbilical hernia       Past Surgical History:     Past Surgical History:   Procedure Laterality Date    CHOLECYSTECTOMY, LAPAROSCOPIC  09/16/2016    with gram    COLONOSCOPY N/A 2/24/2022    COLONOSCOPY WITH BIOPSY performed by Silviano Huerta MD at [facility] ENDOSCOPY    NASAL POLYP SURGERY      SINUS SURGERY      TONSILLECTOMY      UMBILICAL HERNIA REPAIR  09/16/2016     Current Medications:     Current Outpatient Medications:     ALPRAZolam (XANAX) 1 MG tablet, Take 1 tablet 3 times a day and as

## 2024-10-23 NOTE — PLAN OF CARE
[facility]- Outpatient [facility] and Therapy [address], [place] [address] office: [phone] fax: [phone]     Physical Therapy Initial Evaluation Certification      Dear Woody Medeiros MD ,    We had the pleasure of evaluating the following patient for physical therapy services at [facility].  A summary of our findings can be found in the initial assessment below.  This includes our plan of care.  If you have any questions or concerns regarding these findings, please do not hesitate to contact me at the office phone number listed above.  Thank you for the referral.     Physician Signature:_______________________________Date:__________________  By signing above (or electronic signature), therapist’s plan is approved by physician       Physical Therapy: TREATMENT/PROGRESS NOTE   Patient: Boone Messina  (69 y.o. male)   Examination Date: 10/24/2024   :  1955 MRN: [id number]   Visit #:   Insurance Allowable Auth Needed   30 []Yes    [x]No    Insurance: Payor: [employer] / Plan: [employer] / Product Type: *No Product type* /   Insurance ID: [id number] - (Medicaid Managed)  Secondary Insurance (if applicable):    Treatment Diagnosis:     ICD-10-CM    1. Difficulty walking  R26.2       2. Decreased functional mobility  R26.89       3. Decreased functional activity tolerance  R68.89       4. Functional weakness  R53.1       5. Decreased ability to perform activities  R68.89       6. Need for home exercise program  Z78.9          Medical Diagnosis:  At high risk for falls [Z91.81]   Referring Physician: Woody Medeiros MD  PCP: Jeb Leyva MD     Plan of care signed (Y/N): routed    Date of Patient follow up with Physician:      Plan of Care Report: EVAL today  POC update due: (10 visits /OR AUTH LIMITS, whichever is less)  2024

## 2024-10-24 ENCOUNTER — HOSPITAL ENCOUNTER (OUTPATIENT)
Dept: PHYSICAL THERAPY | Age: 69
Setting detail: THERAPIES SERIES
Discharge: HOME | End: 2024-10-24
Payer: COMMERCIAL

## 2024-10-24 DIAGNOSIS — R68.89 DECREASED FUNCTIONAL ACTIVITY TOLERANCE: ICD-10-CM

## 2024-10-24 DIAGNOSIS — R26.2 DIFFICULTY WALKING: Primary | ICD-10-CM

## 2024-10-24 DIAGNOSIS — R68.89 DECREASED ABILITY TO PERFORM ACTIVITIES: ICD-10-CM

## 2024-10-24 DIAGNOSIS — R53.1 FUNCTIONAL WEAKNESS: ICD-10-CM

## 2024-10-24 DIAGNOSIS — R26.89 DECREASED FUNCTIONAL MOBILITY: ICD-10-CM

## 2024-10-24 DIAGNOSIS — Z78.9 NEED FOR HOME EXERCISE PROGRAM: ICD-10-CM

## 2024-10-24 PROCEDURE — 97110 THERAPEUTIC EXERCISES: CPT | Performed by: PHYSICAL THERAPIST

## 2024-10-24 PROCEDURE — 97161 PT EVAL LOW COMPLEX 20 MIN: CPT | Performed by: PHYSICAL THERAPIST

## 2024-10-24 PROCEDURE — 97530 THERAPEUTIC ACTIVITIES: CPT | Performed by: PHYSICAL THERAPIST

## 2024-10-29 ENCOUNTER — HOSPITAL ENCOUNTER (OUTPATIENT)
Dept: PHYSICAL THERAPY | Age: 69
Setting detail: THERAPIES SERIES
Discharge: HOME | End: 2024-10-29
Payer: COMMERCIAL

## 2024-10-29 PROCEDURE — 97140 MANUAL THERAPY 1/> REGIONS: CPT

## 2024-10-29 PROCEDURE — 97110 THERAPEUTIC EXERCISES: CPT

## 2024-10-29 NOTE — FLOWSHEET NOTE
No      CHARGE CAPTURE     PT CHARGE GRID   CPT Code (TIMED) minutes # CPT Code (UNTIMED) #     Therex (95077)  30 2  EVAL:LOW (66630 - Typically 20 minutes face-to-face)     Neuromusc. Re-ed (46529)    Re-Eval (55901)     Manual (41415) 10 1  Estim Unattended (65835)     Ther. Act (26096)    Mech. Traction (24826)     Gait (46435)    Dry Needle 1-2 muscle (75765)     Aquatic Therex (43671)    Dry Needle 3+ muscle (37233)     Iontophoresis (66149)    VASO (05251)     Ultrasound (70809)    Group Therapy (97644)     Estim Attended (51230)    Canalith Repositioning (28861)     Physical Performance Test (18103)         Other:    Other:    Total Timed Code Tx Minutes 40 3       Total Treatment Minutes 40        Charge Justification:  (72939) THERAPEUTIC EXERCISE - Provided verbal/tactile cueing for activities related to strengthening, flexibility, endurance, ROM performed to prevent loss of range of motion, maintain or improve muscular strength or increase flexibility, following either an injury or surgery.   (24572) HOME EXERCISE PROGRAM - Reviewed/Progressed HEP activities related to strengthening, flexibility, endurance, ROM performed to prevent loss of range of motion, maintain or improve muscular strength or increase flexibility, following either an injury or surgery.  (09607) MANUAL THERAPY -  Manual therapy techniques, 1 or more regions, each 15 minutes (Mobilization/manipulation, manual lymphatic drainage, manual traction) for the purpose of modulating pain, promoting relaxation,  increasing ROM, reducing/eliminating soft tissue swelling/inflammation/restriction, improving soft tissue extensibility and allowing for proper ROM for normal function with self care, mobility, lifting and ambulation    GOALS     Patient stated goal: \" I want to feel safer walking \"   [] Progressing: [] Met: [] Not Met: [] Adjusted    Therapist goals for Patient:   Short Term Goals: To be achieved in:4 weeks  1. Independent in HEP and

## 2024-11-01 ENCOUNTER — HOSPITAL ENCOUNTER (OUTPATIENT)
Dept: PHYSICAL THERAPY | Age: 69
Setting detail: THERAPIES SERIES
Discharge: HOME | End: 2024-11-01
Payer: COMMERCIAL

## 2024-11-01 PROCEDURE — 97110 THERAPEUTIC EXERCISES: CPT

## 2024-11-01 PROCEDURE — 97140 MANUAL THERAPY 1/> REGIONS: CPT

## 2024-11-01 NOTE — FLOWSHEET NOTE
[facility]- Outpatient Rehabilitation and Therapy [address], [place] [address] office: [phone] fax: [phone]     Physical Therapy Initial Evaluation Certification      Dear Woody Medeiros MD ,    We had the pleasure of evaluating the following patient for physical therapy services at [facility].  A summary of our findings can be found in the initial assessment below.  This includes our plan of care.  If you have any questions or concerns regarding these findings, please do not hesitate to contact me at the office phone number listed above.  Thank you for the referral.     Physician Signature:_______________________________Date:__________________  By signing above (or electronic signature), therapist’s plan is approved by physician       Physical Therapy: TREATMENT/PROGRESS NOTE   Patient: Boone Messina  (69 y.o. male)   Examination Date: 2024   :  1955 MRN: [id number]   Visit #: 3 /12  Insurance Allowable Auth Needed   30 []Yes    [x]No    Insurance: Payor: [employer] PLAN / Plan: [employer] PLAN / Product Type: *No Product type* /   Insurance ID: [id number] - (Medicaid Managed)  Secondary Insurance (if applicable):    Treatment Diagnosis:     ICD-10-CM    1. Difficulty walking  R26.2       2. Decreased functional mobility  R26.89       3. Decreased functional activity tolerance  R68.89       4. Functional weakness  R53.1       5. Decreased ability to perform activities  R68.89       6. Need for home exercise program  Z78.9          Medical Diagnosis:  At high risk for falls [Z91.81]   Referring Physician: Woody Medeiros MD  PCP: Jeb Leyva MD     Plan of care signed (Y/N): routed    Date of Patient follow up with Physician:      Plan of Care Report: EVAL today  POC update due: (10 visits /OR AUTH LIMITS, whichever is less)  2024

## 2024-11-05 ENCOUNTER — HOSPITAL ENCOUNTER (OUTPATIENT)
Dept: PHYSICAL THERAPY | Age: 69
Setting detail: THERAPIES SERIES
Discharge: HOME | End: 2024-11-05
Payer: COMMERCIAL

## 2024-11-05 PROCEDURE — 97140 MANUAL THERAPY 1/> REGIONS: CPT | Performed by: PHYSICAL THERAPIST

## 2024-11-05 PROCEDURE — 97110 THERAPEUTIC EXERCISES: CPT | Performed by: PHYSICAL THERAPIST

## 2024-11-05 NOTE — FLOWSHEET NOTE
[facility]- Outpatient Rehabilitation and Therapy [address], [place] [address] office: [phone] fax: [phone]     Physical Therapy Initial Evaluation Certification      Dear Woody Medeiros MD ,    We had the pleasure of evaluating the following patient for physical therapy services at [facility] Out[facility].  A summary of our findings can be found in the initial assessment below.  This includes our plan of care.  If you have any questions or concerns regarding these findings, please do not hesitate to contact me at the office phone number listed above.  Thank you for the referral.     Physician Signature:_______________________________Date:__________________  By signing above (or electronic signature), therapist’s plan is approved by physician       Physical Therapy: TREATMENT/PROGRESS NOTE   Patient: Boone Messina  (69 y.o. male)   Examination Date: 2024   :  1955 MRN: [id number]   Visit #:   Insurance Allowable Auth Needed   30 []Yes    [x]No    Insurance: Payor: [employer] PLAN / Plan: [employer] PLAN / Product Type: *No Product type* /   Insurance ID: [id number] - (Medicaid Managed)  Secondary Insurance (if applicable):    Treatment Diagnosis:     ICD-10-CM    1. Difficulty walking  R26.2       2. Decreased functional mobility  R26.89       3. Decreased functional activity tolerance  R68.89       4. Functional weakness  R53.1       5. Decreased ability to perform activities  R68.89       6. Need for home exercise program  Z78.9          Medical Diagnosis:  At high risk for falls [Z91.81]   Referring Physician: Woody Medeiros MD  PCP: Jeb Leyva MD     Plan of care signed (Y/N): routed    Date of Patient follow up with Physician:      Plan of Care Report: EVAL today  POC update due: (10 visits /OR AUTH LIMITS, whichever is less)  2024

## 2024-11-14 ENCOUNTER — APPOINTMENT (OUTPATIENT)
Dept: PHYSICAL THERAPY | Age: 69
End: 2024-11-14
Payer: COMMERCIAL

## 2024-11-21 ENCOUNTER — OFFICE VISIT (OUTPATIENT)
Dept: ENDOCRINOLOGY | Age: 69
End: 2024-11-21
Payer: COMMERCIAL

## 2024-11-21 VITALS
SYSTOLIC BLOOD PRESSURE: 128 MMHG | DIASTOLIC BLOOD PRESSURE: 68 MMHG | TEMPERATURE: 98 F | BODY MASS INDEX: 29.06 KG/M2 | WEIGHT: 203 LBS | HEIGHT: 70 IN | HEART RATE: 85 BPM | RESPIRATION RATE: 14 BRPM

## 2024-11-21 DIAGNOSIS — Z79.4 CONTROLLED TYPE 2 DIABETES MELLITUS WITH COMPLICATION, WITH LONG-TERM CURRENT USE OF INSULIN (HCC): Primary | ICD-10-CM

## 2024-11-21 DIAGNOSIS — E78.49 OTHER HYPERLIPIDEMIA: ICD-10-CM

## 2024-11-21 DIAGNOSIS — E11.8 CONTROLLED TYPE 2 DIABETES MELLITUS WITH COMPLICATION, WITH LONG-TERM CURRENT USE OF INSULIN (HCC): Primary | ICD-10-CM

## 2024-11-21 LAB — HBA1C MFR BLD: 7.5 %

## 2024-11-21 PROCEDURE — 3017F COLORECTAL CA SCREEN DOC REV: CPT | Performed by: INTERNAL MEDICINE

## 2024-11-21 PROCEDURE — G8484 FLU IMMUNIZE NO ADMIN: HCPCS | Performed by: INTERNAL MEDICINE

## 2024-11-21 PROCEDURE — 3074F SYST BP LT 130 MM HG: CPT | Performed by: INTERNAL MEDICINE

## 2024-11-21 PROCEDURE — 2022F DILAT RTA XM EVC RTNOPTHY: CPT | Performed by: INTERNAL MEDICINE

## 2024-11-21 PROCEDURE — 3078F DIAST BP <80 MM HG: CPT | Performed by: INTERNAL MEDICINE

## 2024-11-21 PROCEDURE — G8417 CALC BMI ABV UP PARAM F/U: HCPCS | Performed by: INTERNAL MEDICINE

## 2024-11-21 PROCEDURE — G8427 DOCREV CUR MEDS BY ELIG CLIN: HCPCS | Performed by: INTERNAL MEDICINE

## 2024-11-21 PROCEDURE — 99214 OFFICE O/P EST MOD 30 MIN: CPT | Performed by: INTERNAL MEDICINE

## 2024-11-21 PROCEDURE — 1123F ACP DISCUSS/DSCN MKR DOCD: CPT | Performed by: INTERNAL MEDICINE

## 2024-11-21 PROCEDURE — 3051F HG A1C>EQUAL 7.0%<8.0%: CPT | Performed by: INTERNAL MEDICINE

## 2024-11-21 PROCEDURE — 1036F TOBACCO NON-USER: CPT | Performed by: INTERNAL MEDICINE

## 2024-11-21 PROCEDURE — 83036 HEMOGLOBIN GLYCOSYLATED A1C: CPT | Performed by: INTERNAL MEDICINE

## 2024-11-21 RX ORDER — CALCIUM CITRATE/VITAMIN D3 200MG-6.25
TABLET ORAL
Qty: 450 STRIP | Refills: 5 | Status: SHIPPED | OUTPATIENT
Start: 2024-11-21

## 2024-11-21 RX ORDER — GLUCOSAM/CHON-MSM1/C/MANG/BOSW 500-416.6
TABLET ORAL
Qty: 450 EACH | Refills: 5 | Status: SHIPPED | OUTPATIENT
Start: 2024-11-21

## 2024-11-21 NOTE — PROGRESS NOTES
concerned of side effects  He wanted to avoid jardiance as concerned about side effects.  Goal < 7.5%  He wants to avoid medication. Discussed GLP-1 agonist. Advised may add basal insulin if needed  Glucose intermittently high  Discussed insulin , SSI as needed.Glucose stable but A1c is higher. Check lab A1c and fructosamine next visit  Discussed CGM, he wants to avoid  2.HTN: BP high, off medication.Repeat better  3.HLD: LDL at goal but statin intolerance, muscle aches. PCSK-9 inhibitor not covered per patient. He saw cardiology  Takes Vit D 1000 IU , vit D 38, switched to D2, did not help  Can go back to D3  He is back on lipitor 20mg  LDL at goal  4.CKDIII: saw Nephrology but not following now.Recommend to see  5.Gout  6.Panic Disorder  7.ED: Libido is good, nl testosterone, as he would like evaluated but concerned about side effects, discussed it may not help ED much, advised see urology      Plan:       humalog SSI 2/34 starting at 150 .Insulin education. Discussed scale at visit   Advise to check blood sugar 1 times a day but he would like to monitor more frequently as helped controlled- recommend TID                                                               Patient to send blood sugar log for titration.   Advise to exercise regularly. Advise to low simple carbohydrate and protein with each  meal diet. 40gm of CHO meal per day   Diabetes Care: recommend yearly eye exam, foot exam and urine microalbumin to   creatinine ratio.     -Hyperlipidemia, LDL goal is <100 mg/dl lipitor 20mg  -Hypertension: Continue same  -Daily ASA: 81mg  -Smoking status: non smoker

## 2024-12-10 ENCOUNTER — TELEPHONE (OUTPATIENT)
Dept: FAMILY MEDICINE CLINIC | Age: 69
End: 2024-12-10

## 2024-12-10 NOTE — TELEPHONE ENCOUNTER
Pt. C/o having a dental abscess. He does not have a dentist and asking if you can RX a zpak [facility]? Offered patient an appt but declined.

## 2024-12-10 NOTE — TELEPHONE ENCOUNTER
MA contacted pt and relayed info regarding advice. No further action needed on our part.    - Thrombosis + thrombphlebitis of IMV. 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 Either uterine or colonic. Pt has not had a colonoscopy.  - TVUS with 17mm stripe. GYN wanted to do EMB but patient refused.  - Will need colonoscopy outpatient. Can do hypercoagulable workup if those negative.  - IR with no safe percutaneous window to take a biopsy  - Continue lovenox for now.  - $0 copay, patient will administer lovenox.  - EMB on 1/23, without sufficient sampling.

## 2024-12-16 ENCOUNTER — TELEPHONE (OUTPATIENT)
Dept: ADMINISTRATIVE | Age: 69
End: 2024-12-16

## 2024-12-16 DIAGNOSIS — M1A.09X0 IDIOPATHIC CHRONIC GOUT OF MULTIPLE SITES WITHOUT TOPHUS: ICD-10-CM

## 2024-12-16 RX ORDER — FEBUXOSTAT 40 MG/1
TABLET ORAL
Qty: 90 TABLET | Refills: 3 | Status: SHIPPED | OUTPATIENT
Start: 2024-12-16 | End: 2024-12-19 | Stop reason: SDUPTHER

## 2024-12-16 NOTE — TELEPHONE ENCOUNTER
Submitted PA for Uloric 40MG tablets (Brand)  Via CM Key: [id number]  STATUS: PENDING.    Follow up done daily; if no decision with in three days we will refax.  If another three days goes by with no decision will call the insurance for status.

## 2024-12-16 NOTE — TELEPHONE ENCOUNTER
Medication:   Requested Prescriptions     Pending Prescriptions Disp Refills    ULORIC 40 MG TABS tablet 90 tablet 3     Sig: TAKE ONE TABLET BY MOUTH DAILY     Last Filled:      Last appt: 9/19/2024   Next appt: 12/19/2024    Last OARRS:       9/5/2024     2:40 PM   RX Monitoring   Periodic Controlled Substance Monitoring Possible medication side effects, risk of tolerance/dependence & alternative treatments discussed.;No signs of potential drug abuse or diversion identified.;Assessed functional status (ability to engage in work or other purposeful activities, the pain intensity and its interference with activities of daily living, quality of family life and social activities, and the physical activity)   Chronic Pain > 50 MEDD Obtained or confirmed \"Consent for Opioid Use\" on file.

## 2024-12-18 NOTE — TELEPHONE ENCOUNTER
Status of PA for Uloric 40MG tablets on CMM states that Provider Cancelled the request.  I called [facility] and spoke to Kadi KO.  Kadi said that the PA for Uloric 40MG tablets (Brand) was APPROVED 12/16/24-12/15/25.  Phone call reference number: [id number]    If this requires a response please respond to the pool ( P MHCX PSC MEDICATION PRE-AUTH).      Thank you please advise patient.

## 2024-12-19 ENCOUNTER — OFFICE VISIT (OUTPATIENT)
Dept: FAMILY MEDICINE CLINIC | Age: 69
End: 2024-12-19

## 2024-12-19 VITALS
TEMPERATURE: 97.8 F | DIASTOLIC BLOOD PRESSURE: 72 MMHG | HEART RATE: 78 BPM | SYSTOLIC BLOOD PRESSURE: 130 MMHG | BODY MASS INDEX: 27.72 KG/M2 | WEIGHT: 198 LBS | HEIGHT: 71 IN | OXYGEN SATURATION: 98 % | RESPIRATION RATE: 12 BRPM

## 2024-12-19 DIAGNOSIS — G89.29 CHRONIC BILATERAL LOW BACK PAIN WITH BILATERAL SCIATICA: Primary | ICD-10-CM

## 2024-12-19 DIAGNOSIS — F41.9 ANXIETY: ICD-10-CM

## 2024-12-19 DIAGNOSIS — M1A.09X0 IDIOPATHIC CHRONIC GOUT OF MULTIPLE SITES WITHOUT TOPHUS: ICD-10-CM

## 2024-12-19 DIAGNOSIS — M54.42 CHRONIC BILATERAL LOW BACK PAIN WITH BILATERAL SCIATICA: Primary | ICD-10-CM

## 2024-12-19 DIAGNOSIS — M54.41 CHRONIC BILATERAL LOW BACK PAIN WITH BILATERAL SCIATICA: Primary | ICD-10-CM

## 2024-12-19 RX ORDER — ALPRAZOLAM 1 MG/1
TABLET ORAL
Qty: 120 TABLET | Refills: 2 | Status: SHIPPED | OUTPATIENT
Start: 2024-12-19 | End: 2025-03-21

## 2024-12-19 RX ORDER — FEBUXOSTAT 40 MG/1
TABLET ORAL
Qty: 90 TABLET | Refills: 3 | Status: SHIPPED | OUTPATIENT
Start: 2024-12-19

## 2024-12-19 ASSESSMENT — ENCOUNTER SYMPTOMS
COLOR CHANGE: 0
BACK PAIN: 0
SHORTNESS OF BREATH: 0
SINUS PAIN: 0
SINUS PRESSURE: 0
ABDOMINAL PAIN: 0
COUGH: 0
WHEEZING: 0
CONSTIPATION: 0
DIARRHEA: 0

## 2024-12-19 NOTE — ASSESSMENT & PLAN NOTE
Monitored by specialist- no acute findings meriting change in the plan. Seeing [facility] after the first of the year.

## 2024-12-19 NOTE — ASSESSMENT & PLAN NOTE
Chronic, at goal (stable), Continue Uloric     Orders:    ULORIC 40 MG TABS tablet; TAKE ONE TABLET BY MOUTH DAILY

## 2024-12-19 NOTE — ASSESSMENT & PLAN NOTE
Chronic, at goal (stable), continue Xanax at current dose  PDMP Monitoring:    Last PDMP Chaitanya as Reviewed:  Review User Review Instant Review Result   LISET JOINER 12/19/2024  2:58 PM Reviewed PDMP [1]     Last Controlled Substance Monitoring Documentation      Flowsheet Row Office Visit from 9/5/2024 in [facility]   Periodic Controlled Substance Monitoring Possible medication side effects, risk of tolerance/dependence & alternative treatments discussed., No signs of potential drug abuse or diversion identified., Assessed functional status (ability to engage in work or other purposeful activities, the pain intensity and its interference with activities of daily living, quality of family life and social activities, and the physical activity) filed at 09/05/2024 1440   All MEDD Reviewed of previous treatment and response to treatment, patients adherence to medication and non-medication treatment, Treatment Plan documented (Diagnosis, Goals, Rationale for the medication choice & dosage and Planned duration of treatment and steps for further assessment and follow-up, Discussed with pt. or guardian: Benefits and risks of the medication, including potential for addiction & risk of overdose and responsibility to safely store & appropriately dispose of the medication, Benefits and risks of the medication, including potential for addiction & risk of overdose and responsibility to safely store & appropriately dispose of the medication filed at 09/05/2024 1440   Chronic Pain > 50 MEDD Obtained or confirmed \"Consent for Opioid Use\" on file. filed at 09/05/2024 1440          Urine Drug Screenings (1 yr)    No resulted procedures found.       Medication Contract and Consent for Opioid Use Documents Filed        No documents found                      Orders:    ALPRAZolam (XANAX) 1 MG tablet; Take 1 tablet 3 times a day and as needed not to exceed 4 mg per day

## 2024-12-19 NOTE — PROGRESS NOTES
Boone MUIR Mayuri Maldonado (:  1955) is a 69 y.o. male,Established patient, here for evaluation of the following chief complaint(s):  3 Month Follow-Up     Assessment & Plan  Anxiety   Chronic, at goal (stable), continue Xanax at current dose  PDMP Monitoring:    Last PDMP Chaitanya as Reviewed:  Review User Review Instant Review Result   LISET JOINER 2024  2:58 PM Reviewed PDMP [1]     Last Controlled Substance Monitoring Documentation      Flowsheet Row Office Visit from 2024 in [facility]   Periodic Controlled Substance Monitoring Possible medication side effects, risk of tolerance/dependence & alternative treatments discussed., No signs of potential drug abuse or diversion identified., Assessed functional status (ability to engage in work or other purposeful activities, the pain intensity and its interference with activities of daily living, quality of family life and social activities, and the physical activity) filed at 2024 1440   All MEDD Reviewed of previous treatment and response to treatment, patients adherence to medication and non-medication treatment, Treatment Plan documented (Diagnosis, Goals, Rationale for the medication choice & dosage and Planned duration of treatment and steps for further assessment and follow-up, Discussed with pt. or guardian: Benefits and risks of the medication, including potential for addiction & risk of overdose and responsibility to safely store & appropriately dispose of the medication, Benefits and risks of the medication, including potential for addiction & risk of overdose and responsibility to safely store & appropriately dispose of the medication filed at 2024 1440   Chronic Pain > 50 MEDD Obtained or confirmed \"Consent for Opioid Use\" on file. filed at 2024 1440          Urine Drug Screenings (1 yr)    No resulted procedures found.       Medication Contract and Consent for Opioid Use Documents Filed

## 2025-01-20 ENCOUNTER — TELEPHONE (OUTPATIENT)
Dept: FAMILY MEDICINE CLINIC | Age: 70
End: 2025-01-20

## 2025-01-20 NOTE — TELEPHONE ENCOUNTER
Pt was told he should see a neurologist for his chronic lower back pain and difficulty walking, pt has already seen Ortho and PT, neither improved his symptoms. Asking for imaging to be ordered on his spine that he can get that done prior to seeing neurology, and then get a referral for Brett.

## 2025-01-23 ENCOUNTER — TELEPHONE (OUTPATIENT)
Dept: FAMILY MEDICINE CLINIC | Age: 70
End: 2025-01-23

## 2025-01-23 NOTE — TELEPHONE ENCOUNTER
Pt requesting imaging to be done a [facility] to visualize his heart, wants to see if there is any blockages or blood clots, ect. Said he has a panic disorder that can imitate heart issues and he wants to be sure prior to going under for an endoscopy.

## 2025-01-23 NOTE — TELEPHONE ENCOUNTER
Wants Dr. Leyva to merge the orders for his heart and both his legs to [facility], if possible. Patient was told [facility] can do both of them and he wants Dr. Leyva's approval to do so.     [phone] (home) [phone] (work)

## 2025-01-27 ENCOUNTER — TELEPHONE (OUTPATIENT)
Dept: FAMILY MEDICINE CLINIC | Age: 70
End: 2025-01-27

## 2025-01-27 DIAGNOSIS — M54.41 CHRONIC BILATERAL LOW BACK PAIN WITH BILATERAL SCIATICA: ICD-10-CM

## 2025-01-27 DIAGNOSIS — M54.42 CHRONIC BILATERAL LOW BACK PAIN WITH BILATERAL SCIATICA: ICD-10-CM

## 2025-01-27 DIAGNOSIS — G62.9 NEUROPATHY: Primary | ICD-10-CM

## 2025-01-27 DIAGNOSIS — G89.29 CHRONIC BILATERAL LOW BACK PAIN WITH BILATERAL SCIATICA: ICD-10-CM

## 2025-01-28 NOTE — TELEPHONE ENCOUNTER
Imaging orders at the request of [facility] per patient.  Patient is to follow-up with [facility] pending results.

## 2025-02-06 ENCOUNTER — TELEPHONE (OUTPATIENT)
Dept: FAMILY MEDICINE CLINIC | Age: 70
End: 2025-02-06

## 2025-02-06 NOTE — TELEPHONE ENCOUNTER
[phone] (home) [phone] (work)    Wants to speak to REJI Farnsworth about upcoming / possible EMG procedure ordered on 1/27/2025.     Had questions about how the procedure is done / what's expected.

## 2025-02-10 NOTE — TELEPHONE ENCOUNTER
Spoke with patient regarding questions that he had on an EMG procedure. He asked to speak with Daja because he was told that she had this procedure before and he wanted to know what they do during the EMG because he heard things that are making him nervous to have it done.

## 2025-02-11 ENCOUNTER — HOSPITAL ENCOUNTER (OUTPATIENT)
Dept: MRI IMAGING | Age: 70
Discharge: HOME | End: 2025-02-11
Payer: COMMERCIAL

## 2025-02-11 DIAGNOSIS — G62.9 NEUROPATHY: ICD-10-CM

## 2025-02-11 DIAGNOSIS — G89.29 CHRONIC BILATERAL LOW BACK PAIN WITH BILATERAL SCIATICA: ICD-10-CM

## 2025-02-11 DIAGNOSIS — M54.42 CHRONIC BILATERAL LOW BACK PAIN WITH BILATERAL SCIATICA: ICD-10-CM

## 2025-02-11 DIAGNOSIS — M54.41 CHRONIC BILATERAL LOW BACK PAIN WITH BILATERAL SCIATICA: ICD-10-CM

## 2025-02-11 PROCEDURE — 72148 MRI LUMBAR SPINE W/O DYE: CPT

## 2025-03-13 ENCOUNTER — HOSPITAL ENCOUNTER (OUTPATIENT)
Age: 70
Discharge: HOME | End: 2025-03-13
Payer: COMMERCIAL

## 2025-03-13 ENCOUNTER — HOSPITAL ENCOUNTER (OUTPATIENT)
Dept: GENERAL RADIOLOGY | Age: 70
Discharge: HOME | End: 2025-03-13
Attending: NEUROLOGICAL SURGERY
Payer: COMMERCIAL

## 2025-03-13 PROCEDURE — 72110 X-RAY EXAM L-2 SPINE 4/>VWS: CPT

## 2025-03-18 SDOH — ECONOMIC STABILITY: FOOD INSECURITY: WITHIN THE PAST 12 MONTHS, THE FOOD YOU BOUGHT JUST DIDN'T LAST AND YOU DIDN'T HAVE MONEY TO GET MORE.: NEVER TRUE

## 2025-03-18 SDOH — ECONOMIC STABILITY: INCOME INSECURITY: IN THE LAST 12 MONTHS, WAS THERE A TIME WHEN YOU WERE NOT ABLE TO PAY THE MORTGAGE OR RENT ON TIME?: NO

## 2025-03-18 SDOH — ECONOMIC STABILITY: TRANSPORTATION INSECURITY
IN THE PAST 12 MONTHS, HAS LACK OF TRANSPORTATION KEPT YOU FROM MEETINGS, WORK, OR FROM GETTING THINGS NEEDED FOR DAILY LIVING?: NO

## 2025-03-18 SDOH — ECONOMIC STABILITY: FOOD INSECURITY: WITHIN THE PAST 12 MONTHS, YOU WORRIED THAT YOUR FOOD WOULD RUN OUT BEFORE YOU GOT MONEY TO BUY MORE.: NEVER TRUE

## 2025-03-18 SDOH — ECONOMIC STABILITY: TRANSPORTATION INSECURITY
IN THE PAST 12 MONTHS, HAS THE LACK OF TRANSPORTATION KEPT YOU FROM MEDICAL APPOINTMENTS OR FROM GETTING MEDICATIONS?: NO

## 2025-03-18 ASSESSMENT — PATIENT HEALTH QUESTIONNAIRE - PHQ9
SUM OF ALL RESPONSES TO PHQ QUESTIONS 1-9: 0
SUM OF ALL RESPONSES TO PHQ QUESTIONS 1-9: 0
1. LITTLE INTEREST OR PLEASURE IN DOING THINGS: NOT AT ALL
2. FEELING DOWN, DEPRESSED OR HOPELESS: NOT AT ALL
SUM OF ALL RESPONSES TO PHQ QUESTIONS 1-9: 0
SUM OF ALL RESPONSES TO PHQ QUESTIONS 1-9: 0
1. LITTLE INTEREST OR PLEASURE IN DOING THINGS: NOT AT ALL
SUM OF ALL RESPONSES TO PHQ9 QUESTIONS 1 & 2: 0
2. FEELING DOWN, DEPRESSED OR HOPELESS: NOT AT ALL

## 2025-03-20 ENCOUNTER — OFFICE VISIT (OUTPATIENT)
Dept: FAMILY MEDICINE CLINIC | Age: 70
End: 2025-03-20
Payer: COMMERCIAL

## 2025-03-20 VITALS
OXYGEN SATURATION: 98 % | BODY MASS INDEX: 28.72 KG/M2 | SYSTOLIC BLOOD PRESSURE: 158 MMHG | WEIGHT: 203 LBS | HEART RATE: 63 BPM | DIASTOLIC BLOOD PRESSURE: 60 MMHG

## 2025-03-20 DIAGNOSIS — F41.9 ANXIETY: ICD-10-CM

## 2025-03-20 DIAGNOSIS — G62.9 NEUROPATHY: Primary | ICD-10-CM

## 2025-03-20 DIAGNOSIS — E11.22 TYPE 2 DIABETES MELLITUS WITH CHRONIC KIDNEY DISEASE, WITHOUT LONG-TERM CURRENT USE OF INSULIN, UNSPECIFIED CKD STAGE (HCC): ICD-10-CM

## 2025-03-20 PROCEDURE — 3017F COLORECTAL CA SCREEN DOC REV: CPT | Performed by: NURSE PRACTITIONER

## 2025-03-20 PROCEDURE — 99214 OFFICE O/P EST MOD 30 MIN: CPT | Performed by: NURSE PRACTITIONER

## 2025-03-20 PROCEDURE — 3077F SYST BP >= 140 MM HG: CPT | Performed by: NURSE PRACTITIONER

## 2025-03-20 PROCEDURE — 3046F HEMOGLOBIN A1C LEVEL >9.0%: CPT | Performed by: NURSE PRACTITIONER

## 2025-03-20 PROCEDURE — G2211 COMPLEX E/M VISIT ADD ON: HCPCS | Performed by: NURSE PRACTITIONER

## 2025-03-20 PROCEDURE — G8428 CUR MEDS NOT DOCUMENT: HCPCS | Performed by: NURSE PRACTITIONER

## 2025-03-20 PROCEDURE — 1123F ACP DISCUSS/DSCN MKR DOCD: CPT | Performed by: NURSE PRACTITIONER

## 2025-03-20 PROCEDURE — 1036F TOBACCO NON-USER: CPT | Performed by: NURSE PRACTITIONER

## 2025-03-20 PROCEDURE — 2022F DILAT RTA XM EVC RTNOPTHY: CPT | Performed by: NURSE PRACTITIONER

## 2025-03-20 PROCEDURE — G8417 CALC BMI ABV UP PARAM F/U: HCPCS | Performed by: NURSE PRACTITIONER

## 2025-03-20 PROCEDURE — 3078F DIAST BP <80 MM HG: CPT | Performed by: NURSE PRACTITIONER

## 2025-03-20 RX ORDER — ALPRAZOLAM 1 MG/1
TABLET ORAL
Qty: 100 TABLET | Refills: 2 | Status: SHIPPED | OUTPATIENT
Start: 2025-03-20 | End: 2025-06-20

## 2025-03-20 NOTE — ASSESSMENT & PLAN NOTE
Chronic, at goal (stable), continue current treatment plan  Lab Results   Component Value Date    LABA1C 7.5 11/21/2024    LABA1C 7.3 08/06/2024    LABA1C 7.0 04/25/2024    LABA1C 7.3 12/07/2023    LABA1C 7.3 08/03/2023   Doesn't want to take insulin or Metformin  Sees [facility] for this

## 2025-03-20 NOTE — PROGRESS NOTES
Boone Messina Quinn (:  1955) is a 69 y.o. male,Established patient, here for evaluation of the following chief complaint(s):  No chief complaint on file.      ASSESSMENT/PLAN:  Assessment & Plan        1. Anxiety  The following orders have not been finalized:  -     ALPRAZolam (XANAX) 1 MG tablet  2. Type 2 diabetes mellitus with chronic kidney disease, without long-term current use of insulin, unspecified CKD stage (HCC)    No follow-ups on file.    SUBJECTIVE/OBJECTIVE:    History of Present Illness        Current Outpatient Medications   Medication Sig Dispense Refill    ALPRAZolam (XANAX) 1 MG tablet Take 1 tablet 3 times a day and as needed not to exceed 4 mg per day 120 tablet 2    ULORIC 40 MG TABS tablet TAKE ONE TABLET BY MOUTH DAILY 90 tablet 3    blood glucose test strips (TRUE METRIX BLOOD GLUCOSE TEST) strip USE TO TEST 3-4 TIMES DAILY AS NEEDED 450 strip 5    TRUEplus Lancets 33G MISC USE three to  FOUR TIMES A  each 5    atorvastatin (LIPITOR) 20 MG tablet Take 1 tablet by mouth daily 90 tablet 3    insulin lispro, 1 Unit Dial, (HUMALOG KWIKPEN) 100 UNIT/ML SOPN 1-4 units AC TID 1 Adjustable Dose Pre-filled Pen Syringe 3    Insulin Pen Needle (B-D UF III MINI PEN NEEDLES) 31G X 5 MM MISC 1 each by Does not apply route daily 100 each 6    Blood Pressure KIT 1 Device by Does not apply route daily 1 kit 0    vitamin D3 (CHOLECALCIFEROL) 10 MCG (400 UNIT) TABS tablet Take 1 tablet by mouth daily      Ascorbic Acid (VITAMIN C) 250 MG tablet Take 1 tablet by mouth daily      Biotin 1000 MCG TABS Take 1,000 mcg by mouth daily      triamcinolone (KENALOG) 0.1 % ointment Apply topically 2 times daily 80 g 0    aspirin 325 MG EC tablet Take 1 tablet by mouth daily       No current facility-administered medications for this visit.     MEDICATION LIST REVIEWED AND UPDATED AT TIME OF VISIT       Review of Systems    There were no vitals filed for this visit.    Physical Exam  Constitutional: Alert

## 2025-03-20 NOTE — ASSESSMENT & PLAN NOTE
Chronic, at goal (stable), continue current treatment plan    Orders:    ALPRAZolam (XANAX) 1 MG tablet; Take 1 tablet 3 times a day and as needed not to exceed 4 mg per day

## 2025-03-20 NOTE — PROGRESS NOTES
Boone Messina . (:  1955) is a 69 y.o. male,Established patient, here for evaluation of the following chief complaint(s):  3 Month Follow-Up (3 month f/u)         Assessment & Plan  Anxiety   Chronic, at goal (stable), continue current treatment plan    Orders:    ALPRAZolam (XANAX) 1 MG tablet; Take 1 tablet 3 times a day and as needed not to exceed 4 mg per day    Type 2 diabetes mellitus with chronic kidney disease, without long-term current use of insulin, unspecified CKD stage (HCC)   Chronic, at goal (stable), continue current treatment plan  Lab Results   Component Value Date    LABA1C 7.5 2024    LABA1C 7.3 2024    LABA1C 7.0 2024    LABA1C 7.3 2023    LABA1C 7.3 2023   Doesn't want to take insulin or Metformin  Se[facility] Caruso for this         Neuropathy   Chronic, not at goal (unstable), continue current treatment plan  Seeing [facility] for this         No follow-ups on file.       Subjective   HPI  Allergies   Allergen Reactions    Cephalexin     Crestor [Rosuvastatin] Other (See Comments)     myalgias    Levofloxacin     Penicillins     Succinylcholine      Does not want. Pt son had reaction to Succs and almost , pt was told not to ever take Succs.    Sulfa Antibiotics     Tetracyclines & Related     Verapamil     Vicodin [Hydrocodone-Acetaminophen]      Rash; ithching       Current Outpatient Medications   Medication Sig Dispense Refill    ALPRAZolam (XANAX) 1 MG tablet Take 1 tablet 3 times a day and as needed not to exceed 4 mg per day 100 tablet 2    ULORIC 40 MG TABS tablet TAKE ONE TABLET BY MOUTH DAILY 90 tablet 3    blood glucose test strips (TRUE METRIX BLOOD GLUCOSE TEST) strip USE TO TEST 3-4 TIMES DAILY AS NEEDED 450 strip 5    TRUEplus Lancets 33G MISC USE three to  FOUR TIMES A  each 5    atorvastatin (LIPITOR) 20 MG tablet Take 1 tablet by mouth daily 90 tablet 3    insulin lispro, 1 Unit Dial, (HUMALOG KWIKPEN) 100 UNIT/ML SOPN 1-4 units

## 2025-04-07 ENCOUNTER — TRANSCRIBE ORDERS (OUTPATIENT)
Dept: ADMINISTRATIVE | Age: 70
End: 2025-04-07

## 2025-04-07 DIAGNOSIS — R29.898 WEAKNESS OF LOWER EXTREMITY, UNSPECIFIED LATERALITY: Primary | ICD-10-CM

## 2025-04-18 ENCOUNTER — HOSPITAL ENCOUNTER (OUTPATIENT)
Dept: MRI IMAGING | Age: 70
Discharge: HOME | End: 2025-04-18
Attending: NEUROLOGICAL SURGERY
Payer: COMMERCIAL

## 2025-04-18 DIAGNOSIS — R29.898 WEAKNESS OF LOWER EXTREMITY, UNSPECIFIED LATERALITY: ICD-10-CM

## 2025-04-18 PROCEDURE — 72141 MRI NECK SPINE W/O DYE: CPT

## 2025-04-18 PROCEDURE — 72146 MRI CHEST SPINE W/O DYE: CPT

## 2025-05-08 DIAGNOSIS — Z79.4 CONTROLLED TYPE 2 DIABETES MELLITUS WITH COMPLICATION, WITH LONG-TERM CURRENT USE OF INSULIN (HCC): ICD-10-CM

## 2025-05-08 DIAGNOSIS — E11.8 CONTROLLED TYPE 2 DIABETES MELLITUS WITH COMPLICATION, WITH LONG-TERM CURRENT USE OF INSULIN (HCC): ICD-10-CM

## 2025-05-09 LAB
EST. AVERAGE GLUCOSE BLD GHB EST-MCNC: 188.6 MG/DL
HBA1C MFR BLD: 8.2 %

## 2025-05-10 LAB — FRUCTOSAMINE SERPL-SCNC: 411 UMOL/L (ref 205–285)

## 2025-05-22 ENCOUNTER — OFFICE VISIT (OUTPATIENT)
Dept: ENDOCRINOLOGY | Age: 70
End: 2025-05-22

## 2025-05-22 VITALS
BODY MASS INDEX: 28.28 KG/M2 | DIASTOLIC BLOOD PRESSURE: 64 MMHG | TEMPERATURE: 98 F | SYSTOLIC BLOOD PRESSURE: 132 MMHG | HEART RATE: 80 BPM | WEIGHT: 202 LBS | HEIGHT: 71 IN | RESPIRATION RATE: 14 BRPM

## 2025-05-22 DIAGNOSIS — E11.8 CONTROLLED TYPE 2 DIABETES MELLITUS WITH COMPLICATION, WITH LONG-TERM CURRENT USE OF INSULIN (HCC): ICD-10-CM

## 2025-05-22 DIAGNOSIS — Z79.4 CONTROLLED TYPE 2 DIABETES MELLITUS WITH COMPLICATION, WITH LONG-TERM CURRENT USE OF INSULIN (HCC): ICD-10-CM

## 2025-05-22 DIAGNOSIS — E78.49 OTHER HYPERLIPIDEMIA: ICD-10-CM

## 2025-05-22 RX ORDER — CALCIUM CITRATE/VITAMIN D3 200MG-6.25
TABLET ORAL
Qty: 450 STRIP | Refills: 5 | Status: SHIPPED | OUTPATIENT
Start: 2025-05-22

## 2025-05-22 RX ORDER — INSULIN GLARGINE 100 [IU]/ML
INJECTION, SOLUTION SUBCUTANEOUS
Qty: 5 ADJUSTABLE DOSE PRE-FILLED PEN SYRINGE | Refills: 3 | Status: SHIPPED | OUTPATIENT
Start: 2025-05-22

## 2025-05-22 RX ORDER — GLUCOSAM/CHON-MSM1/C/MANG/BOSW 500-416.6
TABLET ORAL
Qty: 450 EACH | Refills: 5 | Status: SHIPPED | OUTPATIENT
Start: 2025-05-22

## 2025-05-22 NOTE — PROGRESS NOTES
Seen as f/u patient for diabetes    Interim:    Not taking insulin  States diet not well  Reports neuropathy symptoms.  Heaviness feet, PCP ordered EMG    Wants to avoid glipizide due to lows in the past    He is on jardiance per PCP  He has Concern about taking it  States called company about jardiance    No issues  Weight gain    Reports issues with Lipitor, muscle aches    Diagnosed with Type 2 diabetes mellitus > 5 years  Known diabetic complications: Nephropathy( cause?)     Current diabetic medications   onglyza 5mg-  Off of it as BG improved  GLipizide - stopped as had hypoglycemia- BG 38    Mild, controlled    Off metformin, has CKD    Last A1c 8.2%<---- 7.5%<----7.3%<----- 7%<--- 7.3%<-----7.3%<-----7.4%<--- 7%<------6.4%<--- 6.8%<-----<-----6.6%<------6.4%<------6.9%<---- 6.8%<-----6.4% <------6.6%<----6.5%<-----5.9 on 3/17<------6.1 on 12/16<------- 6.2% on 8/16 <------- 6.8 on 5/16 <--- 10.1<----- 7.5 on 8/15<--- 9.4 <---- 7.9    Prior visit with dietician: No  Current diet: on average, 3 meals per day  Started to restrict CHO  Current exercise: Walking  Current monitoring regimen: home blood tests 3-4  times daily     Has brought blood glucose log/meter yes  Home blood sugar records:  106-182    Any episodes of hypoglycemia? no    No Hx of CAD , PVD, CVA    Hyperlipidemia:stable, tolerating Zocor 20mg   on 1/13    LDL 72 on 9/17   on 12/18  LDL 57 on 12/19  lipitor 40mg  LDL 55 on 9/20   on 1/21   Now on lipitor 20mg  LDL 63 on 8/23  LDL 64 on 6/24    H/o crestor use-reports side effects    Repatha was denied per patient    Last eye exam: 10/22  Last foot exam: 5/25  Last microalbumin to creatinine ratio: 8/24   Cr 1.9 on 3/16---> 1.5 on 8/16---> 1.4 on 10/16--> 1.2 on 6/18--> 1.3---> 1.7---> 1.9---> 1.7%    HTN: Stable, lisinopril 10mg- off it due to low BP    He has arthritis, he was inquiring about steroids and effect on DM    Past Medical History:   Diagnosis Date    Anxiety

## 2025-06-10 ENCOUNTER — TELEPHONE (OUTPATIENT)
Dept: ENDOCRINOLOGY | Age: 70
End: 2025-06-10

## 2025-06-10 RX ORDER — INSULIN GLARGINE 100 [IU]/ML
INJECTION, SOLUTION SUBCUTANEOUS
Qty: 5 ADJUSTABLE DOSE PRE-FILLED PEN SYRINGE | Refills: 3 | Status: SHIPPED | OUTPATIENT
Start: 2025-06-10

## 2025-06-10 NOTE — TELEPHONE ENCOUNTER
Patient states that the all day insulin ( insulin glargine (LANTUS SOLOSTAR) 100 UNIT/ML injection pen)  isn't working for him he asking for it to be increase to 5 he states that he doctor told him to call if he not working please advise [facility]

## 2025-06-16 NOTE — TELEPHONE ENCOUNTER
Assured patient that the Lantus that was sent in on 6/10/25 is the same Lantus that was sent in on 5/22 and he can take 5 units of either prescription.

## 2025-06-19 ENCOUNTER — OFFICE VISIT (OUTPATIENT)
Dept: FAMILY MEDICINE CLINIC | Age: 70
End: 2025-06-19

## 2025-06-19 VITALS
TEMPERATURE: 98 F | BODY MASS INDEX: 29.35 KG/M2 | SYSTOLIC BLOOD PRESSURE: 138 MMHG | WEIGHT: 205 LBS | OXYGEN SATURATION: 95 % | HEART RATE: 79 BPM | HEIGHT: 70 IN | DIASTOLIC BLOOD PRESSURE: 74 MMHG

## 2025-06-19 DIAGNOSIS — M54.42 CHRONIC BILATERAL LOW BACK PAIN WITH BILATERAL SCIATICA: ICD-10-CM

## 2025-06-19 DIAGNOSIS — N40.0 BPH WITHOUT OBSTRUCTION/LOWER URINARY TRACT SYMPTOMS: ICD-10-CM

## 2025-06-19 DIAGNOSIS — M1A.09X0 IDIOPATHIC CHRONIC GOUT OF MULTIPLE SITES WITHOUT TOPHUS: ICD-10-CM

## 2025-06-19 DIAGNOSIS — I10 ESSENTIAL HYPERTENSION: ICD-10-CM

## 2025-06-19 DIAGNOSIS — E78.2 MIXED HYPERLIPIDEMIA: ICD-10-CM

## 2025-06-19 DIAGNOSIS — M54.41 CHRONIC BILATERAL LOW BACK PAIN WITH BILATERAL SCIATICA: ICD-10-CM

## 2025-06-19 DIAGNOSIS — E11.22 TYPE 2 DIABETES MELLITUS WITH CHRONIC KIDNEY DISEASE, WITHOUT LONG-TERM CURRENT USE OF INSULIN, UNSPECIFIED CKD STAGE (HCC): Primary | ICD-10-CM

## 2025-06-19 DIAGNOSIS — G62.9 NEUROPATHY: ICD-10-CM

## 2025-06-19 DIAGNOSIS — G89.29 CHRONIC BILATERAL LOW BACK PAIN WITH BILATERAL SCIATICA: ICD-10-CM

## 2025-06-19 DIAGNOSIS — F41.9 ANXIETY: ICD-10-CM

## 2025-06-19 RX ORDER — ALPRAZOLAM 1 MG/1
TABLET ORAL
Qty: 100 TABLET | Refills: 2 | Status: SHIPPED | OUTPATIENT
Start: 2025-06-19 | End: 2025-09-19

## 2025-06-19 ASSESSMENT — ENCOUNTER SYMPTOMS
EYE REDNESS: 0
CHEST TIGHTNESS: 0
WHEEZING: 0
EYE ITCHING: 0
COUGH: 0
CONSTIPATION: 0
VOMITING: 0
TROUBLE SWALLOWING: 0
SHORTNESS OF BREATH: 0
BACK PAIN: 1
ABDOMINAL DISTENTION: 0
SINUS PRESSURE: 0
SINUS PAIN: 0
NAUSEA: 0
RHINORRHEA: 0
EYE PAIN: 0
EYE DISCHARGE: 0
STRIDOR: 0
DIARRHEA: 0
ABDOMINAL PAIN: 0

## 2025-06-19 NOTE — PROGRESS NOTES
nausea and vomiting.   Genitourinary:  Negative for difficulty urinating, dysuria, hematuria and urgency.   Musculoskeletal:  Positive for arthralgias, back pain and gait problem. Negative for joint swelling, myalgias and neck pain.   Skin:  Negative for rash and wound.   Neurological:  Negative for dizziness and headaches.       Vitals:    06/19/25 1503   BP: 138/74   BP Site: Left Upper Arm   Patient Position: Sitting   BP Cuff Size: Medium Adult   Pulse: 79   Temp: 98 °F (36.7 °C)   SpO2: 95%   Weight: 93 kg (205 lb)   Height: 1.778 m (5' 10\")       Physical Exam  Constitutional:       Appearance: Normal appearance. He is well-developed and normal weight.   HENT:      Head: Normocephalic.      Mouth/Throat:      Mouth: Mucous membranes are moist.   Eyes:      Pupils: Pupils are equal, round, and reactive to light.   Cardiovascular:      Rate and Rhythm: Normal rate and regular rhythm.   Pulmonary:      Effort: Pulmonary effort is normal.   Musculoskeletal:         General: Normal range of motion.      Cervical back: Normal range of motion.   Skin:     General: Skin is warm and dry.   Neurological:      Mental Status: He is alert and oriented to person, place, and time.         Physical Exam  Other: Blood pressure is 138/74.    The patient (or guardian, if applicable) and other individuals in attendance with the patient were advised that Artificial Intelligence will be utilized during this visit to record, process the conversation to generate a clinical note, and support improvement of the AI technology. The patient (or guardian, if applicable) and other individuals in attendance at the appointment consented to the use of AI, including the recording.          An electronic signature was used to authenticate this note.    --GAEL DAVIES, SHAI - CNP

## 2025-06-20 LAB
ALBUMIN SERPL-MCNC: 4.3 G/DL (ref 3.4–5)
ALBUMIN/GLOB SERPL: 1.7 {RATIO} (ref 1.1–2.2)
ALP SERPL-CCNC: 62 U/L (ref 40–129)
ALT SERPL-CCNC: 23 U/L (ref 10–40)
ANION GAP SERPL CALCULATED.3IONS-SCNC: 10 MMOL/L (ref 3–16)
AST SERPL-CCNC: 22 U/L (ref 15–37)
BASOPHILS # BLD: 0 K/UL (ref 0–0.2)
BASOPHILS NFR BLD: 0.4 %
BILIRUB SERPL-MCNC: 0.8 MG/DL (ref 0–1)
BUN SERPL-MCNC: 45 MG/DL (ref 7–20)
CALCIUM SERPL-MCNC: 9.4 MG/DL (ref 8.3–10.6)
CHLORIDE SERPL-SCNC: 107 MMOL/L (ref 99–110)
CHOLEST SERPL-MCNC: 130 MG/DL (ref 0–199)
CO2 SERPL-SCNC: 23 MMOL/L (ref 21–32)
CREAT SERPL-MCNC: 1.9 MG/DL (ref 0.8–1.3)
DEPRECATED RDW RBC AUTO: 12.9 % (ref 12.4–15.4)
EOSINOPHIL # BLD: 0.3 K/UL (ref 0–0.6)
EOSINOPHIL NFR BLD: 4.5 %
FOLATE SERPL-MCNC: 13 NG/ML (ref 4.78–24.2)
GFR SERPLBLD CREATININE-BSD FMLA CKD-EPI: 37 ML/MIN/{1.73_M2}
GLUCOSE SERPL-MCNC: 166 MG/DL (ref 70–99)
HCT VFR BLD AUTO: 38.5 % (ref 40.5–52.5)
HDLC SERPL-MCNC: 43 MG/DL (ref 40–60)
HGB BLD-MCNC: 13.2 G/DL (ref 13.5–17.5)
LDLC SERPL CALC-MCNC: 72 MG/DL
LYMPHOCYTES # BLD: 1.9 K/UL (ref 1–5.1)
LYMPHOCYTES NFR BLD: 27.3 %
MCH RBC QN AUTO: 31.8 PG (ref 26–34)
MCHC RBC AUTO-ENTMCNC: 34.2 G/DL (ref 31–36)
MCV RBC AUTO: 92.9 FL (ref 80–100)
MONOCYTES # BLD: 0.6 K/UL (ref 0–1.3)
MONOCYTES NFR BLD: 9 %
NEUTROPHILS # BLD: 4.2 K/UL (ref 1.7–7.7)
NEUTROPHILS NFR BLD: 58.8 %
PLATELET # BLD AUTO: 171 K/UL (ref 135–450)
PMV BLD AUTO: 9.1 FL (ref 5–10.5)
POTASSIUM SERPL-SCNC: 5 MMOL/L (ref 3.5–5.1)
PROT SERPL-MCNC: 6.8 G/DL (ref 6.4–8.2)
PSA SERPL DL<=0.01 NG/ML-MCNC: 1.35 NG/ML (ref 0–4)
RBC # BLD AUTO: 4.14 M/UL (ref 4.2–5.9)
SODIUM SERPL-SCNC: 140 MMOL/L (ref 136–145)
TRIGL SERPL-MCNC: 75 MG/DL (ref 0–150)
TSH SERPL DL<=0.005 MIU/L-ACNC: 1.69 UIU/ML (ref 0.27–4.2)
URATE SERPL-MCNC: 3 MG/DL (ref 3.5–7.2)
VIT B12 SERPL-MCNC: 281 PG/ML (ref 211–911)
VLDLC SERPL CALC-MCNC: 15 MG/DL
WBC # BLD AUTO: 7.1 K/UL (ref 4–11)

## 2025-06-23 LAB — VIT B6 SERPL-MCNC: 24.4 NMOL/L (ref 20–125)

## 2025-06-24 ENCOUNTER — RESULTS FOLLOW-UP (OUTPATIENT)
Dept: FAMILY MEDICINE CLINIC | Age: 70
End: 2025-06-24

## 2025-06-24 LAB — VIT B1 SERPL-MCNC: 20 NMOL/L (ref 4–15)

## 2025-06-26 ENCOUNTER — TELEPHONE (OUTPATIENT)
Dept: ENDOCRINOLOGY | Age: 70
End: 2025-06-26

## 2025-08-08 DIAGNOSIS — Z79.4 CONTROLLED TYPE 2 DIABETES MELLITUS WITH COMPLICATION, WITH LONG-TERM CURRENT USE OF INSULIN (HCC): Primary | ICD-10-CM

## 2025-08-08 DIAGNOSIS — E11.8 CONTROLLED TYPE 2 DIABETES MELLITUS WITH COMPLICATION, WITH LONG-TERM CURRENT USE OF INSULIN (HCC): Primary | ICD-10-CM

## 2025-08-08 RX ORDER — INSULIN GLARGINE 100 [IU]/ML
INJECTION, SOLUTION SUBCUTANEOUS
Qty: 5 ADJUSTABLE DOSE PRE-FILLED PEN SYRINGE | Refills: 3 | Status: SHIPPED | OUTPATIENT
Start: 2025-08-08

## 2025-08-08 RX ORDER — PEN NEEDLE, DIABETIC 31 GX3/16"
NEEDLE, DISPOSABLE MISCELLANEOUS
Qty: 100 EACH | Refills: 6 | Status: SHIPPED | OUTPATIENT
Start: 2025-08-08

## 2025-08-13 ENCOUNTER — TELEPHONE (OUTPATIENT)
Dept: PHARMACY | Age: 70
End: 2025-08-13

## 2025-09-04 ENCOUNTER — OFFICE VISIT (OUTPATIENT)
Dept: ENDOCRINOLOGY | Age: 70
End: 2025-09-04
Payer: COMMERCIAL

## 2025-09-04 VITALS
BODY MASS INDEX: 29.63 KG/M2 | DIASTOLIC BLOOD PRESSURE: 70 MMHG | HEART RATE: 86 BPM | SYSTOLIC BLOOD PRESSURE: 132 MMHG | HEIGHT: 70 IN | RESPIRATION RATE: 14 BRPM | TEMPERATURE: 98 F | WEIGHT: 207 LBS

## 2025-09-04 DIAGNOSIS — R79.89 ELEVATED SERUM CREATININE: ICD-10-CM

## 2025-09-04 DIAGNOSIS — E78.49 OTHER HYPERLIPIDEMIA: ICD-10-CM

## 2025-09-04 DIAGNOSIS — E11.8 CONTROLLED TYPE 2 DIABETES MELLITUS WITH COMPLICATION, WITH LONG-TERM CURRENT USE OF INSULIN (HCC): Primary | ICD-10-CM

## 2025-09-04 DIAGNOSIS — Z79.4 CONTROLLED TYPE 2 DIABETES MELLITUS WITH COMPLICATION, WITH LONG-TERM CURRENT USE OF INSULIN (HCC): Primary | ICD-10-CM

## 2025-09-04 LAB — HBA1C MFR BLD: 7.6 %

## 2025-09-04 PROCEDURE — 1123F ACP DISCUSS/DSCN MKR DOCD: CPT | Performed by: INTERNAL MEDICINE

## 2025-09-04 PROCEDURE — 3017F COLORECTAL CA SCREEN DOC REV: CPT | Performed by: INTERNAL MEDICINE

## 2025-09-04 PROCEDURE — 1036F TOBACCO NON-USER: CPT | Performed by: INTERNAL MEDICINE

## 2025-09-04 PROCEDURE — G2211 COMPLEX E/M VISIT ADD ON: HCPCS | Performed by: INTERNAL MEDICINE

## 2025-09-04 PROCEDURE — 2022F DILAT RTA XM EVC RTNOPTHY: CPT | Performed by: INTERNAL MEDICINE

## 2025-09-04 PROCEDURE — 3078F DIAST BP <80 MM HG: CPT | Performed by: INTERNAL MEDICINE

## 2025-09-04 PROCEDURE — 3051F HG A1C>EQUAL 7.0%<8.0%: CPT | Performed by: INTERNAL MEDICINE

## 2025-09-04 PROCEDURE — 3075F SYST BP GE 130 - 139MM HG: CPT | Performed by: INTERNAL MEDICINE

## 2025-09-04 PROCEDURE — 99214 OFFICE O/P EST MOD 30 MIN: CPT | Performed by: INTERNAL MEDICINE

## 2025-09-04 PROCEDURE — G8427 DOCREV CUR MEDS BY ELIG CLIN: HCPCS | Performed by: INTERNAL MEDICINE

## 2025-09-04 PROCEDURE — G8417 CALC BMI ABV UP PARAM F/U: HCPCS | Performed by: INTERNAL MEDICINE

## 2025-09-04 PROCEDURE — 83036 HEMOGLOBIN GLYCOSYLATED A1C: CPT | Performed by: INTERNAL MEDICINE

## 2025-09-04 RX ORDER — CALCIUM CITRATE/VITAMIN D3 200MG-6.25
TABLET ORAL
Qty: 450 STRIP | Refills: 5 | Status: SHIPPED | OUTPATIENT
Start: 2025-09-04

## 2025-09-04 RX ORDER — INSULIN GLARGINE 100 [IU]/ML
INJECTION, SOLUTION SUBCUTANEOUS
Qty: 2 ADJUSTABLE DOSE PRE-FILLED PEN SYRINGE | Refills: 5 | Status: SHIPPED | OUTPATIENT
Start: 2025-09-04

## (undated) DEVICE — FORCEPS BX L240CM JAW DIA2.8MM L CAP W/ NDL MIC MESH TOOTH